# Patient Record
Sex: FEMALE | Race: WHITE | Employment: OTHER | URBAN - METROPOLITAN AREA
[De-identification: names, ages, dates, MRNs, and addresses within clinical notes are randomized per-mention and may not be internally consistent; named-entity substitution may affect disease eponyms.]

---

## 2017-01-10 ENCOUNTER — APPOINTMENT (OUTPATIENT)
Dept: PHYSICAL THERAPY | Age: 72
End: 2017-01-10
Payer: MEDICARE

## 2017-01-11 ENCOUNTER — HOSPITAL ENCOUNTER (OUTPATIENT)
Dept: PHYSICAL THERAPY | Age: 72
Discharge: HOME | End: 2017-01-11
Payer: MEDICARE

## 2017-01-11 PROCEDURE — G8978 MOBILITY CURRENT STATUS: HCPCS

## 2017-01-11 PROCEDURE — 97162 PT EVAL MOD COMPLEX 30 MIN: CPT

## 2017-01-11 PROCEDURE — G8979 MOBILITY GOAL STATUS: HCPCS

## 2017-01-11 PROCEDURE — 97110 THERAPEUTIC EXERCISES: CPT

## 2017-01-11 NOTE — PROGRESS NOTES
[facility] [place] [employer] [facility]  [address] [place] [address]  [phone] fax    Plan of Care/ Statement of Necessity for Physical Therapy Services    Patient name: Darrel Palm Start of Care: 2017   Referral source: Lenan Bacon MD : 1945    Medical Diagnosis: Radiculopathy, thoracolumbar region [M54.15]   Onset Date:16    Treatment Diagnosis: Lumbar pain   Prior Hospitalization: see medical history Provider#: [id number]   Medications: Verified on Patient summary List    Comorbidities: HTN   Prior Level of Function: Ind with ADLs and household tasks, Lives with  in single family house with 3 steps to enter and 3 steps into den, Retired      [employer] of Care and following information is based on the information from the initial evaluation. Assessment/ key information:   Pt is a 66year old female with chief complaints of R lower lumbar and posterior hip pain starting 16 and noticed increase pain after sitting in metal folding chair for couple of hours. Pt's aggravating factors are lying supine, walking >1-2 minutes, standing >1-2 minutes, lying on stomach. Pt's easing factors are reclined in recliner chair, pain medication, muscle relaxers, and nerve pain medication. Pt reports her pain is along posterior R hip, lateral R hip,and and anterior R lower leg, but today only has pain was present along along anterior R leg. Pt reports previously having pain in R posterior hip but has migrated to anterior R leg. Pt currently presents with decreased R LE strength (grossly 3+ to 4-/5 throughout) and pain in R hip during lumbar extension, R rotation, and sidebending, but no pain during R extension quadrant testing. Pt did not present with decreased dural mobility or dermatomal alterations. Pt has minimal decreased in flexibility throughout B hamstrings and gastroc muscles.  Pt had difficulty with body awareness during TA contraction which required extensive verbal cueing to perform. Pt's functional abilities are limited by her pain in R posterior hip and leg. Pt's signs and symptoms are not consistent with radiculopathy but pt has moderate weakness in R LE compared to L with no change in dermatomal patterns. Pt has a fair prognosis of improving symptoms and will benefit from skilled physical therapy services to address deficits to return pt to more functional ability. Evaluation Complexity History MEDIUM  Complexity : 1-2 comorbidities / personal factors will impact the outcome/ POC ; Examination HIGH Complexity : 4+ Standardized tests and measures addressing body structure, function, activity limitation and / or participation in recreation  ;Presentation MEDIUM Complexity : Evolving with changing characteristics  ; Clinical Decision Making MEDIUM Complexity : FOTO score of 26-74  Overall Complexity Rating: MEDIUM  Problem List: pain affecting function, decrease ROM, decrease strength, impaired gait/ balance, decrease ADL/ functional abilitiies, decrease activity tolerance, decrease flexibility/ joint mobility and decrease transfer abilities   Treatment Plan may include any combination of the following: Therapeutic exercise, Therapeutic activities, Neuromuscular re-education, Physical agent/modality, Gait/balance training, Manual therapy, Patient education, Self Care training, Functional mobility training, Home safety training and Stair training  Patient / Family readiness to learn indicated by: asking questions, trying to perform skills and interest  Persons(s) to be included in education: patient (P)  Barriers to Learning/Limitations: None  Patient Goal (s): Reduce pain  Patient Self Reported Health Status: excellent  Rehabilitation Potential: fair    Short Term Goals: To be accomplished in 1 weeks:  1. Pt will be independent with HEP with appropriate technique and compliant of 1-2x per day to facilitate continued level of care.    Long Term Goals: To be accomplished in 6 weeks:  1. Pt will improve FOTO score by 11 points to demonstrate improved functional abilities. 2. Pt will report <3/10 pain during daily activities to improve QOL. 3. Pt will improve standing/walking tolerance to 15 minutes to increase ease with ADLs and household tasks. 4. Pt will improve R LE strength to at least 4/5 to increase ease with ADLs and household tasks. 5. Pt will improve R hip abduction strength to at least 3/5 to increase ease with ambulation and standing activities. Frequency / Duration: Patient to be seen 2-3 times per week for 5 weeks. Patient/ Caregiver education and instruction: Diagnosis, prognosis, self care, activity modification and exercises   [x]  Plan of care has been reviewed with PTA    G-Codes (GP)  Mobility   Current  CK= 40-59%   Goal  CJ= 20-39%    The severity rating is based on clinical judgment and the FOTO score. Certification Period: 1/11/2017 - 4/11/2017 (90 days)  Heike Berry 1/11/2017 6:34 PM    ________________________________________________________________________    I certify that the above Therapy Services are being furnished while the patient is under my care. I agree with the treatment plan and certify that this therapy is necessary.     Physician's Signature:____________________  Date:____________Time: _________    Please sign and return to [facility]  [address] [place] [address]  [phone] fax

## 2017-01-11 NOTE — PROGRESS NOTES
PT DAILY TREATMENT NOTE/LUMBAR EVAL 3-    Patient Name: Roxana Cavanaugh  Date:2017  : 1945  [x]  Patient  Verified  Payor: Moira Stephens / Plan: [place] MED[place] PART A & B / Product Type: Medicare /    In time: 340  Out time:415  Total Treatment Time (min): 35  Total Timed Codes (min): 10  1:1 Treatment Time ( [address] only): 35   Visit #: 1 of 10-15    Treatment Area: Radiculopathy, thoracolumbar region [M54.15]  SUBJECTIVE  Pain Level (0-10 scale): 2/10  []constant [x]intermittent []improving []worsening []no change since onset    Any medication changes, allergies to medications, adverse drug reactions, diagnosis change, or new procedure performed?: [x] No    [] Yes (see summary sheet for update)  Subjective functional status/changes:    Pt presents to therapy today with chief complaints of R posterior hip pain starting 16 without AGATA. Pt's pain goes posterior R hip, down lateral hip and thigh, and anterior lower leg. Pt denies any foot pain. Pt remembers sitting on metal folding chair back in December which is when the pain started. Pt is taking a nerve pill and pain medication which helps during the day but pain increases at night time. Pt's aggravating factors are lying supine (cats at ankles), walking >1-2 minutes, standing >1-2 minutes, lying on stomach  Pt's easing factors are sitting after walking/standing (sitting in recliner with feet up) and pain medication (not during the day, takes muscle relaxors during day). Pt reports prior to December, pt's cat would lay on anterior thigh which would make it numb. Pt would move her R leg around and numbness would subside. Pt lives with  in single floor house with 3 steps to enter and 3 steps into den. Pt denies having any problems with stairs.       PLOF: No AD for ambulation, Minimal exercising, Walking grocery store with cart (or mobile cart)  Limitations to PLOF: L knee pain (started up a while ago)  Mechanism of Injury: Sitting in metal folding chair  Current symptoms/Complaints: Dull, ache, deep down posterior R hip (lower back), lateral hip, anterior lower leg  Previous Treatment/Compliance: No therapy, just taking muscle relaxors, pain medication, and nerve pain medication   PMHx/Surgical Hx: No injury to back previously   Work Hx: Retired  Living Situation: Lives with  in single family house with 3 steps to enter and 3 steps into den  Pt Goals: Reduce pain  Motivation: Fair    OBJECTIVE/EXAMINATION  Domestic Life: Lives with  who has CVA, Single story house with 3 steps to enter and 3 step into den  Activity/Recreational Limitations: Decrease walking and standing ability  Mobility: Weakness in R LE, pain with R hip and lower leg, pain with EROM lumbar extension, R rotation, R side bend  Self Care: Unable to stand for long  25 min [x]Eval                  []Re-Eval       10 min Therapeutic Exercise:  [] See flow sheet :   Rationale: increase ROM and increase strength to improve the patients ability to perform functional activities in the home and community setting           With   [x] TE   [] TA   [] neuro   [] other: Patient Education: [x] Review HEP    [] Progressed/Changed HEP based on:   [] positioning   [] body mechanics   [] transfers   [] heat/ice application    [] other:      Physical Therapy Evaluation - Lumbar Spine ([facility])  Blood pressure: 132/80 mmHg    Posture:  Lateral Shift: [] R    [] L     [] +  [x] -  Lordosis:  [x] Increased [] Decreased   [] WNL    Gait:  [] Normal     [x] Abnormal: Wide ELIOT, Lateral trunk shift during stance phase    Active Movements: [] N/A   [] Too acute   [] Other:  ROM % AROM % PROM Comments:pain, area   Forward flexion 40-60 75     Extension 20-30 75  Pain in R posterior hip   SB right 20-30 75  Minimal pain in R posterior hip   SB left 20-30 75     Rotation right 5-10 75  Pain in R posterior hip   Rotation left 5-10 75       Neuro Screen [] WNL  Myotome/Dermatome/Reflexes: Abnormal  Comments: Myotomes weak and unable to hold contraction for more than 1 second, Dermatomes WNL and denies any sensory changes    Dural Mobility:  SLR Sitting: [] R    [] L    [] +    [] -  @ (degrees):           Supine: [x] R    [x] L    [] +    [x] -  @ (degrees):   Slump Test: [x] R    [x] L    [] +    [x] -  @ (degrees):     Palpation  No palpable tenderness to any structures in R hip, R SIJ region, R anterior lower leg, R anterior thigh, R ITB    Strength   L(0-5) R (0-5) N/T   Hip Flexion (L1,2) 4+ 3+ []   Knee Extension (L3,4) 4+ 4- []   Ankle Dorsiflexion (L4) 4+ 4- []   Great Toe Extension (L5)   []   Ankle Plantarflexion (S1)   []   Knee Flexion (S1,2) 4+ 4 []   Upper Abdominals   []   Lower Abdominals   []   Paraspinals   []   Back Rotators   []   Gluteus Matt   []   Other: Glute med/min 3+ 3- []   TFL compensations noted during glute med testing on both sides     Special Tests  Lumbar:  Lumb. Compression: [] Pos  [] Neg               Lumbar Distraction:   [] Pos  [] Neg    Quadrant:  [] Pos  [x] Neg   [] Flex  [x] Ext and R    Sacroilliac:  Sacral Thrust:   [] +    [x] -          Hip: Shelby Clayton:  [x] R    [] L    [] +    [x] -          Deficits: Hamstrings 90/90: WNL (pulling along posterior leg but did not replicate her pain)    Gastrocsoleus (to neutral): Right: WNL Left: WNL       Global Muscular Weakness:  Abdominals: Weak    Pain Level (0-10 scale) post treatment: 2/10    ASSESSMENT/Changes in Function:   Pt is a 66year old female with chief complaints of R lower lumbar and posterior hip pain starting 12/12/16 and noticed increase pain after sitting in metal folding chair for couple of hours. Pt's aggravating factors are lying supine, walking >1-2 minutes, standing >1-2 minutes, lying on stomach. Pt's easing factors are reclined in recliner chair, pain medication, muscle relaxers, and nerve pain medication.  Pt reports her pain is along posterior R hip, lateral R hip,and and anterior R lower leg, but today only has pain was present along along anterior R leg. Pt reports previously having pain in R posterior hip but has migrated to anterior R leg. Pt currently presents with decreased R LE strength (grossly 3+ to 4-/5 throughout) and pain in R hip during lumbar extension, R rotation, and sidebending, but no pain during R extension quadrant testing. Pt did not present with decreased dural mobility or dermatomal alterations. Pt has minimal decreased in flexibility throughout B hamstrings and gastroc muscles. Pt had difficulty with body awareness during TA contraction which required extensive verbal cueing to perform. Pt's functional abilities are limited by her pain in R posterior hip and leg. Pt's signs and symptoms are not consistent with radiculopathy but pt has moderate weakness in R LE compared to L with no change in dermatomal patterns. Pt has a fair prognosis of improving symptoms and will benefit from skilled physical therapy services to address deficits to return pt to more functional ability. Patient will continue to benefit from skilled PT services to modify and progress therapeutic interventions, address functional mobility deficits, address ROM deficits, address strength deficits, analyze and cue movement patterns, analyze and modify body mechanics/ergonomics, assess and modify postural abnormalities, address imbalance/dizziness and instruct in home and community integration to attain remaining goals. [x]  See Plan of Care  []  See progress note/recertification  []  See Discharge Summary         Progress towards goals / Updated goals:  Short term goals  1. Pt will be independent with HEP with appropriate technique and compliant of 1-2x per day to facilitate continued level of care. Long term goals  1. Pt will improve FOTO score by 11 points to demonstrate improved functional abilities. 2. Pt will report <3/10 pain during daily activities to improve QOL.   3. Pt will improve standing/walking tolerance to 15 minutes to increase ease with ADLs and household tasks. 4. Pt will improve R LE strength to at least 4/5 to increase ease with ADLs and household tasks. 5. Pt will improve R hip abduction strength to at least 3/5 to increase ease with ambulation and standing activities.      PLAN  [x]  Upgrade activities as tolerated     []  Continue plan of care  []  Update interventions per flow sheet       []  Discharge due to:_  []  Other:_      Inocencia Medley 1/11/2017  3:38 PM

## 2017-01-16 ENCOUNTER — HOSPITAL ENCOUNTER (OUTPATIENT)
Dept: PHYSICAL THERAPY | Age: 72
Discharge: HOME | End: 2017-01-16
Payer: MEDICARE

## 2017-01-16 PROCEDURE — 97110 THERAPEUTIC EXERCISES: CPT

## 2017-01-16 NOTE — PROGRESS NOTES
PT DAILY TREATMENT NOTE - [facility]     Patient Name: Bri Tierney  Date:2017  : 1945  [x]  Patient  Verified  Payor: María Rehman / Plan: [place] MEDICARE PART A & B / Product Type: Medicare /    In time:10:30  Out time:11:07  Total Treatment Time (min): 37  Total Timed Codes (min): 37  1:1 Treatment Time ( [address] only): 40   Visit #: 2 of 10-15    Treatment Area: Radiculopathy, thoracolumbar region [M54.15]    SUBJECTIVE  Pain Level (0-10 scale): 2/10  Any medication changes, allergies to medications, adverse drug reactions, diagnosis change, or new procedure performed?: [x] No    [] Yes (see summary sheet for update)  Subjective functional status/changes:   [] No changes reported  Pt reports she is feeling a lot better already with exercises and hasn't had to take her muscle relaxers. Pt states hasn't been having pain down her leg lately. Pt tried exercises on the floor but had increased pain so switched to on her bed. Pt goes back to the MD in a week for a follow up. OBJECTIVE    37 min Therapeutic Exercise:  [x] See flow sheet :   Rationale: increase ROM, increase strength, improve coordination, improve balance and increase proprioception to improve the patients ability to ambulate and sit with centralized symptoms and decreased pain         With   [] TE   [] TA   [] neuro   [] other: Patient Education: [x] Review HEP    [] Progressed/Changed HEP based on:   [] positioning   [] body mechanics   [] transfers   [] heat/ice application    [] other:      Other Objective/Functional Measures:   No radicular symptoms with exercises  Cues for correct TA activation  Cues to exhale with TA contraction to prevent bracing/charline adria  Cues with clam shells to prevent TFL compensations  PD heat due to feeling hot  Slight tightness in l/s after exercises     Pain Level (0-10 scale) post treatment: 2/10    ASSESSMENT/Changes in Function: Initiated exercise program per POC.  Pt with decreased core awareness and strength causing compensation of l/s paraspinals. Pt with decreasing pain and improvement already with working on HEP. Pt still has decreased sitting/standing tolerance. Will continue working on core/hip strength for improved ease of ambulation and standing with decreased LBP and radicular symptoms. Patient will continue to benefit from skilled PT services to modify and progress therapeutic interventions, address functional mobility deficits, address ROM deficits, address strength deficits, analyze and address soft tissue restrictions, analyze and cue movement patterns, analyze and modify body mechanics/ergonomics, assess and modify postural abnormalities, address imbalance/dizziness and instruct in home and community integration to attain remaining goals. Progress towards goals / Updated goals:  Short Term Goals: To be accomplished in 1 weeks:  1. Pt will be independent with HEP with appropriate technique and compliant of 1-2x per day to facilitate continued level of care. Met  Long Term Goals: To be accomplished in 6 weeks:  1. Pt will improve FOTO score by 11 points to demonstrate improved functional abilities. 2. Pt will report <3/10 pain during daily activities to improve QOL. 3. Pt will improve standing/walking tolerance to 15 minutes to increase ease with ADLs and household tasks. 4. Pt will improve R LE strength to at least 4/5 to increase ease with ADLs and household tasks.   5. Pt will improve R hip abduction strength to at least 3/5 to increase ease with ambulation and standing activities.      PLAN  [x]  Upgrade activities as tolerated     [x]  Continue plan of care  []  Update interventions per flow sheet       []  Discharge due to:_  []  Other:_      Rickie Llanos PTA 1/16/2017  8:57 AM    Future Appointments  Date Time Provider Raya Gandara   1/16/2017 10:30 AM Rickie Llanos PTA [facility]   1/18/2017 11:30 AM Rickie Llanos PTA [facility]   1/20/2017 10:30 AM Jennifer Mclaughlin Michlee Magaña, PTA [facility]   1/23/2017 10:30 AM Екатерина Fuss, PTA [facility]   1/25/2017 10:30 AM Екатерина Fuss, PTA [facility]   1/27/2017 10:30 AM Екатерина Fuss, PTA [facility]   1/30/2017 10:00 AM Екатерина Fuss, PTA [facility]   2/1/2017 10:00 AM Екатерина Fuss, PTA [facility]   2/3/2017 10:00 AM Екатерина Fuss, PTA [facility]   2/6/2017 10:00 AM Toney CUETO Giovanis [facility]   2/8/2017 10:00 AM Camille A Giovanis [facility]   2/10/2017 10:00 AM Екатерина Fuss, PTA [facility]   2/13/2017 10:00 AM Camille A Giovanis [facility]   2/15/2017 10:00 AM Camille A Giovanis [facility]   2/17/2017 10:00 AM Camille A Giovanis [facility]   2/20/2017 10:00 AM Camille A Giovanis [facility]   2/22/2017 10:00 AM Camille A Giovanis [facility]   2/24/2017 11:00 AM Camille A Giovanis [facility]

## 2017-01-18 ENCOUNTER — HOSPITAL ENCOUNTER (OUTPATIENT)
Dept: PHYSICAL THERAPY | Age: 72
Discharge: HOME | End: 2017-01-18
Payer: MEDICARE

## 2017-01-18 PROCEDURE — 97110 THERAPEUTIC EXERCISES: CPT

## 2017-01-18 NOTE — PROGRESS NOTES
PT DAILY TREATMENT NOTE - [facility]     Patient Name: Roxana Filter  Date:2017  : 1945  [x]  Patient  Verified  Payor: Moira Stephens / Plan: [place] MED[place] PART A & B / Product Type: Medicare /    In time:11:29  Out time:12:10  Total Treatment Time (min): 41  Total Timed Codes (min): 41  1:1 Treatment Time ( [address] only): 41   Visit #: 3 of 10-15    Treatment Area: Radiculopathy, thoracolumbar region [M54.15]    SUBJECTIVE  Pain Level (0-10 scale): 0/10  Any medication changes, allergies to medications, adverse drug reactions, diagnosis change, or new procedure performed?: [x] No    [] Yes (see summary sheet for update)  Subjective functional status/changes:   [] No changes reported  Pt reports no current pain in her back or down her R leg. Pt states she is a bit restless before going to bed and she didn't fall asleep until 4 A.M. This morning. Pt is compliant with exercises but still struggling with core holds. OBJECTIVE    41 min Therapeutic Exercise:  [x] See flow sheet :   Rationale: increase ROM, increase strength, improve coordination, improve balance and increase proprioception to improve the patients ability to maintain neutral hips/spine with ambulation and ADLs          With   [] TE   [] TA   [] neuro   [] other: Patient Education: [x] Review HEP    [] Progressed/Changed HEP based on:   [] positioning   [] body mechanics   [] transfers   [] heat/ice application    [] other:      Other Objective/Functional Measures:   Challenged with exhalation with exertion during exercises  Max cues for TA hold; pt tends to brace instead of draw in motion  Slight ache in low back at end of session but no increased pain  No radicular symptoms during exercises     Pain Level (0-10 scale) post treatment: 0/10 ache    ASSESSMENT/Changes in Function: Pt making steady progress towards goals with decreased pain in R LE. Pt still has decreased R LE strength compared to L LE likely due to nerve irritation.  Pt has poor TA awareness for contraction and requires cueing for breathing with exercises. Will continue working on core/hip strength to restore normal strength and improve ease of ambulation and ADLs without increased LBP or radicular symptoms. Patient will continue to benefit from skilled PT services to modify and progress therapeutic interventions, address functional mobility deficits, address ROM deficits, address strength deficits, analyze and address soft tissue restrictions, analyze and cue movement patterns, analyze and modify body mechanics/ergonomics, assess and modify postural abnormalities, address imbalance/dizziness and instruct in home and community integration to attain remaining goals. Progress towards goals / Updated goals:  Short Term Goals: To be accomplished in 1 weeks:  1. Pt will be independent with HEP with appropriate technique and compliant of 1-2x per day to facilitate continued level of care. Met  Long Term Goals: To be accomplished in 6 weeks:  1. Pt will improve FOTO score by 11 points to demonstrate improved functional abilities. 2. Pt will report <3/10 pain during daily activities to improve QOL. 3. Pt will improve standing/walking tolerance to 15 minutes to increase ease with ADLs and household tasks. 4. Pt will improve R LE strength to at least 4/5 to increase ease with ADLs and household tasks. 5. Pt will improve R hip abduction strength to at least 3/5 to increase ease with ambulation and standing activities.      PLAN  [x]  Upgrade activities as tolerated     [x]  Continue plan of care  []  Update interventions per flow sheet       []  Discharge due to:_  []  Other:_      Antwan Vicente PTA 1/18/2017  11:35 AM    Future Appointments  Date Time Provider Raya Gandara   1/20/2017 10:30 AM Antwan Vicente PTA [facility]CP[facility]   1/23/2017 10:30 AM Antwan Vicente PTA [facility]   1/25/2017 10:30 AM Antwan Vicente PTA [facility]   1/27/2017 10:30 AM Jeni Rodriguez Georgina Matthews, PTA [facility]   1/30/2017 10:00 AM Ramon Primmer, PTA [facility]   2/1/2017 10:00 AM Ramon Primmer, PTA [facility]   2/3/2017 10:00 AM Ramon Primmer, PTA [facility]   2/6/2017 10:00 AM Camille A Giovanis [facility]   2/8/2017 10:00 AM Camille A Giovanis [facility]   2/10/2017 10:00 AM Ramon Gates, PTA [id number] [facility]   2/13/2017 10:00 AM Camille A Giovanis [facility]   2/15/2017 10:00 AM Camille A Giovanis [facility]   2/17/2017 10:00 AM Camille A Giovanis [facility]   2/20/2017 10:00 AM Camille A Giovanis [facility]   2/22/2017 10:00 AM Camille A Giovanis [facility]   2/24/2017 11:00 AM Camille A Giovanis [facility]

## 2017-01-20 ENCOUNTER — HOSPITAL ENCOUNTER (OUTPATIENT)
Dept: PHYSICAL THERAPY | Age: 72
Discharge: HOME | End: 2017-01-20
Payer: MEDICARE

## 2017-01-20 PROCEDURE — 97110 THERAPEUTIC EXERCISES: CPT

## 2017-01-20 NOTE — PROGRESS NOTES
PT DAILY TREATMENT NOTE - [facility]     Patient Name: Jennifer Ramirez  Date:2017  : 1945  [x]  Patient  Verified  Payor: [place] MEDICARE / Plan: [place] MEDICARE PART A & B / Product Type: Medicare /    In time: 10:31  Out time:11:09  Total Treatment Time (min): 38  Total Timed Codes (min): 38  1:1 Treatment Time ( only): 45  Visit #: 4 of 10-15    Treatment Area: Radiculopathy, thoracolumbar region [M54.15]    SUBJECTIVE  Pain Level (0-10 scale): 0/10  Any medication changes, allergies to medications, adverse drug reactions, diagnosis change, or new procedure performed?: [x] No    [] Yes (see summary sheet for update)  Subjective functional status/changes:   [] No changes reported  Pt reports therapy is really helping her pain. Pt states her knee is even feeling better with the exercises. Pt reports only a faint pain in the R hip that only lasted 10-15 minutes with 1/10 pain since starting therapy. Pt was able to walk around [employer] and the [employer] without any pain in her back or knee the other day and is very pleased.      OBJECTIVE    38 min Therapeutic Exercise:  [x] See flow sheet :   Rationale: increase ROM and increase strength to improve the patients ability to Improve ease of standing and walking without back pain          With   [] TE   [] TA   [] neuro   [] other: Patient Education: [x] Review HEP    [] Progressed/Changed HEP based on:   [] positioning   [] body mechanics   [] transfers   [] heat/ice application    [] other:      Other Objective/Functional Measures:   Improving TA activation  Still needs cues for breathing with exercises to prevent valsalva  Struggled with SA TB rotation; cues to perform slowly  Progressed reps/resistance per flow sheet without difficulty  No increased pain with exercises  Cues to prevent posterior trunk roll for clamshells  Assistance with R hip abduction in S/L; able to perform L hip abduction independently  Slight ache/stiffness by end of session but no pain    Pain Level (0-10 scale) post treatment: 0/10    ASSESSMENT/Changes in Function: Pt making excellent progress towards goals. Pt has improving TA engagement with exercises but still needs reminders for breathing technique during session to prevent valsalva. Pt has improved standing/walking tolerance as she was able to walk around [employer] and the [employer] store without pain. Pt has had at most 1/10 which was a faint R hip pain that only lasted 10-15 minutes since starting therapy and the exercises. Pt has also noticed decrease in L knee pain since strengthening. Will continue working on core/hip strength for centralized symptoms and improved standing/walking tolerance for ease of community ambulation with  and performing ADLs. Patient will continue to benefit from skilled PT services to modify and progress therapeutic interventions, address functional mobility deficits, address ROM deficits, address strength deficits, assess and modify postural abnormalities and instruct in home and community integration to attain remaining goals. Progress towards goals / Updated goals:  Short Term Goals: To be accomplished in 1 weeks:  1. Pt will be independent with HEP with appropriate technique and compliant of 1-2x per day to facilitate continued level of care. Met  Long Term Goals: To be accomplished in 6 weeks:  1. Pt will improve FOTO score by 11 points to demonstrate improved functional abilities. 2. Pt will report <3/10 pain during daily activities to improve QOL. Met only 1/10 the other day faint pain that only lasted 10-15 minutes (1/20/17)  3. Pt will improve standing/walking tolerance to 15 minutes to increase ease with ADLs and household tasks. Met; able to walk around [employer] and [employer] store without pain (1/20/17)  4. Pt will improve R LE strength to at least 4/5 to increase ease with ADLs and household tasks.   5. Pt will improve R hip abduction strength to at least 3/5 to increase ease with ambulation and standing activities.  Continues 3-/5 (1/20/17)    PLAN  [x]  Upgrade activities as tolerated     [x]  Continue plan of care  []  Update interventions per flow sheet       []  Discharge due to:_  []  Other:_      Wenonah Izabel, PTA 1/20/2017  9:57 AM    Future Appointments  Date Time Provider Raya Gandara   1/20/2017 10:30 AM Wenonah Izabel, PTA [facility]   1/23/2017 10:30 AM Wenonah Izabel, PTA [facility]   1/25/2017 10:30 AM Wenonah Izabel, PTA [id number] [facility]   1/27/2017 10:30 AM Wenonah Izabel, PTA [id number] [facility]   1/30/2017 10:00 AM Wenonah Izabel, PTA VHRMMZO [facility]   2/1/2017 10:00 AM Wenonah Izabel, PTA [id number] [facility]   2/3/2017 10:00 AM Wenonah Izabel, PTA [facility]   2/6/2017 10:00 AM Sonny Hooker A Giovanis [facility]   2/8/2017 10:00 AM Camille A Giovanis [facility]   2/10/2017 10:00 AM Wenonah Izabel, PTA FHEMSQF [facility]   2/13/2017 10:00 AM Camille A Giovanis [facility]   2/15/2017 10:00 AM Camille A Giovanis [facility]   2/17/2017 10:00 AM Camille A Giovanis [facility]   2/20/2017 10:00 AM Camille A Giovanis [facility]   2/22/2017 10:00 AM Camille Humphries [facility]   2/24/2017 11:00 AM Camille Humphries [facility]

## 2017-01-23 ENCOUNTER — HOSPITAL ENCOUNTER (OUTPATIENT)
Dept: PHYSICAL THERAPY | Age: 72
Discharge: HOME | End: 2017-01-23
Payer: MEDICARE

## 2017-01-23 PROCEDURE — 97110 THERAPEUTIC EXERCISES: CPT

## 2017-01-23 NOTE — PROGRESS NOTES
PT DAILY TREATMENT NOTE - [facility]     Patient Name: Laura Dee  Date:2017  : 1945  [x]  Patient  Verified  Payor: Linder Cheadle / Plan: [place] MEDICARE PART A & B / Product Type: Medicare /    In time:1030  Out time:1101  Total Treatment Time (min): 31  Total Timed Codes (min): 31  1:1 Treatment Time ( [address] only): 31   Visit #: 5 of 10-15    Treatment Area: Radiculopathy, thoracolumbar region [M54.15]    SUBJECTIVE  Pain Level (0-10 scale): 1/10  Any medication changes, allergies to medications, adverse drug reactions, diagnosis change, or new procedure performed?: [x] No    [] Yes (see summary sheet for update)  Subjective functional status/changes:   [] No changes reported  Pt reports being stiff and having kink in neck. Pt reports sleeping in bad positions due to cats sleeping with her. Pt reports the numbness in R thigh that came on when standing has gone away. Stiffness still resides. Pt had difficulty swinging R leg into car this weekend.      OBJECTIVE  31 min Therapeutic Exercise:  [x] See flow sheet :   Rationale: increase ROM, increase strength, improve coordination, improve balance and increase proprioception to improve the patients ability to perform functional activities with improved symptoms           With   [x] TE   [] TA   [] neuro   [] other: Patient Education: [x] Review HEP    [] Progressed/Changed HEP based on:   [] positioning   [] body mechanics   [] transfers   [] heat/ice application    [] other:      Other Objective/Functional Measures:   Good form with TA holds - easy and will perform more difficult core strengthening next session   No increase in pain during TA hold with SLR, Minimal notice of arching lumbar spine (psoas over riding)  Added RTB to knees during bridges without increase in pain, Increased use of B hips reported by pt  Progressed to L2 of clams with B feet in air - good form and no fall of feet during set  Added 2 pound weights to ankles during standing exercises  Pain and buckling in L knee during standing bars exercises but was able to push through, Pt reported having previous issues with L knee separate to current condition     Pain Level (0-10 scale) post treatment: 0/10    ASSESSMENT/Changes in Function: Pt tolerated treatment session very well today with no increase in pain and good form with increased difficulty with exercises. Pt has improved functional abilities where able to walk the grocery store without pain, just stiffness in lumbar spine, and no radicular symptoms into R thigh. Pt is progressing very well with therapy and is close to meeting her goals but still struggles with strength in R LE. Continue POC and strengthening R LE. Patient will continue to benefit from skilled PT services to modify and progress therapeutic interventions, address functional mobility deficits, address ROM deficits, address strength deficits, analyze and address soft tissue restrictions, analyze and cue movement patterns, analyze and modify body mechanics/ergonomics, assess and modify postural abnormalities, address imbalance/dizziness and instruct in home and community integration to attain remaining goals. [x]  See Plan of Care  []  See progress note/recertification  []  See Discharge Summary         Progress towards goals / Updated goals:  Short Term Goals: To be accomplished in 1 weeks:  1. Pt will be independent with HEP with appropriate technique and compliant of 1-2x per day to facilitate continued level of care. Met  Long Term Goals: To be accomplished in 6 weeks:  1. Pt will improve FOTO score by 11 points to demonstrate improved functional abilities. 2. Pt will report <3/10 pain during daily activities to improve QOL. Met only 2/10 over weekend due to weather and stiffness (1/23/17)  3. Pt will improve standing/walking tolerance to 15 minutes to increase ease with ADLs and household tasks.  Met; able to walk around [employer] and [employer] without pain (1/20/17)  4. Pt will improve R LE strength to at least 4/5 to increase ease with ADLs and household tasks. 5. Pt will improve R hip abduction strength to at least 3/5 to increase ease with ambulation and standing activities.  Continues 3-/5 (1/20/17)    PLAN  []  Upgrade activities as tolerated     [x]  Continue plan of care  []  Update interventions per flow sheet       []  Discharge due to:_  []  Other:_      Jose Luis Moctezuma 1/23/2017  9:40 AM    Future Appointments  Date Time Provider Raya Gandara   1/23/2017 10:30 AM Camille ROM Nevilleis [facility]   1/25/2017 10:30 AM Velma Villarreal, PTA [facility]   1/27/2017 10:30 AM Velma Villarreal, PTA [facility]   1/30/2017 10:00 AM Velma Villarreal, PTA [facility]   2/1/2017 10:00 AM Velma Villarreal, PTA [facility]   2/3/2017 10:00 AM Velma Villarreal, PTA [facility]   2/6/2017 10:00 AM PEARL CASTILLO [facility] A Giovanis [facility]   2/8/2017 10:00 AM Camille ROM Giovanis [facility]   2/10/2017 10:00 AM Velma Villarreal, PTA [facility]CP[facility]   2/13/2017 10:00 AM Camille A Giovanis [facility]   2/15/2017 10:00 AM Camille A Giovanis [facility]   2/17/2017 10:00 AM Camille A Giovanis [facility]   2/20/2017 10:00 AM Camille Humphries [facility]   2/22/2017 10:00 AM Camille Humphries [facility]   2/24/2017 11:00 AM Camille Humphries [facility]

## 2017-01-25 ENCOUNTER — HOSPITAL ENCOUNTER (OUTPATIENT)
Dept: PHYSICAL THERAPY | Age: 72
Discharge: HOME | End: 2017-01-25
Payer: MEDICARE

## 2017-01-25 PROCEDURE — 97110 THERAPEUTIC EXERCISES: CPT

## 2017-01-25 NOTE — PROGRESS NOTES
PT DAILY TREATMENT NOTE - [facility]     Patient Name: Noel Fagan  Date:2017  : 1945  [x]  Patient  Verified  Payor: Luis Many / Plan: [place] MEDICARE PART A & B / Product Type: Medicare /    In time:10:30  Out time:11:07  Total Treatment Time (min): 37  Total Timed Codes (min): 37  1:1 Treatment Time ( [address] only): 40   Visit #: 6 of 10-15    Treatment Area: Radiculopathy, thoracolumbar region [M54.15]    SUBJECTIVE  Pain Level (0-10 scale): 0/10  Any medication changes, allergies to medications, adverse drug reactions, diagnosis change, or new procedure performed?: [x] No    [] Yes (see summary sheet for update)  Subjective functional status/changes:   [] No changes reported  Pt reports sciatic nerve on R side acted up 1-2/10 pain with sitting 1 hour at the doctor's office with her friend yesterday and then again later at home in her recliner. Pt reports pain was gone by the time she went to bed and today she is good.      OBJECTIVE    37 min Therapeutic Exercise:  [x] See flow sheet :   Rationale: increase ROM and increase strength to improve the patients ability to tolerate prolonged standing/walking for ADLs and household chores          With   [] TE   [] TA   [] neuro   [] other: Patient Education: [x] Review HEP    [] Progressed/Changed HEP based on:   [] positioning   [] body mechanics   [] transfers   [] heat/ice application    [] other:      Other Objective/Functional Measures:   Cues to perform SA TB rotation slowly with control  Progressed weight for LAQ  Cues for TA engagement with all exercises  Assistance required for R S/L abduction to prevent TFL compensation  Cues with donkey kicks to keep knee bent for glute max activation  Good stretch felt in R piriformis  Slight limp during ambulation; no pain causing but may need to check for LLD or upslip from gait alterations with previous pain     Pain Level (0-10 scale) post treatment: 0/10    ASSESSMENT/Changes in Function: Pt making steady progress towards goals in therapy. Pt has fewer episodes of sciatic nerve irritation in R LE but did have it recently with prolonged sitting >1 hour at a doctor's office and later in her lazyboy with 1-2/10 pain. Overall pt has improving hip/core strength and tolerating higher level strengthening exercises. Will continue working on core strength for back stability for decreased pain or sciatic symptoms with prolonged sitting and standing. Patient will continue to benefit from skilled PT services to modify and progress therapeutic interventions, address functional mobility deficits, address ROM deficits and address strength deficits to attain remaining goals. Progress towards goals / Updated goals:  Short Term Goals: To be accomplished in 1 weeks:  1. Pt will be independent with HEP with appropriate technique and compliant of 1-2x per day to facilitate continued level of care. Met  Long Term Goals: To be accomplished in 6 weeks:  1. Pt will improve FOTO score by 11 points to demonstrate improved functional abilities. Met 13 point improvement per FOTO on 1/23/17  2. Pt will report <3/10 pain during daily activities to improve QOL. Met only 2/10 over weekend due to weather and stiffness (1/23/17)  3. Pt will improve standing/walking tolerance to 15 minutes to increase ease with ADLs and household tasks. Met; able to walk around [employer] without pain (1/20/17)  4. Pt will improve R LE strength to at least 4/5 to increase ease with ADLs and household tasks. 5. Pt will improve R hip abduction strength to at least 3/5 to increase ease with ambulation and standing activities. Continues 3-/5 (1/20/17)    PLAN  [x]  Upgrade activities as tolerated     [x]  Continue plan of care  []  Update interventions per flow sheet       []  Discharge due to:_  [x]  Other: Check for LLD to assist with gait mechanics.       Екатерина Shepherd, PTA 1/25/2017  9:52 AM    Future Appointments  Date Time Provider Raya Gandara   1/25/2017 10:30 AM Tabatha Robertson, PTA [facility]   1/27/2017 10:30 AM Tabatha Robertson, PTA [facility]   1/30/2017 10:00 AM Tabatha Robertson, PTA [facility]   2/1/2017 10:00 AM Tabatha Robertson, PTA [facility]   2/3/2017 10:00 AM Tabatha Robertson, PTA [facility]   2/6/2017 10:00 AM Kierra Aleman A Giovanis [facility]   2/8/2017 10:00 AM Camille A Giovanis [facility]   2/10/2017 10:00 AM Tabatha Robertson, PTA IVDVCRF [facility]   2/13/2017 10:00 AM Camille A Giovanis [facility]   2/15/2017 10:00 AM Camille A Giovanis [facility]   2/17/2017 10:00 AM Camille A Giovanis [facility]   2/20/2017 10:00 AM Camille A Giovanis [facility]   2/22/2017 10:00 AM Camille A Giovanis [facility]CP[facility]   2/24/2017 11:00 AM Camille A Giovanis [facility]

## 2017-01-27 ENCOUNTER — HOSPITAL ENCOUNTER (OUTPATIENT)
Dept: PHYSICAL THERAPY | Age: 72
Discharge: HOME | End: 2017-01-27
Payer: MEDICARE

## 2017-01-27 PROCEDURE — 97110 THERAPEUTIC EXERCISES: CPT

## 2017-01-27 NOTE — PROGRESS NOTES
PT DAILY TREATMENT NOTE - [facility]     Patient Name: Laura Dee  Date:2017  : 1945  [x]  Patient  Verified  Payor: Linder Cheadle / Plan: [place] MED[place] & B / Product Type: Medicare /    In time:10:30  Out time:11:10  Total Treatment Time (min): 40  Total Timed Codes (min): 40  1:1 Treatment Time ( [address] only): 40  Visit #: 7 of 10-15    Treatment Area: Radiculopathy, thoracolumbar region [M54.15]    SUBJECTIVE  Pain Level (0-10 scale): 0/10  Any medication changes, allergies to medications, adverse drug reactions, diagnosis change, or new procedure performed?: [x] No    [] Yes (see summary sheet for update)  Subjective functional status/changes:   [] No changes reported  Pt reports no pain or stiffness or pain currently in her low back. Pt had one episode of scaitic nerve pain but can't remember cause of pain; only maybe 1-2/10 pain.  Pt states difficulty getting out of her car; reports her R leg gets stuck under the steering wheel and trying to clear the door jam.     OBJECTIVE    40 min Therapeutic Exercise:  [x] See flow sheet :   Rationale: increase ROM, increase strength, improve coordination, improve balance and increase proprioception to improve the patients ability to maintain centralized symptoms for ease of prolonged sitting and ambulation          With   [] TE   [] TA   [] neuro   [] other: Patient Education: [x] Review HEP    [] Progressed/Changed HEP based on:   [] positioning   [] body mechanics   [] transfers   [] heat/ice application    [] other:      Other Objective/Functional Measures:   Checked car transfer for pt; educated to turn to side in seat and back up a little and pt had improved clearance of R LE to get out of car; tried with door all the way open and slightly open to accommodate when pt mena next to car and pt could perform  Worked on unilateral stretching and strengthening to improve pelvic alignment; pt has R AI; uses R for stepping up stairs step to step pattern and has developed asymmetries  No increased pain during session  Challenged with LAQ machine on L and with SLR on L  Cues for core engagement during session     Pain Level (0-10 scale) post treatment: 0/10    ASSESSMENT/Changes in Function: Pt making excellent progress towards initial goals in therapy. Pt only getting slight sciatic symptoms occasionally but does have pelvic obliquity which could be contributing. Pt has a tight R piriformis to compensate for decreased glute strength from overcompensation of R hip flexor/quad with ADLs and stair negotiation. Will continue working to restore muscle balance and improve core/hip strength to maintain alignment and decrease sciatic symptoms for ease of prolonged sitting and walking. Patient will continue to benefit from skilled PT services to modify and progress therapeutic interventions, address functional mobility deficits, address ROM deficits, address strength deficits, analyze and address soft tissue restrictions, analyze and cue movement patterns, analyze and modify body mechanics/ergonomics, assess and modify postural abnormalities, address imbalance/dizziness and instruct in home and community integration to attain remaining goals. Progress towards goals / Updated goals:  Short Term Goals: To be accomplished in 1 weeks:  1. Pt will be independent with HEP with appropriate technique and compliant of 1-2x per day to facilitate continued level of care. Met  Long Term Goals: To be accomplished in 6 weeks:  1. Pt will improve FOTO score by 11 points to demonstrate improved functional abilities. Met 13 point improvement per FOTO on 1/23/17  2. Pt will report <3/10 pain during daily activities to improve QOL. Met only 2/10 over weekend due to weather and stiffness (1/23/17)  3. Pt will improve standing/walking tolerance to 15 minutes to increase ease with ADLs and household tasks.  Met; able to walk around [employer] without pain (1/20/17)  4. Pt will improve R LE strength to at least 4/5 to increase ease with ADLs and household tasks. 5. Pt will improve R hip abduction strength to at least 3/5 to increase ease with ambulation and standing activities.  Continues 3-/5 (1/20/17)    PLAN  [x]  Upgrade activities as tolerated     [x]  Continue plan of care  []  Update interventions per flow sheet       []  Discharge due to:_  []  Other:_      Damon Phan PTA 1/27/2017  10:24 AM    Future Appointments  Date Time Provider Raya Gandara   1/27/2017 10:30 AM Damon Phan PTA [facility]   1/30/2017 10:00 AM Damon Phan, PTA [facility]   2/1/2017 10:00 AM Damon Phan, PTA [facility]   2/3/2017 10:00 AM Damon Phan, PTA [facility]   2/6/2017 10:00 AM Valmarianna Ruggieroos A Giovanis [facility]   2/8/2017 10:00 AM Camille A Giovanis [facility]   2/10/2017 10:00 AM Damon Phan, PTA [facility]ON[facility]   2/13/2017 10:00 AM Camille A Giovanis [facility]   2/15/2017 10:00 AM Camille A Giovanis [facility]   2/17/2017 10:00 AM Camille A Giovanis [facility]   2/20/2017 10:00 AM Camille A Giovanis [facility]   2/22/2017 10:00 AM Camille A Giovanis [facility]   2/24/2017 11:00 AM Camille Humphries [facility]

## 2017-01-30 ENCOUNTER — HOSPITAL ENCOUNTER (OUTPATIENT)
Dept: PHYSICAL THERAPY | Age: 72
Discharge: HOME | End: 2017-01-30
Payer: MEDICARE

## 2017-01-30 PROCEDURE — 97110 THERAPEUTIC EXERCISES: CPT

## 2017-01-30 NOTE — PROGRESS NOTES
PT DAILY TREATMENT NOTE - [facility]     Patient Name: Tiana Fink  Date:2017  : 1945  [x]  Patient  Verified  Payor: [place] MEDICARE / Plan: [place] MEDICARE PART A & B / Product Type: Medicare /    In time:10:00  Out time:10:42  Total Treatment Time (min): 42  Total Timed Codes (min): 42  1:1 Treatment Time ( only): 42   Visit #: 8 of 10-15    Treatment Area: Radiculopathy, thoracolumbar region [M54.15]    SUBJECTIVE  Pain Level (0-10 scale): 0/10  Any medication changes, allergies to medications, adverse drug reactions, diagnosis change, or new procedure performed?: [x] No    [] Yes (see summary sheet for update)  Subjective functional status/changes:   [] No changes reported  Pt reports only a little twinge in her R hip mainly when she is laying in bed. Pt notes pain has only been a 1/10. Pt reports getting in and out of the car is easier. Pt has a hard time getting on her sock/shoe on the R foot but has been like that for a long time. OBJECTIVE    42 min Therapeutic Exercise:  [x] See flow sheet :   Rationale: increase ROM and increase strength to improve the patients ability to improve ease of prolonged sitting and ambulation         With   [] TE   [] TA   [] neuro   [] other: Patient Education: [x] Review HEP    [] Progressed/Changed HEP based on:   [] positioning   [] body mechanics   [] transfers   [] heat/ice application    [] other:      Other Objective/Functional Measures:   R hip flexion: 4-/5   R knee extension: 4+/5  R knee flexion: 4+/5  R ankle dorsiflexion: 4/5  Progressed reps per flow sheet  Added TR/HR to address ankle weakness  Updated HEP  Slight L knee pain with SLS during hipx3 on the R     Pain Level (0-10 scale) post treatment:0/10      ASSESSMENT/Changes in Function: Pt making good progress with decreased pain in back and less frequent radicular symptoms. Pt still getting small twinge of pain in R buttock with sleeping that at most is a 1/10 pain.  Pt still demonstrating decreased strength of R LE compared to L LE and will benefit from further strengthening. Will also continue working on core strength for decreased pain with prolonged standing, walking, and sitting. Patient will continue to benefit from skilled PT services to modify and progress therapeutic interventions, address functional mobility deficits, address ROM deficits, address strength deficits, analyze and address soft tissue restrictions, analyze and cue movement patterns, analyze and modify body mechanics/ergonomics, assess and modify postural abnormalities, address imbalance/dizziness and instruct in home and community integration to attain remaining goals. Progress towards goals / Updated goals:  Short Term Goals: To be accomplished in 1 weeks:  1. Pt will be independent with HEP with appropriate technique and compliant of 1-2x per day to facilitate continued level of care. Met  Long Term Goals: To be accomplished in 6 weeks:  1. Pt will improve FOTO score by 11 points to demonstrate improved functional abilities. Met 13 point improvement per FOTO on 1/23/17  2. Pt will report <3/10 pain during daily activities to improve QOL. Met only 2/10 over weekend due to weather and stiffness (1/23/17)  3. Pt will improve standing/walking tolerance to 15 minutes to increase ease with ADLs and household tasks. Met; able to walk around [employer] without pain (1/20/17)  4. Pt will improve R LE strength to at least 4/5 to increase ease with ADLs and household tasks. Progressing R hip flexion: 4-/5   R knee extension: 4+/5  R knee flexion: 4+/5  R ankle dorsiflexion: 4/5 (1/30/17)  5. Pt will improve R hip abduction strength to at least 3/5 to increase ease with ambulation and standing activities.  Continues 3-/5 (1/20/17)    PLAN  [x]  Upgrade activities as tolerated     [x]  Continue plan of care  []  Update interventions per flow sheet       []  Discharge due to:_  []  Other:_      Mecca Trivedi Jaci Powell, PTA 1/30/2017  9:44 AM    Future Appointments  Date Time Provider Raya Gandara   1/30/2017 10:00 AM Sisto Sham, PTA [facility]   2/1/2017 10:00 AM Sisto Sham, PTA [facility]   2/3/2017 10:00 AM Sisto Sham, PTA [facility]   2/6/2017 10:00 AM Sisto Sham, PTA [facility]   2/8/2017 10:00 AM Alcus Willy A Giovanis [facility]   2/10/2017 10:00 AM Sisto Sham, PTA [facility]   2/13/2017 10:00 AM Alcus Willy A Giovanis [facility]   2/15/2017 10:00 AM Camille A Giovanis [facility]   2/17/2017 10:00 AM Camille A Giovanis [facility]   2/20/2017 10:00 AM Camille A Giovanis [facility]   2/22/2017 10:00 AM Camille A Giovanis [facility]   2/24/2017 11:00 AM Camille A Giovanis [facility]

## 2017-02-01 ENCOUNTER — HOSPITAL ENCOUNTER (OUTPATIENT)
Dept: PHYSICAL THERAPY | Age: 72
Discharge: HOME | End: 2017-02-01
Payer: MEDICARE

## 2017-02-01 PROCEDURE — 97110 THERAPEUTIC EXERCISES: CPT

## 2017-02-01 NOTE — PROGRESS NOTES
PT DAILY TREATMENT NOTE - [facility]     Patient Name: Lisa Kaplan  Date:2017  : 1945  [x]  Patient  Verified  Payor: VA MEDICARE / Plan: [place] MEDICARE PART A & B / Product Type: Medicare /    In time:10:02  Out time:10:34  Total Treatment Time (min): 32  Total Timed Codes (min): 32  1:1 Treatment Time ([address] only): 32   Visit #: 9 of 10-15    Treatment Area: Radiculopathy, thoracolumbar region [M54.15]    SUBJECTIVE  Pain Level (0-10 scale): 0/10  Any medication changes, allergies to medications, adverse drug reactions, diagnosis change, or new procedure performed?: [x] No    [] Yes (see summary sheet for update)  Subjective functional status/changes:   [] No changes reported  Pt reports about 2 hours after therapy increased nag down the side of her R leg to her knee that took the remainder of the day to go away. Pt states she had to take an aleve and then woke up the next morning and it was gone. Pt has had no other episodes of pain in R leg since.      OBJECTIVE    32 min Therapeutic Exercise:  [x] See flow sheet :   Rationale: increase ROM and increase strength to improve the patients ability to tolerate sitting without increased LBP or R radicular symptoms         With   [] TE   [] TA   [] neuro   [] other: Patient Education: [x] Review HEP    [] Progressed/Changed HEP based on:   [] positioning   [] body mechanics   [] transfers   [] heat/ice application    [] other:      Other Objective/Functional Measures:   Cues for TA engagement during session  Progressed reps per flow sheet  Focused on TA exercises today  Added PPT for core strength  Progressed to GTB for bridges; cues for PPT first  Added dead bugs; difficulty with coordination of movement tactile cues for assist  No radicular symptoms with exercises    Pain Level (0-10 scale) post treatment: 0/10    ASSESSMENT/Changes in Function: Pt with flare up of radicular symptoms after previous session for about 10-12 hours requiring aleve for relief but only to R knee. Pt likely fatigued with TA engagement during standing exercises causing increased l/s compression. Will continue with focus on core/hip strength for decreased radicular symptoms with standing, walking, and sitting. Patient will continue to benefit from skilled PT services to modify and progress therapeutic interventions, address functional mobility deficits, address ROM deficits, address strength deficits, analyze and address soft tissue restrictions, analyze and cue movement patterns, analyze and modify body mechanics/ergonomics, assess and modify postural abnormalities, address imbalance/dizziness and instruct in home and community integration to attain remaining goals. Progress towards goals / Updated goals:  Short Term Goals: To be accomplished in 1 weeks:  1. Pt will be independent with HEP with appropriate technique and compliant of 1-2x per day to facilitate continued level of care. Met  Long Term Goals: To be accomplished in 6 weeks:  1. Pt will improve FOTO score by 11 points to demonstrate improved functional abilities. Met 13 point improvement per FOTO on 1/23/17  2. Pt will report <3/10 pain during daily activities to improve QOL. Met only 2/10 over weekend due to weather and stiffness (1/23/17)  3. Pt will improve standing/walking tolerance to 15 minutes to increase ease with ADLs and household tasks. Met; able to walk around [employer] and [employer] without pain (1/20/17)  4. Pt will improve R LE strength to at least 4/5 to increase ease with ADLs and household tasks. Progressing R hip flexion: 4-/5   R knee extension: 4+/5  R knee flexion: 4+/5  R ankle dorsiflexion: 4/5 (1/30/17)  5. Pt will improve R hip abduction strength to at least 3/5 to increase ease with ambulation and standing activities.  Continues 3-/5 (1/20/17)    PLAN  [x]  Upgrade activities as tolerated     [x]  Continue plan of care  []  Update interventions per flow sheet       []  Discharge due to:_  []  Other:_      Arnol Walls, PTA 2/1/2017  9:44 AM    Future Appointments  Date Time Provider Raya Gandara   2/1/2017 10:00 AM Arnol Walls, PTA [facility]   2/3/2017 10:00 AM Arnol Walls, PTA [facility]   2/6/2017 10:00 AM Arnol Walls, PTA [facility]   2/8/2017 10:00 AM Daniel CUETO Giovanis [facility]   2/10/2017 10:00 AM Arnol Walls PTA [facility]   2/13/2017 10:00 AM Daniel CUETO Giovanis [facility]   2/15/2017 10:00 AM Camille A Giovanis [facility]   2/17/2017 10:00 AM Camille A Giovanis [facility]   2/20/2017 10:00 AM Camille A Giovanis [facility]   2/22/2017 10:00 AM Camille A Giovanis [facility]   2/24/2017 11:00 AM Camille A Giovanis [facility]

## 2017-02-03 ENCOUNTER — HOSPITAL ENCOUNTER (OUTPATIENT)
Dept: PHYSICAL THERAPY | Age: 72
Discharge: HOME | End: 2017-02-03
Payer: MEDICARE

## 2017-02-03 PROCEDURE — G8978 MOBILITY CURRENT STATUS: HCPCS

## 2017-02-03 PROCEDURE — G8979 MOBILITY GOAL STATUS: HCPCS

## 2017-02-03 NOTE — PROGRESS NOTES
[facility] [place] [employer] [facility]  [address]  [phone] fax    Medicare Progress Report    Patient name: Cristofer Mclaughlin Start of Care: 17   Referral source: Martha Klein MD : 1945   Medical/Treatment Diagnosis: Radiculopathy, thoracolumbar region [M54.15] Onset Date:16     Prior Hospitalization: see medical history Provider#: [id number]   Medications: Verified on Patient Summary List    Comorbidities: HTN  Prior Level of Function: Ind with ADLs and household tasks, Lives with  in single family house with 3 steps to enter and 3 steps into den, Retired  Visits from [facility]: 10    Missed Visits: 0    Reporting Period: 17 to 2/3/17    Subjective Reports: Pt reports 70% improvement. Pt with occasional numbness now in R groin to knee but not toothache pain she used to get with sitting and walking. Radicular symptoms used to be every day but are now only 1-2 times a week. Pt will be okay to D/C in 5 visits with updated HEP for core strengthening (3 times week for 1 week; 2 times week for 1 week). Pt notes legs are getting stronger and she is feeling better all the way around. Key functional changes:   Short Term Goals: To be accomplished in 1 weeks:  1. Pt will be independent with HEP with appropriate technique and compliant of 1-2x per day to facilitate continued level of care. Met  Long Term Goals: To be accomplished in 6 weeks:  1. Pt will improve FOTO score by 11 points to demonstrate improved functional abilities. Met 13 point improvement per FOTO on 17  2. Pt will report <3/10 pain during daily activities to improve QOL. Met only 2/10 over weekend due to weather and stiffness (17)  3. Pt will improve standing/walking tolerance to 15 minutes to increase ease with ADLs and household tasks. Met; able to walk around [employer] without pain (17)  4.  Pt will improve R LE strength to at least 4/5 to increase ease with ADLs and household tasks. Progressing R hip flexion: 4-/5; R knee extension: 4+/5; R knee flexion: 4+/5  R ankle dorsiflexion: 4/5 (1/30/17)  5. Pt will improve R hip abduction strength to at least 3/5 to increase ease with ambulation and standing activities. MET Hip abduction MMT:L 4/5 R 4-/5 (2/3/17)    Problems/ barriers to goal attainment: N/A     Assessment / Recommendations:  Pt is progressing very well with therapy at this time. Pt's pain levels have reduced and at most will elevate to 2/10 pain. Pt's walking and standing tolerance has improved to being able to do functional activities in the community setting. Pt's strength continues to improve in R LE each visit and pt has become more aware of core musculature activation during exercises. Pt will continued to benefit from skilled physical therapy services to address deficits and improve consistent core awareness for community activities. Problem List: pain affecting function, decrease ROM, decrease strength, decrease activity tolerance and decrease flexibility/ joint mobility   Treatment Plan: Therapeutic exercise, Therapeutic activities, Neuromuscular re-education, Physical agent/modality, Gait/balance training, Manual therapy, Patient education, Self Care training, Functional mobility training and Home safety training    Patient Goal (s) has been updated and includes: Reduce pain    Updated Goals to be accomplished in 2 weeks:  1. Pt will improve R LE strength to at least 4/5 to increase ease with ADLs and household tasks. 2. Pt will be independent with updated HEP with appropriate technique and compliance 1-2x per day to facilitate continued level of care following discharge. 3. Pt will be able to perform correct core activation during standing activities by subjective reports and observation to increase stability of spine throughout the day.      Frequency / Duration: Patient to be seen 2-3 times per week for 2 weeks:    G-Codes (GP)  Mobility  [id number] Current  CJ= 20-39%  [id number] Goal  CJ= 20-39%    The severity rating is based on clinical judgment and the FOTO score.       Radha Humphries 2/3/2017 2:58 PM

## 2017-02-03 NOTE — PROGRESS NOTES
PT DAILY TREATMENT NOTE - [facility]     Patient Name: Hi Pat  Date:2/3/2017  : 1945  [x]  Patient  Verified  Payor: Elisha Evangelista / Plan: [place] MEDICARE PART A & B / Product Type: Medicare /    In time:9:51  Out time:10:28  Total Treatment Time (min): 37  Total Timed Codes (min): 37  1:1 Treatment Time ( [address] only): 30  Visit #: 10 of 10-15    Treatment Area: Radiculopathy, thoracolumbar region [M54.15]    SUBJECTIVE  Pain Level (0-10 scale): 0/10  Any medication changes, allergies to medications, adverse drug reactions, diagnosis change, or new procedure performed?: [x] No    [] Yes (see summary sheet for update)  Subjective functional status/changes:   [] No changes reported  Pt reports 70% improvement. Pt with occasional numbness now in R groin to knee but not toothache pain she used to get with sitting and walking. Radicular symptoms used to be every day but are now only 1-2 times a week. Pt will be okay to D/C in 5 visits with updated HEP for core strengthening (3 times week for 1 week; 2 times week for 1 week). Pt notes legs are getting stronger and she is feeling better all the way around. OBJECTIVE    37 min Therapeutic Exercise:  [x] See flow sheet :   Rationale: increase ROM, increase strength, improve coordination, improve balance and increase proprioception to improve the patients ability to maintain centralized symptoms with prolonged sitting and standing for ease of community ambulation and ADLs         With   [] TE   [] TA   [] neuro   [] other: Patient Education: [x] Review HEP    [] Progressed/Changed HEP based on:   [] positioning   [] body mechanics   [] transfers   [] heat/ice application    [] other:      Other Objective/Functional Measures:    FOTO: 63  Hip abduction MMT:  L 4/5 R 4-/5  Progressed to evie disc for core exercises    Pain Level (0-10 scale) post treatment: 0/10    ASSESSMENT/Changes in Function: Pt making excellent progress towards goals with 70% improvement since starting therapy. Pt only getting radicular symptoms 1-2 times a week versus every day in R LE groin to knee and is no longer toothache pain just light numbness. Pt has improvement in R LE strength and good compliance with HEP. Will continue for 5 more visits 3 times one week and 2 times one week to develop final core strengthening program to ensure independent management of symptoms prior to D/C. Patient will continue to benefit from skilled PT services to modify and progress therapeutic interventions, address functional mobility deficits, address ROM deficits, address strength deficits, analyze and address soft tissue restrictions, analyze and cue movement patterns, analyze and modify body mechanics/ergonomics, assess and modify postural abnormalities, address imbalance/dizziness and instruct in home and community integration to attain remaining goals. [x]  See progress note/recertification         Progress towards goals / Updated goals:  Short Term Goals: To be accomplished in 1 weeks:  1. Pt will be independent with HEP with appropriate technique and compliant of 1-2x per day to facilitate continued level of care. Met  Long Term Goals: To be accomplished in 6 weeks:  1. Pt will improve FOTO score by 11 points to demonstrate improved functional abilities. Met 13 point improvement per FOTO on 1/23/17  2. Pt will report <3/10 pain during daily activities to improve QOL. Met only 2/10 over weekend due to weather and stiffness (1/23/17)  3. Pt will improve standing/walking tolerance to 15 minutes to increase ease with ADLs and household tasks. Met; able to walk around [employer] and [employer] without pain (1/20/17)  4. Pt will improve R LE strength to at least 4/5 to increase ease with ADLs and household tasks. Progressing R hip flexion: 4-/5; R knee extension: 4+/5; R knee flexion: 4+/5  R ankle dorsiflexion: 4/5 (1/30/17)  5.  Pt will improve R hip abduction strength to at least 3/5 to increase ease with ambulation and standing activities.  MET Hip abduction MMT:L 4/5 R 4-/5 (2/3/17)    PLAN  [x]  Upgrade activities as tolerated     [x]  Continue plan of care  []  Update interventions per flow sheet       []  Discharge due to:_  []  Other:_      Arnol Walls PTA 2/3/2017  9:01 AM    Future Appointments  Date Time Provider Raya Gandara   2/3/2017 10:00 AM Arnol Walls PTA [facility]   2/6/2017 10:00 AM Arnol Walls PTA [facility]   2/8/2017 10:00 AM Daniel CUETO Giovanis [facility]   2/10/2017 10:00 AM Arnlo Walls PTA [facility]   2/13/2017 10:00 AM Daniel CUETO Giovanis [facility]   2/15/2017 10:00 AM Camille A Giovanis [facility]   2/17/2017 10:00 AM Camille A Giovanis [facility]   2/20/2017 10:00 AM Camille A Giovanis [facility]   2/22/2017 10:00 AM Camille A Giovanis [facility]   2/24/2017 11:00 AM Camille A Giovanis [facility]

## 2017-02-06 ENCOUNTER — HOSPITAL ENCOUNTER (OUTPATIENT)
Dept: PHYSICAL THERAPY | Age: 72
Discharge: HOME | End: 2017-02-06
Payer: MEDICARE

## 2017-02-06 PROCEDURE — 97110 THERAPEUTIC EXERCISES: CPT

## 2017-02-06 NOTE — PROGRESS NOTES
PT DAILY TREATMENT NOTE - [facility]     Patient Name: Isaac Cortes  Date:2017  : 1945  [x]  Patient  Verified  Payor: [place] MEDICARE / Plan: [place] MEDICARE PART A & B / Product Type: Medicare /    In time:1000  Out time:1039  Total Treatment Time (min): 39  Total Timed Codes (min): 39  1:1 Treatment Time ([facility] only): 44   Visit #: 11 of 10-15    Treatment Area: Radiculopathy, thoracolumbar region [M54.15]    SUBJECTIVE  Pain Level (0-10 scale): 0/10  Any medication changes, allergies to medications, adverse drug reactions, diagnosis change, or new procedure performed?: [x] No    [] Yes (see summary sheet for update)  Subjective functional status/changes:   [] No changes reported  Pt reports     OBJECTIVE  39 min Therapeutic Exercise:  [x] See flow sheet :   Rationale: increase ROM, increase strength and improve coordination to improve the patients ability to perform functional activities with improved core awareness          With   [x] TE   [] TA   [] neuro   [] other: Patient Education: [x] Review HEP    [] Progressed/Changed HEP based on:   [] positioning   [] body mechanics   [] transfers   [] heat/ice application    [] other:      Other Objective/Functional Measures:   TA hold while sitting on evie disc - feet touching (narrow ELIOT), frequent use of UE on table to keep from LOB  TA hold on 1/2 foam roll - wide ELIOT with feet, no use of UE on table  Marches on 1/2 foam roll - had to use elbows on table to prevent LOB, too difficult for pt at this time  Able to progress to SL hip abduction with assistance initially but able to continue  Pt required extensive verbal cueing throughout exercises to reduce speed and pace and control core activation   Able to perform sit to stand with fair core awareness and better form for squat than in parallel bars (ankle and knee flexion before hip movement)  Required chair cueing to get squat for TB row with squat  Had to go onto toe during TB ext with march and required extensive cueing    Pain Level (0-10 scale) post treatment: 0/10    ASSESSMENT/Changes in Function: Pt tolerated treatment session fairly well today without increase in pain levels in lumbar spine. Pt does continue to require cueing to reduce speed and pace during all exercises. Pt has improved her core awareness and was able to progress core strengthening while standing this session. Pt will continue to benefit from advanced core strengthening in seated and standing positions. Patient will continue to benefit from skilled PT services to modify and progress therapeutic interventions, address functional mobility deficits, address strength deficits, analyze and cue movement patterns, analyze and modify body mechanics/ergonomics, assess and modify postural abnormalities and instruct in home and community integration to attain remaining goals. []  See Plan of Care  [x]  See progress note/recertification  []  See Discharge Summary         Progress towards goals / Updated goals:  1. Pt will improve R LE strength to at least 4/5 to increase ease with ADLs and household tasks. 2. Pt will be independent with updated HEP with appropriate technique and compliance 1-2x per day to facilitate continued level of care following discharge. 3. Pt will be able to perform correct core activation during standing activities by subjective reports and observation to increase stability of spine throughout the day.      PLAN  []  Upgrade activities as tolerated     [x]  Continue plan of care  []  Update interventions per flow sheet       []  Discharge due to:_  []  Other:_      Inocencia Medley 2/6/2017  9:31 AM    Future Appointments  Date Time Provider Raya Gandara   2/6/2017 10:00 AM Inocencia Medley [facility]   2/8/2017 10:00 AM Camille Humphries [facility]   2/10/2017 10:00 AM Micheal Najera PTA [facility]   2/13/2017 10:00 AM Camille Humphries [facility]   2/15/2017 10:00 AM Camille Humphries [facility]

## 2017-02-08 ENCOUNTER — HOSPITAL ENCOUNTER (OUTPATIENT)
Dept: PHYSICAL THERAPY | Age: 72
Discharge: HOME | End: 2017-02-08
Payer: MEDICARE

## 2017-02-08 PROCEDURE — 97110 THERAPEUTIC EXERCISES: CPT

## 2017-02-08 PROCEDURE — 97112 NEUROMUSCULAR REEDUCATION: CPT

## 2017-02-08 NOTE — PROGRESS NOTES
PT DAILY TREATMENT NOTE - [facility]    Patient Name: Amy Swartz  Date:2017  : 1945  [x]  Patient  Verified  Payor: Alejandra Damico / Plan: [place] MEDICARE PART A & B / Product Type: Medicare /    In time:10:00  Out time:10:42  Total Treatment Time (min): 42  Total Timed Codes (min): 42  1:1 Treatment Time ( [address] only): 42   Visit #: 12 of 10-15    Treatment Area: Radiculopathy, thoracolumbar region [M54.15]    SUBJECTIVE  Pain Level (0-10 scale): 2  Any medication changes, allergies to medications, adverse drug reactions, diagnosis change, or new procedure performed?: [x] No    [] Yes (see summary sheet for update)  Subjective functional status/changes:   [] No changes reported  \"I'm having some pain down the side of my R thigh. I didn't get much sleep last night\"  OBJECTIVE  27 min Therapeutic Exercise:  [x] See flow sheet :   Rationale: increase ROM, increase strength and improve coordination to improve the patients ability to perform functional tasks    15 min Neuromuscular Re-education:  [x]  See flow sheet :   Rationale: improve coordination, improve balance and increase proprioception  to improve the patients ability to perform functional activities with improved core awareness               With   [] TE   [] TA   [] neuro   [] other: Patient Education: [x] Review HEP    [] Progressed/Changed HEP based on:   [] positioning   [] body mechanics   [] transfers   [] heat/ice application    [] other:      Other Objective/Functional Measures:     Increased TA glute x 3 to 15 reps bilaterally  Added marches standing on 1/2 foam x15 with cues for upright posture   Increased sit<->stand reps x15  Increased TB TA rot with GTB x 15    Pain Level (0-10 scale) post treatment: 0    ASSESSMENT/Changes in Function: Patient is making progress towards goals as demonstrated by meeting goal to be independent with HEP with appropriate technique and reports \"I do the exercises once a day\".  Will continue to utilize tactile cues to maintain TA activation. Patient will continue to benefit from skilled PT services to modify and progress therapeutic interventions, address functional mobility deficits, address ROM deficits, address strength deficits, analyze and address soft tissue restrictions, analyze and cue movement patterns, analyze and modify body mechanics/ergonomics and assess and modify postural abnormalities to attain remaining goals. []  See Plan of Care  []  See progress note/recertification  []  See Discharge Summary         Progress towards goals / Updated goals:  1. Pt will improve R LE strength to at least 4/5 to increase ease with ADLs and household tasks. 2. Pt will be independent with updated HEP with appropriate technique and compliance 1-2x per day to facilitate continued level of care following discharge. -MET 2/7/2017   3.  Pt will be able to perform correct core activation during standing activities by subjective reports and observation to increase stability of spine throughout the day.        PLAN  []  Upgrade activities as tolerated     [x]  Continue plan of care  []  Update interventions per flow sheet       []  Discharge due to:_  []  Other:_      Reynold Valente 2/8/2017  10:01 AM    Future Appointments  Date Time Provider Raya Gandara   2/10/2017 10:00 AM Brandon Rangel PTA [facility]   2/13/2017 10:00 AM Camille Hupmhries [facility]   2/15/2017 10:00 AM Camille Humphries [facility]

## 2017-02-10 ENCOUNTER — HOSPITAL ENCOUNTER (OUTPATIENT)
Dept: PHYSICAL THERAPY | Age: 72
Discharge: HOME | End: 2017-02-10
Payer: MEDICARE

## 2017-02-10 PROCEDURE — 97110 THERAPEUTIC EXERCISES: CPT

## 2017-02-10 NOTE — PROGRESS NOTES
PT DAILY TREATMENT NOTE - [facility]     Patient Name: Laura Dee  Date:2/10/2017  : 1945  [x]  Patient  Verified  Payor: Linder Cheadle / Plan: [place] MEDICARE PART A & B / Product Type: Medicare /    In time:10:00  Out time:10:33  Total Treatment Time (min): 33  Total Timed Codes (min): 33  1:1 Treatment Time ( [address] only): 33  Visit #: 12 of 10-15    Treatment Area: Radiculopathy, thoracolumbar region [M54.15]    SUBJECTIVE  Pain Level (0-10 scale): 0/10  Any medication changes, allergies to medications, adverse drug reactions, diagnosis change, or new procedure performed?: [x] No    [] Yes (see summary sheet for update)  Subjective functional status/changes:   [] No changes reported  Pt reports an episode of twinging in her R hip down the leg the other day and then a toothache pain with sitting in her recliner. Pt notes it only lasted an hour and then went away. Pt okay with doing other exercises on her own and will be ready for D/C in 2 visits.      OBJECTIVE    33 min Therapeutic Exercise:  [x] See flow sheet :   Rationale: increase ROM and increase strength to improve the patients ability to maintain core activation with ADLs and centralized symptoms          With   [] TE   [] TA   [] neuro   [] other: Patient Education: [x] Review HEP    [] Progressed/Changed HEP based on:   [] positioning   [] body mechanics   [] transfers   [] heat/ice application    [] other:      Other Objective/Functional Measures:   Progressed to BTB for TA perpendicular press on TB  Cues for soft knees during standing TB exercises; one episode of knees catching and almost tripping  Cues for upright on evie disc for posture and feet close together for Narrow ELIOT  Cues for hips on side slightly anterior for S/L abduction  Educated about not locking knees out and detrimental effects on arthritic knees  Cues with wall PPT to not crunch just roll the hips     Pain Level (0-10 scale) post treatment: 0/10    ASSESSMENT/Changes in Function: Pt improving with core/hip strength evidenced by progression of reps/resistance with exercises. Pt getting occasional radicular symptoms in R LE but lasting only short periods and less often. Pt will benefit from 2 final sessions to develop a final HEP with core focus for continued improvement independently at home upon D/C. Patient will continue to benefit from skilled PT services to modify and progress therapeutic interventions, address functional mobility deficits, address ROM deficits, address strength deficits, analyze and address soft tissue restrictions, analyze and cue movement patterns, analyze and modify body mechanics/ergonomics, assess and modify postural abnormalities and instruct in home and community integration to attain remaining goals. Progress towards goals / Updated goals:  1. Pt will improve R LE strength to at least 4/5 to increase ease with ADLs and household tasks. 2. Pt will be independent with updated HEP with appropriate technique and compliance 1-2x per day to facilitate continued level of care following discharge. Not yet initiated (2/10/17)  3.  Pt will be able to perform correct core activation during standing activities by subjective reports and observation to increase stability of spine throughout the day.      PLAN  [x]  Upgrade activities as tolerated     [x]  Continue plan of care  []  Update interventions per flow sheet       []  Discharge due to:_  []  Other:_      Rickie Llanos PTA 2/10/2017  9:03 AM    Future Appointments  Date Time Provider Raya Gandara   2/10/2017 10:00 AM Rickie Llanos PTA [facility]   2/13/2017 10:00 AM Camille Humphries [facility]   2/15/2017 10:00 AM Camille Humphries [facility]

## 2017-02-13 ENCOUNTER — HOSPITAL ENCOUNTER (OUTPATIENT)
Dept: PHYSICAL THERAPY | Age: 72
Discharge: HOME | End: 2017-02-13
Payer: MEDICARE

## 2017-02-13 PROCEDURE — 97110 THERAPEUTIC EXERCISES: CPT

## 2017-02-13 NOTE — PROGRESS NOTES
PT DAILY TREATMENT NOTE - [facility]     Patient Name: Jayda Harrison  Date:2017  : 1945  [x]  Patient  Verified  Payor: Farheen Mayers / Plan: [place] MEDICARE PART A & B / Product Type: Medicare /    In time:1003  Out time:1034  Total Treatment Time (min): 31  Total Timed Codes (min): 31  1:1 Treatment Time ([address] only): 31   Visit #: 14 of 10-15    Treatment Area: Radiculopathy, thoracolumbar region [M54.15]    SUBJECTIVE  Pain Level (0-10 scale): 0/10  Any medication changes, allergies to medications, adverse drug reactions, diagnosis change, or new procedure performed?: [x] No    [] Yes (see summary sheet for update)  Subjective functional status/changes:   [] No changes reported  Pt reports yesterday in [employer] not being able to find comfortable position to sit due to R leg pain. Once standing, pt did not have as much pain. Pt had to take muscle relaxor medication to help her pain. Pt's most pain was 2/10 over weekend. OBJECTIVE  31 min Therapeutic Exercise:  [x] See flow sheet :   Rationale: increase strength, improve coordination and increase proprioception to improve the patients ability to perform functional activities in the home and community setting           With   [x] TE   [] TA   [] neuro   [] other: Patient Education: [x] Review HEP    [] Progressed/Changed HEP based on:   [] positioning   [] body mechanics   [] transfers   [] heat/ice application    [] other:      Other Objective/Functional Measures:   Received updated HEP and bands to perform at home  Good core activation during all exercises  Able to progress to SB core strengthening     Pain Level (0-10 scale) post treatment: 0/10    ASSESSMENT/Changes in Function: Pt tolerated treatment session very well today with meeting of 2/3 goals. Pt's strenght and core awareness has greatly improved. Pt did have slight bout of radicular symptoms into R LE over weekend which was able to be resolved with HEP and muscle relaxors.  Pt will be ready for discharge from physical therapy next visit with updated HEP. Patient will continue to benefit from skilled PT services to modify and progress therapeutic interventions, address functional mobility deficits, address strength deficits, analyze and cue movement patterns, analyze and modify body mechanics/ergonomics, assess and modify postural abnormalities and instruct in home and community integration to attain remaining goals. []  See Plan of Care  [x]  See progress note/recertification  []  See Discharge Summary         Progress towards goals / Updated goals:  1. Pt will improve R LE strength to at least 4/5 to increase ease with ADLs and household tasks. Met 2/13/17 4/5 hip flexion, 5/5 knee flexion/extension   2. Pt will be independent with updated HEP with appropriate technique and compliance 1-2x per day to facilitate continued level of care following discharge. Progressing 2/13/17 Initiated today  3.  Pt will be able to perform correct core activation during standing activities by subjective reports and observation to increase stability of spine throughout the day Met 2/13/17 Able to perform all standing exercises with good core activation and no verbal cueing necessary     PLAN  []  Upgrade activities as tolerated     [x]  Continue plan of care  []  Update interventions per flow sheet       []  Discharge due to:_  []  Other:_      May Pals 2/13/2017  9:30 AM    Future Appointments  Date Time Provider Raya Gandara   2/13/2017 10:00 AM Kranthi Humphries [facility]   2/15/2017 10:00 AM John Denson PTA [facility]

## 2017-02-15 ENCOUNTER — HOSPITAL ENCOUNTER (OUTPATIENT)
Dept: PHYSICAL THERAPY | Age: 72
Discharge: HOME | End: 2017-02-15
Payer: MEDICARE

## 2017-02-15 PROCEDURE — 97110 THERAPEUTIC EXERCISES: CPT

## 2017-02-15 PROCEDURE — G8980 MOBILITY D/C STATUS: HCPCS

## 2017-02-15 PROCEDURE — G8979 MOBILITY GOAL STATUS: HCPCS

## 2017-02-15 NOTE — PROGRESS NOTES
PT DISCHARGE DAILY NOTE AND [id number]    Date:2/15/2017  Patient name: Roxana Cavanaugh Start of Care: 17   Referral source: Monet Landeros MD : [id number]   Medical/Treatment Diagnosis: Radiculopathy, thoracolumbar region [M54.15] Onset Date:16     Prior Hospitalization: see medical history Provider#: [id number]   Medications: Verified on Patient Summary List    Comorbidities: HTN  Prior Level of Function: Ind with ADLs and household tasks, Lives with  in single family house with 3 steps to enter and 3 steps into den, Retired    Visits from [facility]: 15    Missed Visits: 0    Reporting Period : 17 to 2/15/17    [x]  Patient  Verified  [place]or: [place] MED[place]ARE / Plan: [place] MEDICARE PART A & B / Product Type: Medicare /    In time:954  Out time:1025  Total Treatment Time (min): 31  Total Timed Codes (min): 31  1:1 Treatment Time ([facility] only): 31   Visit #: 15     SUBJECTIVE  Pain Level (0-10 scale): 0/10  Any medication changes, allergies to medications, adverse drug reactions, diagnosis change, or new procedure performed?: [x] No    [] Yes (see summary sheet for update)  Subjective functional status/changes:   [] No changes reported  Pt reports feeling very well. Pt has been performing her exercises at home and used them last weekend when her pain elevated, which subsided her pain.      OBJECTIVE  31 min Therapeutic Exercise:  [x] See flow sheet :   Rationale: increase ROM, increase strength and improve coordination to improve the patients ability to perform functional activities in the home and community setting           With   [x] TE   [] TA   [] neuro   [] other: Patient Education: [x] Review HEP    [] Progressed/Changed HEP based on:   [] positioning   [] body mechanics   [] transfers   [] heat/ice application    [] other:      Other Objective/Functional Measures:   SB TA holds - narrow ELIOT was very difficult and LOB to side directions, Widened stance and able to hold position  SB marches with wide ELIOT and support of calves along anterior aspect of SB  SB perturbations - LOB always to lateral sides   Sit to stand with foam under feet and evie disc - no difficulty under table was lowered and required more momentum to stand     Pain Level (0-10 scale) post treatment: 0/10    Summary of Care:  Goal: Pt will improve R LE strength to at least 4/5 to increase ease with ADLs and household tasks. Status at last note/certification: 2-3/ hip flexion, 4+/5 knee flexion/extension  Status at discharge: met (4/5 hip flexion, 5/5 knee flexion/extension)    Goal:  Pt will be independent with updated HEP with appropriate technique and compliance 1-2x per day to facilitate continued level of care following discharge. Status at last note/certification: N/A  Status at discharge: met (Reports compliance)    Goal: Pt will be able to perform correct core activation during standing activities by subjective reports and observation to increase stability of spine throughout the day  Status at last note/certification: Intermittent core activation which can increase her pain during transitional activities   Status at discharge: met (Able to perform all standing exercises with good core activation and no verbal cueing necessary)    ASSESSMENT/Changes in Function:   Pt is ready for discharged from physical therapy at this time and has met 3 of 3 goals. Pt has progressed very well with therapy and has increased core activation and awareness during all functional activities. Pt has reported 0/10 pain for several sessions without increase during all exercises performed, including advanced core strengthening. Pt received updated HEP and is compliant with program. Therefore, pt is ready for discharge with updated HEP to continued to improve core strength and stability for all activities in the home and community setting.      G-Codes (GP)  Mobility   [id number] Goal  CJ= 20-39%  D/C  CJ= 20-39%    The severity rating is based on clinical judgment and the FOTO score.       Thank you for this referral!     PLAN  [x]Discontinue therapy: [x]Patient has reached or is progressing toward set goals      []Patient is non-compliant or has abdicated      []Due to lack of appreciable progress towards set goals    Lakhwinder Humphries 2/15/2017  8:36 AM

## 2017-02-17 ENCOUNTER — APPOINTMENT (OUTPATIENT)
Dept: PHYSICAL THERAPY | Age: 72
End: 2017-02-17
Payer: MEDICARE

## 2017-02-20 ENCOUNTER — APPOINTMENT (OUTPATIENT)
Dept: PHYSICAL THERAPY | Age: 72
End: 2017-02-20
Payer: MEDICARE

## 2017-02-22 ENCOUNTER — APPOINTMENT (OUTPATIENT)
Dept: PHYSICAL THERAPY | Age: 72
End: 2017-02-22
Payer: MEDICARE

## 2017-02-24 ENCOUNTER — APPOINTMENT (OUTPATIENT)
Dept: PHYSICAL THERAPY | Age: 72
End: 2017-02-24
Payer: MEDICARE

## 2017-08-06 RX ORDER — HYDROCHLOROTHIAZIDE 25 MG/1
TABLET ORAL
Qty: 90 TAB | Refills: 2 | Status: SHIPPED | OUTPATIENT
Start: 2017-08-06 | End: 2018-04-06 | Stop reason: SDUPTHER

## 2017-08-06 RX ORDER — PROPRANOLOL HYDROCHLORIDE 20 MG/1
TABLET ORAL
Qty: 180 TAB | Refills: 1 | Status: SHIPPED | OUTPATIENT
Start: 2017-08-06 | End: 2018-02-23 | Stop reason: SDUPTHER

## 2017-09-01 DIAGNOSIS — I10 BENIGN ESSENTIAL HYPERTENSION: Primary | ICD-10-CM

## 2017-09-01 DIAGNOSIS — E78.00 PURE HYPERCHOLESTEROLEMIA: ICD-10-CM

## 2017-09-01 DIAGNOSIS — I10 BENIGN ESSENTIAL HYPERTENSION: ICD-10-CM

## 2017-09-05 ENCOUNTER — HOSPITAL ENCOUNTER (OUTPATIENT)
Dept: LAB | Age: 72
Discharge: HOME | End: 2017-09-05
Payer: MEDICARE

## 2017-09-05 LAB
ALBUMIN SERPL-MCNC: 3.8 G/DL (ref 3.4–5)
ALBUMIN/GLOB SERPL: 1.1 {RATIO} (ref 0.8–1.7)
ALP SERPL-CCNC: 75 U/L (ref 45–117)
ALT SERPL-CCNC: 25 U/L (ref 13–56)
ANION GAP SERPL CALC-SCNC: 10 MMOL/L (ref 3–18)
AST SERPL-CCNC: 15 U/L (ref 15–37)
BASOPHILS # BLD: 0 K/UL (ref 0–0.1)
BASOPHILS NFR BLD: 0 % (ref 0–2)
BILIRUB SERPL-MCNC: 0.4 MG/DL (ref 0.2–1)
BUN SERPL-MCNC: 29 MG/DL (ref 7–18)
BUN/CREAT SERPL: 35 (ref 12–20)
CALCIUM SERPL-MCNC: 9 MG/DL (ref 8.5–10.1)
CHLORIDE SERPL-SCNC: 104 MMOL/L (ref 100–108)
CHOLEST SERPL-MCNC: 203 MG/DL
CO2 SERPL-SCNC: 25 MMOL/L (ref 21–32)
CREAT SERPL-MCNC: 0.83 MG/DL (ref 0.6–1.3)
DIFFERENTIAL METHOD BLD: ABNORMAL
EOSINOPHIL # BLD: 0.4 K/UL (ref 0–0.4)
EOSINOPHIL NFR BLD: 5 % (ref 0–5)
ERYTHROCYTE [DISTWIDTH] IN BLOOD BY AUTOMATED COUNT: 14.2 % (ref 11.6–14.5)
GLOBULIN SER CALC-MCNC: 3.5 G/DL (ref 2–4)
GLUCOSE SERPL-MCNC: 108 MG/DL (ref 74–99)
HCT VFR BLD AUTO: 46.8 % (ref 35–45)
HDLC SERPL-MCNC: 51 MG/DL (ref 40–60)
HDLC SERPL: 4 {RATIO} (ref 0–5)
HGB BLD-MCNC: 15.6 G/DL (ref 12–16)
LDLC SERPL CALC-MCNC: 132.2 MG/DL (ref 0–100)
LIPID PROFILE,FLP: ABNORMAL
LYMPHOCYTES # BLD: 2.2 K/UL (ref 0.9–3.6)
LYMPHOCYTES NFR BLD: 26 % (ref 21–52)
MCH RBC QN AUTO: 30.2 PG (ref 24–34)
MCHC RBC AUTO-ENTMCNC: 33.3 G/DL (ref 31–37)
MCV RBC AUTO: 90.5 FL (ref 74–97)
MONOCYTES # BLD: 0.6 K/UL (ref 0.05–1.2)
MONOCYTES NFR BLD: 7 % (ref 3–10)
NEUTS SEG # BLD: 5.2 K/UL (ref 1.8–8)
NEUTS SEG NFR BLD: 62 % (ref 40–73)
PLATELET # BLD AUTO: 286 K/UL (ref 135–420)
PMV BLD AUTO: 11.2 FL (ref 9.2–11.8)
POTASSIUM SERPL-SCNC: 4.1 MMOL/L (ref 3.5–5.5)
PROT SERPL-MCNC: 7.3 G/DL (ref 6.4–8.2)
RBC # BLD AUTO: 5.17 M/UL (ref 4.2–5.3)
SODIUM SERPL-SCNC: 139 MMOL/L (ref 136–145)
TRIGL SERPL-MCNC: 99 MG/DL (ref ?–150)
VLDLC SERPL CALC-MCNC: 19.8 MG/DL
WBC # BLD AUTO: 8.5 K/UL (ref 4.6–13.2)

## 2017-09-05 PROCEDURE — 80061 LIPID PANEL: CPT | Performed by: INTERNAL MEDICINE

## 2017-09-05 PROCEDURE — 80053 COMPREHEN METABOLIC PANEL: CPT | Performed by: INTERNAL MEDICINE

## 2017-09-05 PROCEDURE — 36415 COLL VENOUS BLD VENIPUNCTURE: CPT | Performed by: INTERNAL MEDICINE

## 2017-09-05 PROCEDURE — 85025 COMPLETE CBC W/AUTO DIFF WBC: CPT | Performed by: INTERNAL MEDICINE

## 2017-09-14 ENCOUNTER — OFFICE VISIT (OUTPATIENT)
Dept: INTERNAL MEDICINE CLINIC | Age: 72
End: 2017-09-14

## 2017-09-14 VITALS
HEART RATE: 56 BPM | WEIGHT: 240 LBS | RESPIRATION RATE: 16 BRPM | TEMPERATURE: 97.8 F | HEIGHT: 67 IN | SYSTOLIC BLOOD PRESSURE: 124 MMHG | BODY MASS INDEX: 37.67 KG/M2 | OXYGEN SATURATION: 98 % | DIASTOLIC BLOOD PRESSURE: 70 MMHG

## 2017-09-14 DIAGNOSIS — Z23 ENCOUNTER FOR IMMUNIZATION: ICD-10-CM

## 2017-09-14 DIAGNOSIS — Z00.00 INITIAL MEDICARE ANNUAL WELLNESS VISIT: Primary | ICD-10-CM

## 2017-09-14 DIAGNOSIS — I10 BENIGN ESSENTIAL HYPERTENSION: ICD-10-CM

## 2017-09-14 DIAGNOSIS — Z71.89 ADVANCE DIRECTIVE DISCUSSED WITH PATIENT: ICD-10-CM

## 2017-09-14 RX ORDER — NAPROXEN SODIUM 220 MG
440 TABLET ORAL
COMMUNITY
End: 2019-12-04 | Stop reason: ALTCHOICE

## 2017-09-14 RX ORDER — GLUCOSAM/CHONDRO/HERB 149/HYAL 750-100 MG
2 TABLET ORAL DAILY
COMMUNITY
End: 2019-03-07

## 2017-09-14 RX ORDER — CETIRIZINE HCL 10 MG
10 TABLET ORAL DAILY
COMMUNITY
End: 2021-11-18 | Stop reason: ALTCHOICE

## 2017-09-14 NOTE — PATIENT INSTRUCTIONS
Medicare Wellness Visit, Female    The best way to improve and maintain good health is to have a healthy lifestyle by eating a well-balanced diet, exercising regularly, limiting alcohol and stopping smoking. Regular physical exams and screening tests are another way to keep healthy. Preventive exams provided by your health care provider can find health problems before they become diseases or illnesses. Preventive services including immunizations, screening tests, monitoring and exams can help you take care of your own health. Preventive services such as immunizations prevent serious infections. All people over age 72 should have a Pneumovax and a Prevnar-13 shot to prevent potentially life threatening infections with the pneumococcus bacteria, a common cause of pneumonia. These are once in a lifetime unless you and your provider decide differently. Next due: Completed    All people over 65 should have a yearly influenza vaccine or \"flu\" shot. This does not prevent infection with cold viruses but has been proven to prevent hospitalization and death from influenza. Next due: this season - can be given today    Although Medicare part B \"regular Medicare\" currently only covers tetanus vaccination in the context of an injury, a tetanus vaccine (Tdap or Td) is recommended every 10 years. Tdap is generally given once in a lifetime for older adults. Next due: Td in 2023    A shingles vaccine is recommended once in a lifetime after age 61. The Shingles vaccine is also not covered by Medicare part B. Next due: Zostavax    Note, however, that both the Shingles vaccine and Tdap/Td are generally covered by secondary carriers. Please check your coverage and out of pocket expenses. Your pharmacy benefits may cover these vaccines so please check with your pharmacist.  Also consider contacting your local health department because it may stock these vaccines for a reasonable charge.     We currently have documentation of the following immunization history for you:  Immunization History   Administered Date(s) Administered    Influenza High Dose Vaccine PF 10/03/2016    Influenza Vaccine (Quad) PF 09/25/2015    Pneumococcal Conjugate (PCV-13) 09/25/2015    Pneumococcal Polysaccharide (PPSV-23) 10/03/2016    Pneumococcal Vaccine (Unspecified Type) 11/02/2004    Td 09/30/2013    Tdap 09/30/2013       A bone mass density test (DEXA) to screen for osteoporosis or thinning of the bones should be done at least once after age 72 and may be done up to every 2 years as determined by you and your health care provider. The most recent DEXA we have on file for you is:  DEXA Results (most recent): long time ago (~20 years ago)   Next due: can discuss any follow-up screening with Dr. Jeyson Herrera if needed  No results found for this or any previous visit. Screening for diabetes mellitus with a blood sugar test (glucose) should be done every year. If you have diabetes, this monitoring will be done more frequently (usually every 3-6 months) and will include A1C testing. The most recent blood glucose we have on file for you is: Lab Results   Component Value Date/Time    Glucose 108 09/05/2017 10:40 AM       Glaucoma is a disease of the eye due to increased ocular pressure that can lead to blindness. People with risk factors for glaucoma ( race,  American race, diabetes, family history) should be screened every year by an eye professional.   Last done: August 2017 per patient  Next due: follow-up in 6 months (Feb/March 2018) per Dr. Anatoliy Colon    Cardiovascular screening tests that check for elevated lipids or cholesterol (fatty part of blood) which can lead to heart disease and strokes should be done every 5 years.   The most recent lipid panel we have on file for you is:   Lab Results   Component Value Date/Time    Cholesterol, total 203 09/05/2017 10:40 AM    HDL Cholesterol 51 09/05/2017 10:40 AM    LDL, calculated 132.2 09/05/2017 10:40 AM    VLDL, calculated 19.8 09/05/2017 10:40 AM    Triglyceride 99 09/05/2017 10:40 AM    CHOL/HDL Ratio 4.0 09/05/2017 10:40 AM     Next due: per Dr. Dany Cardenas    Colorectal cancer screening that evaluates for blood or polyps in your colon for people with average risk should be done yearly as a stool test, every five years as a flexible sigmoidoscope or every 10 years as a colonoscopy up to age 76. You and your health care provider may decide whether to continue screening after age 76 or if you need to be screened more frequently. Last done: at least 5-10 years ago per patient   Next due: please discuss possible follow-up screening with Dr. Dany Cardenas    Breast cancer screening with a mammogram is recommended at least once every 2 years  for women age 54-69. You and your health care provider may decide whether to continue screening after age 76. The most recent mammogram we have on file for you is:   SUMMER Results (most recent): 1/25/2012  Next due: please discuss possible follow-up screening with Dr. Dany Cardenas    Screening for cervical cancer with a pap smear and pelvic exam is recommended for all women with a cervix until age 72. The frequency of this test is based on the details of your prior pap smear testing (usually every 1 or 2 years - more often with increased risk of cervical cancer or positive family history). You and your health care provider may decide whether to continue screening after age 72. Next due: no longer indicated (hysterectomy with removal of ovaries)    Screening for infection with Hepatitis C is recommended for anyone born between [birth year] through [address]. People ages 54 to [de-identified] years who have smoked the equivalent of 1 pack per day for 30 years or more may benefit from screening for lung cancer with a yearly low dose CT scan until they have been non smokers for 15 years. Please ask your health care provider if you have any questions.     Your Medicare Wellness Exam is recommended annually. If you do not have Advanced Directives on file with our office and you either have the completed document at home or you fill out the forms provided, please bring a copy to the office to be scanned into your record.     Here is a list of your current Health Maintenance items with a due date:  Health Maintenance Due   Topic Date Due    Stool testing for trace blood  10/01/1993    Shingles Vaccine  08/01/2003    Glaucoma Screening   10/01/2008    Bone Density Screening  10/01/2008    Annual Well Visit  10/01/2008    DTaP/Tdap/Td  (1 - Tdap) 10/01/2013    Breast Cancer Screening  01/25/2014    Flu Vaccine  08/01/2017

## 2017-09-14 NOTE — PROGRESS NOTES
Dr. Wilfrido Paz  referred Jenn Cooley (1945) a 66 y.o. female for a [facility] Visit (Marcel Sibley). Patient is accompanied by her  who also has an appointment with Dr. Manuel Peterson today. This is an Initial Medicare Annual Wellness Exam (AWV) (Performed 12 months after IPPE or effective date of Medicare Part B enrollment, Once in a lifetime)    I have reviewed the patient's medical history in detail and updated the computerized patient record. History     Past Medical History:   Diagnosis Date    Benign essential hypertension     Ill-defined condition     sciatica    Macular degeneration     followed by Dr. Dozier involving multiple sites     Restless legs syndrome       Past Surgical History:   Procedure Laterality Date    HX BREAST BIOPSY      benign    HX  SECTION      HX HYSTERECTOMY      HX OOPHORECTOMY Right      Current Outpatient Prescriptions   Medication Sig Dispense Refill    VIT A/VIT C/VIT E/ZINC/COPPER (OCUVITE PRESERVISION PO) Take 1 Tab by mouth daily.  cetirizine (ZYRTEC) 10 mg tablet Take 10 mg by mouth daily.  GLUCOSAMINE/CHONDR CROWE A SOD (OSTEO BI-FLEX PO) Take 2 Tabs by mouth daily.  Omega-3-DHA-EPA-Fish Oil (FISH OIL) 1,000 mg (120 mg-180 mg) cap Take 2 Caps by mouth daily.  naproxen sodium (ALEVE) 220 mg tablet Take 440 mg by mouth daily as needed for Pain.       hydroCHLOROthiazide (HYDRODIURIL) 25 mg tablet TAKE ONE TABLET BY MOUTH EVERY MORNING 90 Tab 2    propranolol (INDERAL) 20 mg tablet Take 1 tablet by mouth 2 times a day (Generic for inderal) 180 Tab 1     No Known Allergies  Family History   Problem Relation Age of Onset    Heart Disease Mother     Diabetes Mother      in later life   Joe Olivera Heart Disease Father     Hypertension Brother     Diabetes Maternal Aunt     Cancer Paternal Aunt      lung    Cancer Paternal Grandmother      breast and colon    Stroke Maternal Grandmother  Stroke Maternal Grandfather      Social History   Substance Use Topics    Smoking status: Never Smoker    Smokeless tobacco: Never Used    Alcohol use No     Patient Active Problem List   Diagnosis Code    Benign essential hypertension I10    Osteoarthrosis involving multiple sites M15.9    Restless legs syndrome G25.81         Depression Risk Factor Screening:     PHQ over the last two weeks 9/14/2017   Little interest or pleasure in doing things Not at all   Feeling down, depressed or hopeless Not at all   Total Score PHQ 2 0       Alcohol Risk Factor Screening: You do not drink alcohol or very rarely. Functional Ability and Level of Safety:     Hearing Loss   The patient wears hearing aids. Activities of Daily Living   The home contains: no safety equipment  Patient does total self care  ADL Assessment 9/14/2017   Feeding yourself No Help Needed   Getting from bed to chair No Help Needed   Getting dressed No Help Needed   Bathing or showering No Help Needed   Walk across the room (includes cane/walker) No Help Needed   Using the telphone No Help Needed   Taking your medications No Help Needed   Preparing meals No Help Needed   Managing money (expenses/bills) No Help Needed   Moderately strenuous housework (laundry) No Help Needed   Shopping for personal items (toiletries/medicines) No Help Needed   Shopping for groceries No Help Needed   Driving No Help Needed   Climbing a flight of stairs Help Needed   Getting to places beyond walking distances No Help Needed       Fall Risk     Fall Risk Assessment, last 12 mths 9/14/2017   Able to walk? Yes   Fall in past 12 months? No       Abuse Screen   Patient is not abused    Abuse Screening Questionnaire 9/14/2017   Do you ever feel afraid of your partner? N   Are you in a relationship with someone who physically or mentally threatens you? N   Is it safe for you to go home?  Y         Cognitive Screening   Evaluation of Cognitive Function:  Has your family/caregiver stated any concerns about your memory: no  Normal  Mood/affect:  neutral  Appearance: age appropriate, casually dressed and overweight  Family member/caregiver input:  present during visit but he has significant hearing difficulties and did not contribute to the visit    Physical Examination     No exam data present  Visit Vitals    /70 (BP 1 Location: Right arm, BP Patient Position: Sitting)    Pulse (!) 56    Temp 97.8 °F (36.6 °C) (Tympanic)    Resp 16    Ht 5' 7\" (1.702 m)    Wt 240 lb (108.9 kg)    SpO2 98%    BMI 37.59 kg/m2     No exam performed by pharmacist today, not required for Medicare Annual Wellness Visit. Patient to be seen by Dr. Hubert Kingsley separately. Patient Care Team     Patient Care Team:  Hubert Kingsley MD as PCP - General (Internal Medicine)  Long Landrum MD as Physician (Ophthalmology)  Georgie Jain MD as Physician (Neurosurgery)    Assessment/Plan     Education and counseling provided:  Are appropriate based on today's review and evaluation  End-of-Life planning (with patient's consent)  Influenza Vaccine  Screening Mammography  Colorectal cancer screening tests  Cardiovascular screening blood test  Bone mass measurement (DEXA)  Screening for glaucoma  Diabetes screening test  Zostavax vaccination recommendations    Diagnoses and all orders for this visit:    1. Initial Medicare annual wellness visit       2. Medication Reconciliation: Performed today.  Patient did not bring her medications to the visit but she had a detailed list.  During the patient interview, the following changes were made:    Discontinued: cyclobenzaprine, Norco, ropinirole   Additions: Ocuvite Preservision, fish oil, naproxen and Osteo Bi-flex   Changes / other discrepancies: none    Medications Discontinued During This Encounter   Medication Reason    cyclobenzaprine (FLEXERIL) 5 mg tablet Not A Current Medication    HYDROcodone-acetaminophen (NORCO) 5-325 mg per tablet Not A Current Medication    rOPINIRole (REQUIP) 0.5 mg tablet Not A Current Medication       3. Screenings and Immunizations (see patient instructions for chart/information):  Mammogram: last done 2012, due for repeat - advised patient to discuss with Dr. All Lima   Pap: no longer indicated (s/p hysterectomy and oophorectomy)   DEXA scan: done 10-20 years ago per patient, advised patient to discuss possible f/u screening with Dr. All Lima   Colonoscopy: at least 5-10 years ago per patient (family history of colon cancer), due for repeat - patient to discuss with Dr. All Lima  Glaucoma screening/Eye exam: Up to date per patient, due for repeat in 6 months per Dr. Madelin Martell panel: Up to date 9/5/2017, due for repeat per Dr. All Lima   Diabetes: Up to date 9/5/2017, due for repeat per Dr. Alivia White annual labs     Immunizations: Patient confirmed the following records of vaccinations are correct and current. Immunization History   Administered Date(s) Administered    Influenza High Dose Vaccine PF 10/03/2016    Influenza Vaccine (Quad) PF 09/25/2015    Pneumococcal Conjugate (PCV-13) 09/25/2015    Pneumococcal Polysaccharide (PPSV-23) 10/03/2016    Pneumococcal Vaccine (Unspecified Type) 11/02/2004    Td 09/30/2013    Tdap 09/30/2013       Pneumococcal:  Completed  Influenza:  Recommended that patient receive here or at her pharmacy - given today. Zoster:  Recommended that patient receive at her pharmacy and have pharmacy records faxed to the office. Tdap:  Per Deny Gtz last vaccine was Tdap 9/30/2013 (not Td as originally documented, immunization history updated). Td due in 2023. 4. Advanced Care Planning: Patient educated on the importance of an advanced care plan and paperwork was given to the patient today. Asked patient to read over the information and bring it back to her next appointment so it can be scanned into the chart. Patient verbalized understanding of information presented. Answered all of the patient's questions. AVS information was reviewed with patient and will be printed on checkout.     Mariana Hernandez, PharmD, BCACP    Health Maintenance Due   Topic Date Due    FOBT Q 1 YEAR AGE 50-75  10/01/1993    ZOSTER VACCINE AGE 60>  08/01/2003    GLAUCOMA SCREENING Q2Y  10/01/2008    OSTEOPOROSIS SCREENING (DEXA)  10/01/2008    MEDICARE YEARLY EXAM  10/01/2008    DTaP/Tdap/Td series (1 - Tdap) 10/01/2013    BREAST CANCER SCRN MAMMOGRAM  01/25/2014    INFLUENZA AGE 9 TO ADULT  08/01/2017

## 2017-09-14 NOTE — MR AVS SNAPSHOT
Visit Information Date & Time Provider Department Dept. Phone Encounter #  
 9/14/2017  1:00 PM Elmira uDenas MD [facility] [place] [phone] [id number] Your Appointments 2/8/2018 12:45 PM  
Follow Up with Elmira Duenas MD  
[facility] [place] Jules Sandhoff) Appt Note: 5mo  
 [address] [facility], [address]  
  
   
 [address] [facility], [address] [place] [address] Upcoming Health Maintenance Date Due FOBT Q 1 YEAR AGE 50-75 10/1/1993 ZOSTER VACCINE AGE 60> 8/1/2003 GLAUCOMA SCREENING Q2Y 10/1/2008 OSTEOPOROSIS SCREENING (DEXA) 10/1/2008 MEDICARE YEARLY EXAM 10/1/2008 DTaP/Tdap/Td series (1 - Tdap) 10/1/2013 BREAST CANCER SCRN MAMMOGRAM 1/25/2014 INFLUENZA AGE 9 TO ADULT 8/1/2017 Allergies as of 9/14/2017  Review Complete On: 9/14/2017 By: Janece Goltz, PHARMD  
 No Known Allergies Current Immunizations  Reviewed on 9/12/2017 Name Date Influenza High Dose Vaccine PF 10/3/2016 Influenza Vaccine (Quad) PF 9/25/2015 Pneumococcal Conjugate (PCV-13) 9/25/2015 Pneumococcal Polysaccharide (PPSV-23) 10/3/2016 Pneumococcal Vaccine (Unspecified Type) 11/2/2004 Td 9/30/2013 Tdap 9/30/2013 Not reviewed this visit You Were Diagnosed With   
  
 Codes Comments Initial Medicare annual wellness visit    -  Primary ICD-10-CM: Z00.00 ICD-9-CM: V70.0 Vitals BP Pulse Temp Resp Height(growth percentile) 124/70 (BP 1 Location: Right arm, BP Patient Position: Sitting) (!) 56 97.8 °F (36.6 °C) (Tympanic) 16 5' 7\" (1.702 m) Weight(growth percentile) SpO2 BMI OB Status Smoking Status 240 lb (108.9 kg) 98% 37.59 kg/m2 Postmenopausal Never Smoker Vitals History BMI and BSA Data Body Mass Index Body Surface Area  
 37.59 kg/m 2 2.27 m 2 Preferred Pharmacy Pharmacy Name Phone [id number], [address] [phone] Your Updated Medication List  
  
   
This list is accurate as of: 9/14/17  2:51 PM.  Always use your most recent med list.  
  
  
  
  
 ALEVE 220 mg tablet Generic drug:  naproxen sodium Take 440 mg by mouth daily as needed for Pain. FISH OIL 1,000 mg (120 mg-180 mg) Cap Generic drug:  Omega-3-DHA-EPA-Fish Oil Take 2 Caps by mouth daily. hydroCHLOROthiazide 25 mg tablet Commonly known as:  HYDRODIURIL  
TAKE ONE TABLET BY MOUTH EVERY MORNING  
  
 OCUVITE PRESERVISION PO Take 1 Tab by mouth daily. OSTEO BI-FLEX PO Take 2 Tabs by mouth daily. propranolol 20 mg tablet Commonly known as:  INDERAL Take 1 tablet by mouth 2 times a day (Generic for inderal) ZyrTEC 10 mg tablet Generic drug:  cetirizine Take 10 mg by mouth daily. Patient Instructions Medicare Wellness Visit, Female The best way to improve and maintain good health is to have a healthy lifestyle by eating a well-balanced diet, exercising regularly, limiting alcohol and stopping smoking. Regular physical exams and screening tests are another way to keep healthy. Preventive exams provided by your health care provider can find health problems before they become diseases or illnesses. Preventive services including immunizations, screening tests, monitoring and exams can help you take care of your own health. Preventive services such as immunizations prevent serious infections. All people over age 72 should have a Pneumovax and a Prevnar-13 shot to prevent potentially life threatening infections with the pneumococcus bacteria, a common cause of pneumonia. These are once in a lifetime unless you and your provider decide differently. Next due: Completed All people over 65 should have a yearly influenza vaccine or \"flu\" shot. This does not prevent infection with cold viruses but has been proven to prevent hospitalization and death from influenza. Next due: this season - can be given today Although Medicare part B \"regular Medicare\" currently only covers tetanus vaccination in the context of an injury, a tetanus vaccine (Tdap or Td) is recommended every 10 years. Tdap is generally given once in a lifetime for older adults. Next due: Td in 2023 A shingles vaccine is recommended once in a lifetime after age 61. The Shingles vaccine is also not covered by Medicare part B. Next due: Zostavax Note, however, that both the Shingles vaccine and Tdap/Td are generally covered by secondary carriers. Please check your coverage and out of pocket expenses. Your pharmacy benefits may cover these vaccines so please check with your pharmacist.  Also consider contacting your local health department because it may stock these vaccines for a reasonable charge. We currently have documentation of the following immunization history for you: 
Immunization History Administered Date(s) Administered  Influenza High Dose Vaccine PF 10/03/2016  Influenza Vaccine (Quad) PF 09/25/2015  Pneumococcal Conjugate (PCV-13) 09/25/2015  Pneumococcal Polysaccharide (PPSV-23) 10/03/2016  Pneumococcal Vaccine (Unspecified Type) 11/02/2004  Td 09/30/2013  Tdap 09/30/2013 A bone mass density test (DEXA) to screen for osteoporosis or thinning of the bones should be done at least once after age 72 and may be done up to every 2 years as determined by you and your health care provider. The most recent DEXA we have on file for you is: DEXA Results (most recent): long time ago (~20 years ago)   Next due: can discuss any follow-up screening with Dr. Vijaya Ellison if needed No results found for this or any previous visit.  
 
Screening for diabetes mellitus with a blood sugar test (glucose) should be done every year. If you have diabetes, this monitoring will be done more frequently (usually every 3-6 months) and will include A1C testing. The most recent blood glucose we have on file for you is: Lab Results Component Value Date/Time Glucose 108 09/05/2017 10:40 AM  
 
 
Glaucoma is a disease of the eye due to increased ocular pressure that can lead to blindness. People with risk factors for glaucoma ( race,  American race, diabetes, family history) should be screened every year by an eye professional.  
Last done: August 2017 per patient  Next due: follow-up in 6 months (Feb/March 2018) per Dr. Van Potter Cardiovascular screening tests that check for elevated lipids or cholesterol (fatty part of blood) which can lead to heart disease and strokes should be done every 5 years. The most recent lipid panel we have on file for you is:  
Lab Results Component Value Date/Time Cholesterol, total 203 09/05/2017 10:40 AM  
 HDL Cholesterol 51 09/05/2017 10:40 AM  
 LDL, calculated 132.2 09/05/2017 10:40 AM  
 VLDL, calculated 19.8 09/05/2017 10:40 AM  
 Triglyceride 99 09/05/2017 10:40 AM  
 CHOL/HDL Ratio 4.0 09/05/2017 10:40 AM  
 
Next due: per Dr. Alfreda Ambrosio Colorectal cancer screening that evaluates for blood or polyps in your colon for people with average risk should be done yearly as a stool test, every five years as a flexible sigmoidoscope or every 10 years as a colonoscopy up to age 76. You and your health care provider may decide whether to continue screening after age 76 or if you need to be screened more frequently. Last done: at least 5-10 years ago per patient Next due: please discuss possible follow-up screening with Dr. Alfreda Ambrosio Breast cancer screening with a mammogram is recommended at least once every 2 years  for women age 54-69. You and your health care provider may decide whether to continue screening after age 76.  The most recent mammogram we have on file for you is: [facility] Results (most recent): 1/25/2012  Next due: please discuss possible follow-up screening with Dr. Manuel Peterson Screening for cervical cancer with a pap smear and pelvic exam is recommended for all women with a cervix until age 72. The frequency of this test is based on the details of your prior pap smear testing (usually every 1 or 2 years - more often with increased risk of cervical cancer or positive family history). You and your health care provider may decide whether to continue screening after age 72. Next due: no longer indicated (hysterectomy with removal of ovaries) Screening for infection with Hepatitis C is recommended for anyone born between [birth year] through [address]. People ages 54 to [de-identified] years who have smoked the equivalent of 1 pack per day for 30 years or more may benefit from screening for lung cancer with a yearly low dose CT scan until they have been non smokers for 15 years. Please ask your health care provider if you have any questions. Your Medicare Wellness Exam is recommended annually. If you do not have Advanced Directives on file with our office and you either have the completed document at home or you fill out the forms provided, please bring a copy to the office to be scanned into your record. Here is a list of your current Health Maintenance items with a due date: 
Health Maintenance Due Topic Date Due  Stool testing for trace blood  10/01/1993  Shingles Vaccine  08/01/2003  Glaucoma Screening   10/01/2008  Bone Density Screening  10/01/2008 Joe Olivera Annual Well Visit  10/01/2008  DTaP/Tdap/Td  (1 - Tdap) 10/01/2013  Breast Cancer Screening  01/25/2014  Flu Vaccine  08/01/2017 Introducing [facility]! Bernadette Stanley introduces [employer] patient portal. Now you can access parts of your medical record, email your doctor's office, and request medication refills online.    
 
1. In your internet browser, go to [email or web address] 2. Click on the First Time User? Click Here link in the Sign In box. You will see the New Member Sign Up page. 3. Enter your [employer] Access Code exactly as it appears below. You will not need to use this code after youve completed the sign-up process. If you do not sign up before the expiration date, you must request a new code. · [employer] Access Code: [email or web address] Expires: 12/13/2017  2:51 PM 
 
4. Enter the last four digits of your Social Security Number (xxxx) and Date of Birth (mm/dd/yyyy) as indicated and click Submit. You will be taken to the next sign-up page. 5. Create a [employer] ID. This will be your [employer] login ID and cannot be changed, so think of one that is secure and easy to remember. 6. Create a [employer] password. You can change your password at any time. 7. Enter your Password Reset Question and Answer. This can be used at a later time if you forget your password. 8. Enter your e-mail address. You will receive e-mail notification when new information is available in [address] 9. Click Sign Up. You can now view and download portions of your medical record. 10. Click the Download Summary menu link to download a portable copy of your medical information. If you have questions, please visit the Frequently Asked Questions section of the [employer] website. Remember, [employer] is NOT to be used for urgent needs. For medical emergencies, dial 911. Now available from your iPhone and Android! Please provide this summary of care documentation to your next provider. Your primary care clinician is listed as Truman Gonzalez. If you have any questions after today's visit, please call [phone].

## 2017-09-15 ENCOUNTER — TELEPHONE (OUTPATIENT)
Dept: INTERNAL MEDICINE CLINIC | Age: 72
End: 2017-09-15

## 2017-09-15 DIAGNOSIS — Z12.31 ENCOUNTER FOR SCREENING MAMMOGRAM FOR MALIGNANT NEOPLASM OF BREAST: Primary | ICD-10-CM

## 2017-09-15 DIAGNOSIS — Z78.0 POST-MENOPAUSAL: ICD-10-CM

## 2017-09-15 PROBLEM — Z71.89 ADVANCE DIRECTIVE DISCUSSED WITH PATIENT: Status: ACTIVE | Noted: 2017-09-14

## 2017-09-17 NOTE — PROGRESS NOTES
The patient presents to the office today with the chief complaint of hypetension    HPI    The patient remains on medications for hypertension. she is tolerating the medications well. The patient has continued complaints of occasional lower back pain. A problem last week. The patient's back is now doing better. .        Review of Systems   Respiratory: Negative for shortness of breath. Cardiovascular: Negative for chest pain and leg swelling. Musculoskeletal: Positive for back pain. No Known Allergies    Current Outpatient Prescriptions   Medication Sig Dispense Refill    VIT A/VIT C/VIT E/ZINC/COPPER (OCUVITE PRESERVISION PO) Take 1 Tab by mouth daily.  cetirizine (ZYRTEC) 10 mg tablet Take 10 mg by mouth daily.  GLUCOSAMINE/CHONDR CROWE A SOD (OSTEO BI-FLEX PO) Take 2 Tabs by mouth daily.  Omega-3-DHA-EPA-Fish Oil (FISH OIL) 1,000 mg (120 mg-180 mg) cap Take 2 Caps by mouth daily.  naproxen sodium (ALEVE) 220 mg tablet Take 440 mg by mouth daily as needed for Pain.  hydroCHLOROthiazide (HYDRODIURIL) 25 mg tablet TAKE ONE TABLET BY MOUTH EVERY MORNING 90 Tab 2    propranolol (INDERAL) 20 mg tablet Take 1 tablet by mouth 2 times a day (Generic for inderal) 180 Tab 1       Past Medical History:   Diagnosis Date    Benign essential hypertension     Ill-defined condition     sciatica    Macular degeneration     followed by Dr. Potter Hem Osteoarthrosis involving multiple sites     Restless legs syndrome        Past Surgical History:   Procedure Laterality Date    HX BREAST BIOPSY      benign    HX  SECTION      HX HYSTERECTOMY      HX OOPHORECTOMY Right        Social History     Social History    Marital status:      Spouse name: N/A    Number of children: N/A    Years of education: N/A     Occupational History    Not on file.      Social History Main Topics    Smoking status: Never Smoker    Smokeless tobacco: Never Used    Alcohol use No    Drug use: No    Sexual activity: Yes     Partners: Male     Other Topics Concern    Not on file     Social History Narrative       Patient does not have an advanced directive on file    Visit Vitals    /70 (BP 1 Location: Right arm, BP Patient Position: Sitting)    Pulse (!) 56    Temp 97.8 °F (36.6 °C) (Tympanic)    Resp 16    Ht 5' 7\" (1.702 m)    Wt 240 lb (108.9 kg)    SpO2 98%    BMI 37.59 kg/m2       Physical Exam   No Cervical Lymphadenopathy  No Supraclavicular Lymphadenopathy  Thyroid is Normal  Lungs are clear to ausculation and percussion  Heart:  S1 S2 are normal, No gallops, No mummers  No Carotid Bruits  Abdomen:  Normal Bowel Sounds. No tenderness. No masses. No Hepatomegaly or Splenomegly  LE:  Strong Pedal Pulses. No Edema      BMI:  I have reviewed/discussed the above normal BMI with the patient. I have recommended the following interventions: dietary management education, guidance, and counseling . [facility] Outpatient Visit on 09/05/2017   Component Date Value Ref Range Status    WBC 09/05/2017 8.5  4.6 - 13.2 K/uL Final    RBC 09/05/2017 5.17  4.20 - 5.30 M/uL Final    HGB 09/05/2017 15.6  12.0 - 16.0 g/dL Final    HCT 09/05/2017 46.8* 35.0 - 45.0 % Final    MCV 09/05/2017 90.5  74.0 - 97.0 FL Final    MCH 09/05/2017 30.2  24.0 - 34.0 PG Final    MCHC 09/05/2017 33.3  31.0 - 37.0 g/dL Final    RDW 09/05/2017 14.2  11.6 - 14.5 % Final    PLATELET 35/66/5009 625  135 - 420 K/uL Final    MPV 09/05/2017 11.2  9.2 - 11.8 FL Final    NEUTROPHILS 09/05/2017 62  40 - 73 % Final    LYMPHOCYTES 09/05/2017 26  21 - 52 % Final    MONOCYTES 09/05/2017 7  3 - 10 % Final    EOSINOPHILS 09/05/2017 5  0 - 5 % Final    BASOPHILS 09/05/2017 0  0 - 2 % Final    ABS. NEUTROPHILS 09/05/2017 5.2  1.8 - 8.0 K/UL Final    ABS. LYMPHOCYTES 09/05/2017 2.2  0.9 - 3.6 K/UL Final    ABS. MONOCYTES 09/05/2017 0.6  0.05 - 1.2 K/UL Final    ABS.  EOSINOPHILS 09/05/2017 0.4  0.0 - 0.4 K/UL Final    ABS. BASOPHILS 09/05/2017 0.0  0.0 - 0.1 K/UL Final    DF 09/05/2017 AUTOMATED    Final    LIPID PROFILE 09/05/2017        Final    Cholesterol, total 09/05/2017 203* <200 MG/DL Final    Triglyceride 09/05/2017 99  <150 MG/DL Final    Comment: The drugs N-acetylcysteine (NAC) and  Metamiszole have been found to cause falsely  low results in this chemical assay. Please  be sure to submit blood samples obtained  BEFORE administration of either of these  drugs to assure correct results.  HDL Cholesterol 09/05/2017 51  40 - 60 MG/DL Final    LDL, calculated 09/05/2017 132.2* 0 - 100 MG/DL Final    VLDL, calculated 09/05/2017 19.8  MG/DL Final    CHOL/HDL Ratio 09/05/2017 4.0  0 - 5.0   Final    Sodium 09/05/2017 139  136 - 145 mmol/L Final    Potassium 09/05/2017 4.1  3.5 - 5.5 mmol/L Final    Chloride 09/05/2017 104  100 - 108 mmol/L Final    CO2 09/05/2017 25  21 - 32 mmol/L Final    Anion gap 09/05/2017 10  3.0 - 18 mmol/L Final    Glucose 09/05/2017 108* 74 - 99 mg/dL Final    BUN 09/05/2017 29* 7.0 - 18 MG/DL Final    Creatinine 09/05/2017 0.83  0.6 - 1.3 MG/DL Final    BUN/Creatinine ratio 09/05/2017 35* 12 - 20   Final    GFR est AA 09/05/2017 >60  >60 ml/min/1.73m2 Final    GFR est non-AA 09/05/2017 >60  >60 ml/min/1.73m2 Final    Comment: (NOTE)  Estimated GFR is calculated using the Modification of Diet in Renal   Disease (MDRD) Study equation, reported for both  Americans   (GFRAA) and non- Americans (GFRNA), and normalized to 1.73m2   body surface area. The physician must decide which value applies to   the patient. The MDRD study equation should only be used in   individuals age 25 or older. It has not been validated for the   following: pregnant women, patients with serious comorbid conditions,   or on certain medications, or persons with extremes of body size,   muscle mass, or nutritional status.       Calcium 09/05/2017 9.0  8.5 - 10.1 MG/DL Final  Bilirubin, total 09/05/2017 0.4  0.2 - 1.0 MG/DL Final    ALT (SGPT) 09/05/2017 25  13 - 56 U/L Final    AST (SGOT) 09/05/2017 15  15 - 37 U/L Final    Alk. phosphatase 09/05/2017 75  45 - 117 U/L Final    Protein, total 09/05/2017 7.3  6.4 - 8.2 g/dL Final    Albumin 09/05/2017 3.8  3.4 - 5.0 g/dL Final    Globulin 09/05/2017 3.5  2.0 - 4.0 g/dL Final    A-G Ratio 09/05/2017 1.1  0.8 - 1.7   Final       .No results found for any visits on 09/14/17. Assessment / Plan      ICD-10-CM ICD-9-CM    1. Initial Medicare annual wellness visit Z00.00 V70.0    2. Encounter for immunization Z23 V03.89 INFLUENZA VIRUS VACCINE, HIGH DOSE SEASONAL, PRESERVATIVE FREE      ADMIN INFLUENZA VIRUS VAC   3. Advance directive discussed with patient Z71.89 V65.49    4. Benign essential hypertension I10 401.1      Flu shot given  she was advised to continue her maintenance medications    Follow-up Disposition:  Return in about 6 months (around 3/14/2018). I asked Jessica Benito if she has any questions and I answered the questions. Dottie Benito states that she understands the treatment plan and agrees with the treatment plan

## 2017-09-27 ENCOUNTER — HOSPITAL ENCOUNTER (OUTPATIENT)
Dept: MAMMOGRAPHY | Age: 72
Discharge: HOME | End: 2017-09-27
Attending: INTERNAL MEDICINE
Payer: MEDICARE

## 2017-09-27 ENCOUNTER — HOSPITAL ENCOUNTER (OUTPATIENT)
Dept: BONE DENSITY | Age: 72
Discharge: HOME | End: 2017-09-27
Attending: INTERNAL MEDICINE
Payer: MEDICARE

## 2017-09-27 DIAGNOSIS — Z12.31 ENCOUNTER FOR SCREENING MAMMOGRAM FOR MALIGNANT NEOPLASM OF BREAST: ICD-10-CM

## 2017-09-27 DIAGNOSIS — Z78.0 POST-MENOPAUSAL: ICD-10-CM

## 2017-09-27 PROCEDURE — 77080 DXA BONE DENSITY AXIAL: CPT

## 2017-09-27 PROCEDURE — 77063 BREAST TOMOSYNTHESIS BI: CPT

## 2017-12-12 ENCOUNTER — OFFICE VISIT (OUTPATIENT)
Dept: INTERNAL MEDICINE CLINIC | Age: 72
End: 2017-12-12

## 2017-12-12 VITALS
TEMPERATURE: 98.7 F | SYSTOLIC BLOOD PRESSURE: 148 MMHG | WEIGHT: 246 LBS | RESPIRATION RATE: 16 BRPM | DIASTOLIC BLOOD PRESSURE: 86 MMHG | OXYGEN SATURATION: 97 % | HEART RATE: 61 BPM | HEIGHT: 67 IN | BODY MASS INDEX: 38.61 KG/M2

## 2017-12-12 DIAGNOSIS — J06.9 ACUTE URI: Primary | ICD-10-CM

## 2017-12-12 RX ORDER — FLUTICASONE PROPIONATE 50 MCG
SPRAY, SUSPENSION (ML) NASAL
Qty: 1 BOTTLE | Refills: 1 | Status: SHIPPED | OUTPATIENT
Start: 2017-12-12 | End: 2019-03-07

## 2017-12-12 RX ORDER — CODEINE PHOSPHATE AND GUAIFENESIN 10; 100 MG/5ML; MG/5ML
5 SOLUTION ORAL
Qty: 118 ML | Refills: 0 | Status: SHIPPED | OUTPATIENT
Start: 2017-12-12 | End: 2019-03-07

## 2017-12-12 RX ORDER — AZITHROMYCIN 250 MG/1
TABLET, FILM COATED ORAL
Qty: 6 TAB | Refills: 0 | Status: SHIPPED | OUTPATIENT
Start: 2017-12-12 | End: 2018-02-08 | Stop reason: ALTCHOICE

## 2017-12-12 NOTE — PROGRESS NOTES
Liz Oakes is a 74 y.o. female presenting today for Nasal Congestion (x4 days)  . HPI:  Liz Oakes presents to the office today for nasal congestion, non-productive cough, sore throat and headache x 4 days. Patient denies any fever. Review of Systems   Constitutional: Negative for chills and fever. HENT: Positive for congestion and sore throat. Respiratory: Positive for cough and shortness of breath (with exertion). Negative for sputum production. Cardiovascular: Negative for chest pain and palpitations. Neurological: Positive for headaches. No Known Allergies    Current Outpatient Prescriptions   Medication Sig Dispense Refill    fluticasone (FLONASE) 50 mcg/actuation nasal spray 2 spray each nostril daily 1 Bottle 1    guaiFENesin-codeine (ROBITUSSIN AC) 100-10 mg/5 mL solution Take 5 mL by mouth three (3) times daily as needed for Cough. Max Daily Amount: 15 mL. Do not drive if taking this medication 118 mL 0    azithromycin (ZITHROMAX) 250 mg tablet Take 2 tablets today, then take 1 tablet daily 6 Tab 0    VIT A/VIT C/VIT E/ZINC/COPPER (OCUVITE PRESERVISION PO) Take 1 Tab by mouth daily.  cetirizine (ZYRTEC) 10 mg tablet Take 10 mg by mouth daily.  GLUCOSAMINE/CHONDR CROWE A SOD (OSTEO BI-FLEX PO) Take 2 Tabs by mouth daily.  Omega-3-DHA-EPA-Fish Oil (FISH OIL) 1,000 mg (120 mg-180 mg) cap Take 2 Caps by mouth daily.  naproxen sodium (ALEVE) 220 mg tablet Take 440 mg by mouth daily as needed for Pain.       hydroCHLOROthiazide (HYDRODIURIL) 25 mg tablet TAKE ONE TABLET BY MOUTH EVERY MORNING 90 Tab 2    propranolol (INDERAL) 20 mg tablet Take 1 tablet by mouth 2 times a day (Generic for inderal) 180 Tab 1       Past Medical History:   Diagnosis Date    Benign essential hypertension     Ill-defined condition     sciatica    Macular degeneration 2015    followed by Dr. Dyan Diaz Osteoarthrosis involving multiple sites     Restless legs syndrome        Past Surgical History:   Procedure Laterality Date    HX BREAST BIOPSY      benign    HX  SECTION      HX HYSTERECTOMY      HX OOPHORECTOMY Right        Social History     Social History    Marital status:      Spouse name: N/A    Number of children: N/A    Years of education: N/A     Occupational History    Not on file. Social History Main Topics    Smoking status: Never Smoker    Smokeless tobacco: Never Used    Alcohol use No    Drug use: No    Sexual activity: Yes     Partners: Male     Other Topics Concern    Not on file     Social History Narrative       Patient does not have an advanced directive on file    Vitals:    17 1236   BP: 148/86   Pulse: 61   Resp: 16   Temp: 98.7 °F (37.1 °C)   TempSrc: Tympanic   SpO2: 97%   Weight: 246 lb (111.6 kg)   Height: 5' 7\" (1.702 m)   PainSc:   0 - No pain       Physical Exam   Constitutional: No distress. HENT:   Right Ear: External ear normal.   Left Ear: External ear normal.   Mouth/Throat: Posterior oropharyngeal erythema (mild with post nasal drip) present. Cardiovascular: Normal rate, regular rhythm and normal heart sounds. Pulmonary/Chest: She has rhonchi in the right lower field. Lymphadenopathy:     She has no cervical adenopathy. No visits with results within 3 Month(s) from this visit.   Latest known visit with results is:    Hospital Outpatient Visit on 2017   Component Date Value Ref Range Status    WBC 2017 8.5  4.6 - 13.2 K/uL Final    RBC 2017 5.17  4.20 - 5.30 M/uL Final    HGB 2017 15.6  12.0 - 16.0 g/dL Final    HCT 2017 46.8* 35.0 - 45.0 % Final    MCV 2017 90.5  74.0 - 97.0 FL Final    MCH 2017 30.2  24.0 - 34.0 PG Final    MCHC 2017 33.3  31.0 - 37.0 g/dL Final    RDW 2017 14.2  11.6 - 14.5 % Final    PLATELET  533  135 - 420 K/uL Final    MPV 2017 11.2  9.2 - 11.8 FL Final    NEUTROPHILS 09/05/2017 62  40 - 73 % Final    LYMPHOCYTES 09/05/2017 26  21 - 52 % Final    MONOCYTES 09/05/2017 7  3 - 10 % Final    EOSINOPHILS 09/05/2017 5  0 - 5 % Final    BASOPHILS 09/05/2017 0  0 - 2 % Final    ABS. NEUTROPHILS 09/05/2017 5.2  1.8 - 8.0 K/UL Final    ABS. LYMPHOCYTES 09/05/2017 2.2  0.9 - 3.6 K/UL Final    ABS. MONOCYTES 09/05/2017 0.6  0.05 - 1.2 K/UL Final    ABS. EOSINOPHILS 09/05/2017 0.4  0.0 - 0.4 K/UL Final    ABS. BASOPHILS 09/05/2017 0.0  0.0 - 0.1 K/UL Final    DF 09/05/2017 AUTOMATED    Final    LIPID PROFILE 09/05/2017        Final    Cholesterol, total 09/05/2017 203* <200 MG/DL Final    Triglyceride 09/05/2017 99  <150 MG/DL Final    Comment: The drugs N-acetylcysteine (NAC) and  Metamiszole have been found to cause falsely  low results in this chemical assay. Please  be sure to submit blood samples obtained  BEFORE administration of either of these  drugs to assure correct results.       HDL Cholesterol 09/05/2017 51  40 - 60 MG/DL Final    LDL, calculated 09/05/2017 132.2* 0 - 100 MG/DL Final    VLDL, calculated 09/05/2017 19.8  MG/DL Final    CHOL/HDL Ratio 09/05/2017 4.0  0 - 5.0   Final    Sodium 09/05/2017 139  136 - 145 mmol/L Final    Potassium 09/05/2017 4.1  3.5 - 5.5 mmol/L Final    Chloride 09/05/2017 104  100 - 108 mmol/L Final    CO2 09/05/2017 25  21 - 32 mmol/L Final    Anion gap 09/05/2017 10  3.0 - 18 mmol/L Final    Glucose 09/05/2017 108* 74 - 99 mg/dL Final    BUN 09/05/2017 29* 7.0 - 18 MG/DL Final    Creatinine 09/05/2017 0.83  0.6 - 1.3 MG/DL Final    BUN/Creatinine ratio 09/05/2017 35* 12 - 20   Final    GFR est AA 09/05/2017 >60  >60 ml/min/1.73m2 Final    GFR est non-AA 09/05/2017 >60  >60 ml/min/1.73m2 Final    Comment: (NOTE)  Estimated GFR is calculated using the Modification of Diet in Renal   Disease (MDRD) Study equation, reported for both  Americans   (GFRAA) and non- Americans (GFRNA), and normalized to 1.73m2 body surface area. The physician must decide which value applies to   the patient. The MDRD study equation should only be used in   individuals age 25 or older. It has not been validated for the   following: pregnant women, patients with serious comorbid conditions,   or on certain medications, or persons with extremes of body size,   muscle mass, or nutritional status.  Calcium 09/05/2017 9.0  8.5 - 10.1 MG/DL Final    Bilirubin, total 09/05/2017 0.4  0.2 - 1.0 MG/DL Final    ALT (SGPT) 09/05/2017 25  13 - 56 U/L Final    AST (SGOT) 09/05/2017 15  15 - 37 U/L Final    Alk. phosphatase 09/05/2017 75  45 - 117 U/L Final    Protein, total 09/05/2017 7.3  6.4 - 8.2 g/dL Final    Albumin 09/05/2017 3.8  3.4 - 5.0 g/dL Final    Globulin 09/05/2017 3.5  2.0 - 4.0 g/dL Final    A-G Ratio 09/05/2017 1.1  0.8 - 1.7   Final       .No results found for any visits on 12/12/17. Assessment / Plan:      ICD-10-CM ICD-9-CM    1. Acute URI J06.9 465.9 fluticasone (FLONASE) 50 mcg/actuation nasal spray      guaiFENesin-codeine (ROBITUSSIN AC) 100-10 mg/5 mL solution      azithromycin (ZITHROMAX) 250 mg tablet     Flonase rx  Cheratussin rx  Zpak rx  HTN- elevated. Patient coughing throughout visit. Will continue to monitor    Follow-up Disposition:  Return if symptoms worsen or fail to improve. I asked the patient if she  had any questions and answered her  questions.   The patient stated that she understands the treatment plan and agrees with the treatment plan

## 2018-01-01 DIAGNOSIS — E78.00 PURE HYPERCHOLESTEROLEMIA: ICD-10-CM

## 2018-01-01 DIAGNOSIS — I10 BENIGN ESSENTIAL HYPERTENSION: ICD-10-CM

## 2018-01-23 DIAGNOSIS — I10 BENIGN ESSENTIAL HYPERTENSION: Primary | ICD-10-CM

## 2018-01-23 DIAGNOSIS — E78.00 PURE HYPERCHOLESTEROLEMIA: ICD-10-CM

## 2018-01-26 ENCOUNTER — LAB ONLY (OUTPATIENT)
Dept: INTERNAL MEDICINE CLINIC | Age: 73
End: 2018-01-26

## 2018-01-26 ENCOUNTER — HOSPITAL ENCOUNTER (OUTPATIENT)
Dept: LAB | Age: 73
Discharge: HOME | End: 2018-01-26
Payer: MEDICARE

## 2018-01-26 DIAGNOSIS — Z78.0 POST-MENOPAUSAL: ICD-10-CM

## 2018-01-26 DIAGNOSIS — E78.00 PURE HYPERCHOLESTEROLEMIA: ICD-10-CM

## 2018-01-26 DIAGNOSIS — Z12.31 ENCOUNTER FOR SCREENING MAMMOGRAM FOR MALIGNANT NEOPLASM OF BREAST: ICD-10-CM

## 2018-01-26 DIAGNOSIS — J06.9 ACUTE URI: Primary | ICD-10-CM

## 2018-01-26 DIAGNOSIS — I10 BENIGN ESSENTIAL HYPERTENSION: ICD-10-CM

## 2018-01-26 DIAGNOSIS — G25.81 RESTLESS LEGS SYNDROME: ICD-10-CM

## 2018-01-26 LAB
ALBUMIN SERPL-MCNC: 3.8 G/DL (ref 3.4–5)
ALBUMIN/GLOB SERPL: 1.2 {RATIO} (ref 0.8–1.7)
ALP SERPL-CCNC: 78 U/L (ref 45–117)
ALT SERPL-CCNC: 20 U/L (ref 13–56)
ANION GAP SERPL CALC-SCNC: 9 MMOL/L (ref 3–18)
AST SERPL-CCNC: 11 U/L (ref 15–37)
BASOPHILS # BLD: 0 K/UL (ref 0–0.06)
BASOPHILS NFR BLD: 1 % (ref 0–2)
BILIRUB SERPL-MCNC: 0.7 MG/DL (ref 0.2–1)
BUN SERPL-MCNC: 17 MG/DL (ref 7–18)
BUN/CREAT SERPL: 23 (ref 12–20)
CALCIUM SERPL-MCNC: 9.1 MG/DL (ref 8.5–10.1)
CHLORIDE SERPL-SCNC: 106 MMOL/L (ref 100–108)
CHOLEST SERPL-MCNC: 180 MG/DL
CO2 SERPL-SCNC: 31 MMOL/L (ref 21–32)
CREAT SERPL-MCNC: 0.75 MG/DL (ref 0.6–1.3)
DIFFERENTIAL METHOD BLD: ABNORMAL
EOSINOPHIL # BLD: 0.3 K/UL (ref 0–0.4)
EOSINOPHIL NFR BLD: 4 % (ref 0–5)
ERYTHROCYTE [DISTWIDTH] IN BLOOD BY AUTOMATED COUNT: 13.8 % (ref 11.6–14.5)
GLOBULIN SER CALC-MCNC: 3.2 G/DL (ref 2–4)
GLUCOSE SERPL-MCNC: 101 MG/DL (ref 74–99)
HCT VFR BLD AUTO: 46.2 % (ref 35–45)
HDLC SERPL-MCNC: 48 MG/DL (ref 40–60)
HDLC SERPL: 3.8 {RATIO} (ref 0–5)
HGB BLD-MCNC: 14.8 G/DL (ref 12–16)
LDLC SERPL CALC-MCNC: 109.4 MG/DL (ref 0–100)
LIPID PROFILE,FLP: ABNORMAL
LYMPHOCYTES # BLD: 1.9 K/UL (ref 0.9–3.6)
LYMPHOCYTES NFR BLD: 26 % (ref 21–52)
MCH RBC QN AUTO: 30.3 PG (ref 24–34)
MCHC RBC AUTO-ENTMCNC: 32 G/DL (ref 31–37)
MCV RBC AUTO: 94.5 FL (ref 74–97)
MONOCYTES # BLD: 0.5 K/UL (ref 0.05–1.2)
MONOCYTES NFR BLD: 7 % (ref 3–10)
NEUTS SEG # BLD: 4.8 K/UL (ref 1.8–8)
NEUTS SEG NFR BLD: 62 % (ref 40–73)
PLATELET # BLD AUTO: 273 K/UL (ref 135–420)
PMV BLD AUTO: 11.7 FL (ref 9.2–11.8)
POTASSIUM SERPL-SCNC: 4.6 MMOL/L (ref 3.5–5.5)
PROT SERPL-MCNC: 7 G/DL (ref 6.4–8.2)
RBC # BLD AUTO: 4.89 M/UL (ref 4.2–5.3)
SODIUM SERPL-SCNC: 146 MMOL/L (ref 136–145)
TRIGL SERPL-MCNC: 113 MG/DL (ref ?–150)
VLDLC SERPL CALC-MCNC: 22.6 MG/DL
WBC # BLD AUTO: 7.5 K/UL (ref 4.6–13.2)

## 2018-01-26 PROCEDURE — 80061 LIPID PANEL: CPT | Performed by: INTERNAL MEDICINE

## 2018-01-26 PROCEDURE — 80053 COMPREHEN METABOLIC PANEL: CPT | Performed by: INTERNAL MEDICINE

## 2018-01-26 PROCEDURE — 85025 COMPLETE CBC W/AUTO DIFF WBC: CPT | Performed by: INTERNAL MEDICINE

## 2018-02-08 ENCOUNTER — OFFICE VISIT (OUTPATIENT)
Dept: INTERNAL MEDICINE CLINIC | Age: 73
End: 2018-02-08

## 2018-02-08 VITALS
DIASTOLIC BLOOD PRESSURE: 80 MMHG | OXYGEN SATURATION: 96 % | HEART RATE: 69 BPM | RESPIRATION RATE: 17 BRPM | SYSTOLIC BLOOD PRESSURE: 142 MMHG | BODY MASS INDEX: 37.2 KG/M2 | HEIGHT: 67 IN | TEMPERATURE: 100.8 F | WEIGHT: 237 LBS

## 2018-02-08 DIAGNOSIS — J11.1 INFLUENZA: Primary | ICD-10-CM

## 2018-02-08 DIAGNOSIS — I10 BENIGN ESSENTIAL HYPERTENSION: ICD-10-CM

## 2018-02-08 RX ORDER — CEFUROXIME AXETIL 500 MG/1
TABLET ORAL
Qty: 14 TAB | Refills: 0 | Status: SHIPPED | OUTPATIENT
Start: 2018-02-08 | End: 2019-03-07 | Stop reason: ALTCHOICE

## 2018-02-08 RX ORDER — OSELTAMIVIR PHOSPHATE 75 MG/1
CAPSULE ORAL
Qty: 10 CAP | Refills: 0 | Status: SHIPPED | OUTPATIENT
Start: 2018-02-08 | End: 2019-03-07 | Stop reason: ALTCHOICE

## 2018-02-08 NOTE — PROGRESS NOTES
Identified pt with two pt identifiers(name and ). Reviewed record in preparation for visit and have obtained necessary documentation. Chief Complaint   Patient presents with    Hypertension     (Rm#1) 6mo f/u    Cold Symptoms     fever, nasal congestion, headache, eye burning, unproductive cough since Tuesday; sore throat only on Tuesday; pt denies body aches, nausea, vomiting        Coordination of Care Questionnaire:  :   1) Have you been to an emergency room, urgent care clinic since your last visit? no   Hospitalized since your last visit? no             2. Have seen or consulted any other health care provider since your last visit? NO  If yes, where when, and reason for visit? Patient is accompanied by self I have received verbal consent from Katelin Cai to discuss any/all medical information while they are present in the room.

## 2018-02-10 NOTE — PROGRESS NOTES
The patient presents to the office today with the chief complaint of influenza    HPI    The patient has been exposed to influenza. She now has chills, fever and cough. Review of Systems   Respiratory: Positive for cough. Negative for shortness of breath. Cardiovascular: Negative for chest pain and leg swelling. No Known Allergies    Current Outpatient Prescriptions   Medication Sig Dispense Refill    oseltamivir (TAMIFLU) 75 mg capsule 1 capsule twice per day 10 Cap 0    cefUROXime (CEFTIN) 500 mg tablet 1 tablet twice per day 14 Tab 0    fluticasone (FLONASE) 50 mcg/actuation nasal spray 2 spray each nostril daily 1 Bottle 1    guaiFENesin-codeine (ROBITUSSIN AC) 100-10 mg/5 mL solution Take 5 mL by mouth three (3) times daily as needed for Cough. Max Daily Amount: 15 mL. Do not drive if taking this medication 118 mL 0    VIT A/VIT C/VIT E/ZINC/COPPER (OCUVITE PRESERVISION PO) Take 1 Tab by mouth daily.  cetirizine (ZYRTEC) 10 mg tablet Take 10 mg by mouth daily.  GLUCOSAMINE/CHONDR CROWE A SOD (OSTEO BI-FLEX PO) Take 2 Tabs by mouth daily.  Omega-3-DHA-EPA-Fish Oil (FISH OIL) 1,000 mg (120 mg-180 mg) cap Take 2 Caps by mouth daily.  hydroCHLOROthiazide (HYDRODIURIL) 25 mg tablet TAKE ONE TABLET BY MOUTH EVERY MORNING 90 Tab 2    propranolol (INDERAL) 20 mg tablet Take 1 tablet by mouth 2 times a day (Generic for inderal) 180 Tab 1    naproxen sodium (ALEVE) 220 mg tablet Take 440 mg by mouth daily as needed for Pain.          Past Medical History:   Diagnosis Date    Benign essential hypertension     Ill-defined condition     sciatica    Macular degeneration     followed by Dr. Justine Stratton involving multiple sites     Restless legs syndrome        Past Surgical History:   Procedure Laterality Date    HX BREAST BIOPSY      benign    HX  SECTION      HX HYSTERECTOMY      HX OOPHORECTOMY Right        Social History     Social History    Marital status:      Spouse name: N/A    Number of children: N/A    Years of education: N/A     Occupational History    Not on file. Social History Main Topics    Smoking status: Never Smoker    Smokeless tobacco: Never Used    Alcohol use No    Drug use: No    Sexual activity: Yes     Partners: Male     Other Topics Concern    Not on file     Social History Narrative       Patient does not have an advanced directive on file    Visit Vitals    /80 (BP 1 Location: Left arm, BP Patient Position: Sitting)    Pulse 69    Temp (!) 100.8 °F (38.2 °C) (Tympanic)    Resp 17    Ht 5' 7\" (1.702 m)    Wt 237 lb (107.5 kg)    SpO2 96%    BMI 37.12 kg/m2       Physical Exam   Cardiovascular: Normal rate and regular rhythm. Exam reveals no gallop. No murmur heard. Pulmonary/Chest: She has no wheezes. She has no rales. Abdominal: Soft. She exhibits no distension. There is no tenderness. Musculoskeletal: She exhibits no edema. BMI:  BMI is high but it was not addressed during this visit due to an acute illness      Hospital Outpatient Visit on 01/26/2018   Component Date Value Ref Range Status    WBC 01/26/2018 7.5  4.6 - 13.2 K/uL Final    RBC 01/26/2018 4.89  4.20 - 5.30 M/uL Final    HGB 01/26/2018 14.8  12.0 - 16.0 g/dL Final    HCT 01/26/2018 46.2* 35.0 - 45.0 % Final    MCV 01/26/2018 94.5  74.0 - 97.0 FL Final    MCH 01/26/2018 30.3  24.0 - 34.0 PG Final    MCHC 01/26/2018 32.0  31.0 - 37.0 g/dL Final    RDW 01/26/2018 13.8  11.6 - 14.5 % Final    PLATELET 91/24/4930 969  135 - 420 K/uL Final    MPV 01/26/2018 11.7  9.2 - 11.8 FL Final    NEUTROPHILS 01/26/2018 62  40 - 73 % Final    LYMPHOCYTES 01/26/2018 26  21 - 52 % Final    MONOCYTES 01/26/2018 7  3 - 10 % Final    EOSINOPHILS 01/26/2018 4  0 - 5 % Final    BASOPHILS 01/26/2018 1  0 - 2 % Final    ABS. NEUTROPHILS 01/26/2018 4.8  1.8 - 8.0 K/UL Final    ABS.  LYMPHOCYTES 01/26/2018 1.9  0.9 - 3.6 K/UL Final    ABS. MONOCYTES 01/26/2018 0.5  0.05 - 1.2 K/UL Final    ABS. EOSINOPHILS 01/26/2018 0.3  0.0 - 0.4 K/UL Final    ABS. BASOPHILS 01/26/2018 0.0  0.0 - 0.06 K/UL Final    DF 01/26/2018 AUTOMATED    Final    Sodium 01/26/2018 146* 136 - 145 mmol/L Final    Potassium 01/26/2018 4.6  3.5 - 5.5 mmol/L Final    Chloride 01/26/2018 106  100 - 108 mmol/L Final    CO2 01/26/2018 31  21 - 32 mmol/L Final    Anion gap 01/26/2018 9  3.0 - 18 mmol/L Final    Glucose 01/26/2018 101* 74 - 99 mg/dL Final    BUN 01/26/2018 17  7.0 - 18 MG/DL Final    Creatinine 01/26/2018 0.75  0.6 - 1.3 MG/DL Final    BUN/Creatinine ratio 01/26/2018 23* 12 - 20   Final    GFR est AA 01/26/2018 >60  >60 ml/min/1.73m2 Final    GFR est non-AA 01/26/2018 >60  >60 ml/min/1.73m2 Final    Comment: (NOTE)  Estimated GFR is calculated using the Modification of Diet in Renal   Disease (MDRD) Study equation, reported for both  Americans   (GFRAA) and non- Americans (GFRNA), and normalized to 1.73m2   body surface area. The physician must decide which value applies to   the patient. The MDRD study equation should only be used in   individuals age 25 or older. It has not been validated for the   following: pregnant women, patients with serious comorbid conditions,   or on certain medications, or persons with extremes of body size,   muscle mass, or nutritional status.  Calcium 01/26/2018 9.1  8.5 - 10.1 MG/DL Final    Bilirubin, total 01/26/2018 0.7  0.2 - 1.0 MG/DL Final    ALT (SGPT) 01/26/2018 20  13 - 56 U/L Final    AST (SGOT) 01/26/2018 11* 15 - 37 U/L Final    Alk.  phosphatase 01/26/2018 78  45 - 117 U/L Final    Protein, total 01/26/2018 7.0  6.4 - 8.2 g/dL Final    Albumin 01/26/2018 3.8  3.4 - 5.0 g/dL Final    Globulin 01/26/2018 3.2  2.0 - 4.0 g/dL Final    A-G Ratio 01/26/2018 1.2  0.8 - 1.7   Final    LIPID PROFILE 01/26/2018        Final    Cholesterol, total 01/26/2018 180  <200 MG/DL Final    Triglyceride 01/26/2018 113  <150 MG/DL Final    Comment: The drugs N-acetylcysteine (NAC) and  Metamiszole have been found to cause falsely  low results in this chemical assay. Please  be sure to submit blood samples obtained  BEFORE administration of either of these  drugs to assure correct results.  HDL Cholesterol 01/26/2018 48  40 - 60 MG/DL Final    LDL, calculated 01/26/2018 109.4* 0 - 100 MG/DL Final    VLDL, calculated 01/26/2018 22.6  MG/DL Final    CHOL/HDL Ratio 01/26/2018 3.8  0 - 5.0   Final       .No results found for any visits on 02/08/18. Assessment / Plan      ICD-10-CM ICD-9-CM    1. Influenza J11.1 487.1    2. Benign essential hypertension I10 401.1        Tamiflu  Ceftin  she was advised to continue her maintenance medications  she is to call if symptoms persist over five days      Follow-up Disposition:  Return in about 4 months (around 6/8/2018). I asked Rhonda Hercules if she has any questions and I answered the questions. Dottie Hercules states that she understands the treatment plan and agrees with the treatment plan

## 2018-02-23 RX ORDER — PROPRANOLOL HYDROCHLORIDE 20 MG/1
TABLET ORAL
Qty: 180 TAB | Refills: 0 | Status: SHIPPED | OUTPATIENT
Start: 2018-02-23 | End: 2018-04-06 | Stop reason: SDUPTHER

## 2018-04-06 RX ORDER — PROPRANOLOL HYDROCHLORIDE 20 MG/1
TABLET ORAL
Qty: 180 TAB | Refills: 0 | Status: SHIPPED | OUTPATIENT
Start: 2018-04-06 | End: 2018-09-01 | Stop reason: SDUPTHER

## 2018-04-06 RX ORDER — HYDROCHLOROTHIAZIDE 25 MG/1
TABLET ORAL
Qty: 90 TAB | Refills: 0 | Status: SHIPPED | OUTPATIENT
Start: 2018-04-06 | End: 2018-08-09 | Stop reason: SDUPTHER

## 2018-08-10 RX ORDER — HYDROCHLOROTHIAZIDE 25 MG/1
TABLET ORAL
Qty: 90 TAB | Refills: 0 | Status: SHIPPED | OUTPATIENT
Start: 2018-08-10 | End: 2018-11-08 | Stop reason: SDUPTHER

## 2018-09-04 RX ORDER — PROPRANOLOL HYDROCHLORIDE 20 MG/1
TABLET ORAL
Qty: 180 TAB | Refills: 0 | Status: SHIPPED | OUTPATIENT
Start: 2018-09-04 | End: 2018-12-11 | Stop reason: SDUPTHER

## 2018-09-11 ENCOUNTER — OFFICE VISIT (OUTPATIENT)
Dept: INTERNAL MEDICINE CLINIC | Age: 73
End: 2018-09-11

## 2018-09-11 VITALS
OXYGEN SATURATION: 97 % | TEMPERATURE: 97.8 F | HEIGHT: 67 IN | HEART RATE: 51 BPM | DIASTOLIC BLOOD PRESSURE: 80 MMHG | SYSTOLIC BLOOD PRESSURE: 150 MMHG | WEIGHT: 238 LBS | BODY MASS INDEX: 37.35 KG/M2

## 2018-09-11 DIAGNOSIS — I10 BENIGN ESSENTIAL HYPERTENSION: Primary | ICD-10-CM

## 2018-09-11 DIAGNOSIS — Z23 ENCOUNTER FOR IMMUNIZATION: ICD-10-CM

## 2018-09-11 NOTE — PROGRESS NOTES
Rashaad Storey presents today for Chief Complaint Patient presents with  Mikhail Woman Rashaad Storey preferred language for health care discussion is english. Is someone accompanying this pt? Yes  Is the patient using any DME equipment during OV? no 
 
Depression Screening: PHQ over the last two weeks 9/11/2018 Little interest or pleasure in doing things Not at all Feeling down, depressed, irritable, or hopeless Not at all Total Score PHQ 2 0 Learning Assessment: 
Learning Assessment 2/8/2018 PRIMARY LEARNER Patient HIGHEST LEVEL OF EDUCATION - PRIMARY LEARNER  GRADUATED HIGH SCHOOL OR GED  
BARRIERS PRIMARY LEARNER NONE  
CO-LEARNER CAREGIVER No  
PRIMARY LANGUAGE ENGLISH  NEED No  
LEARNER PREFERENCE PRIMARY DEMONSTRATION  
LEARNING SPECIAL TOPICS no  
ANSWERED BY self RELATIONSHIP SELF  
ASSESSMENT COMMENT no  
 
 
Abuse Screening: 
Abuse Screening Questionnaire 9/14/2017 Do you ever feel afraid of your partner? Laura Guzman Are you in a relationship with someone who physically or mentally threatens you? Laura Guzman Is it safe for you to go home? Purnima Roque Fall Risk Fall Risk Assessment, last 12 mths 9/11/2018 Able to walk? Yes Fall in past 12 months? No  
 
 
Health Maintenance reviewed and discussed and ordered per Provider. Health Maintenance Due Topic Date Due  
 FOBT Q 1 YEAR AGE 50-75  10/01/1993  ZOSTER VACCINE AGE 60>  08/01/2003  GLAUCOMA SCREENING Q2Y  10/01/2008  DTaP/Tdap/Td series (1 - Tdap) 10/01/2013  Influenza Age 5 to Adult  08/01/2018 Irasema Storey is updated on all  Pt currently taking Antiplatelet therapy? no 
 
Coordination of Care: 1. Have you been to the ER, urgent care clinic since your last visit? no Hospitalized since your last visit? no 
 
2.  Have you seen or consulted any other health care providers outside of the [address] [place] [address] since your last visit? no Include any pap smears or colon screening.  no

## 2018-09-13 NOTE — PROGRESS NOTES
The patient presents to the office today with the chief complaint of hypertension HPI The patient remains on medications for hypertension. she is tolerating the medications well. The patient has no complaints. Review of Systems Respiratory: Negative for shortness of breath. Cardiovascular: Negative for chest pain and leg swelling. No Known Allergies Current Outpatient Prescriptions Medication Sig Dispense Refill  propranolol (INDERAL) 20 mg tablet TAKE 1 TABLET BY MOUTH TWICE DAILY 180 Tab 0  
 hydroCHLOROthiazide (HYDRODIURIL) 25 mg tablet TAKE 1 TABLET BY MOUTH ONCE DAILY IN THE MORNING 90 Tab 0  
 fluticasone (FLONASE) 50 mcg/actuation nasal spray 2 spray each nostril daily 1 Bottle 1  VIT A/VIT C/VIT E/ZINC/COPPER (OCUVITE PRESERVISION PO) Take 1 Tab by mouth daily.  cetirizine (ZYRTEC) 10 mg tablet Take 10 mg by mouth daily.  GLUCOSAMINE/CHONDR CROWE A SOD (OSTEO BI-FLEX PO) Take 2 Tabs by mouth daily.  Omega-3-DHA-EPA-Fish Oil (FISH OIL) 1,000 mg (120 mg-180 mg) cap Take 2 Caps by mouth daily.  naproxen sodium (ALEVE) 220 mg tablet Take 440 mg by mouth daily as needed for Pain.  oseltamivir (TAMIFLU) 75 mg capsule 1 capsule twice per day 10 Cap 0  
 cefUROXime (CEFTIN) 500 mg tablet 1 tablet twice per day 14 Tab 0  
 guaiFENesin-codeine (ROBITUSSIN AC) 100-10 mg/5 mL solution Take 5 mL by mouth three (3) times daily as needed for Cough. Max Daily Amount: 15 mL. Do not drive if taking this medication 118 mL 0 Past Medical History:  
Diagnosis Date  Benign essential hypertension  Ill-defined condition   
 sciatica  Macular degeneration   
 followed by Dr. David Gonzalez  Osteoarthrosis involving multiple sites  Restless legs syndrome Past Surgical History:  
Procedure Laterality Date  HX BREAST BIOPSY    
 benign  HX  SECTION    
 HX HYSTERECTOMY  HX OOPHORECTOMY Right Social History Social History  Marital status:  Spouse name: N/A  
 Number of children: N/A  
 Years of education: N/A Occupational History  Not on file. Social History Main Topics  Smoking status: Never Smoker  Smokeless tobacco: Never Used  Alcohol use No  
 Drug use: No  
 Sexual activity: Yes  
  Partners: Male Other Topics Concern  Not on file Social History Narrative Patient does not have an advanced directive on file Visit Vitals  /80 (BP 1 Location: Left arm, BP Patient Position: Sitting)  Pulse (!) 51  Temp 97.8 °F (36.6 °C) (Tympanic)  Ht 5' 7\" (1.702 m)  Wt 238 lb (108 kg)  SpO2 97%  BMI 37.28 kg/m2 Physical Exam  
Neck: Carotid bruit is not present. Cardiovascular: Normal rate and regular rhythm. Exam reveals no gallop. No murmur heard. Pulmonary/Chest: She has no wheezes. Abdominal: Soft. She exhibits no distension. There is no tenderness. Musculoskeletal: She exhibits no edema. BMI:  The patient was advised to limit calories to 2000 mady and carbohydrates to 100 grams daily No visits with results within 3 Month(s) from this visit. Latest known visit with results is: 
 
Hospital Outpatient Visit on 01/26/2018 Component Date Value Ref Range Status  WBC 01/26/2018 7.5  4.6 - 13.2 K/uL Final  
 RBC 01/26/2018 4.89  4.20 - 5.30 M/uL Final  
 HGB 01/26/2018 14.8  12.0 - 16.0 g/dL Final  
 HCT 01/26/2018 46.2* 35.0 - 45.0 % Final  
 MCV 01/26/2018 94.5  74.0 - 97.0 FL Final  
 MCH 01/26/2018 30.3  24.0 - 34.0 PG Final  
 MCHC 01/26/2018 32.0  31.0 - 37.0 g/dL Final  
 RDW 01/26/2018 13.8  11.6 - 14.5 % Final  
 PLATELET 93/46/7821 591  135 - 420 K/uL Final  
 MPV 01/26/2018 11.7  9.2 - 11.8 FL Final  
 NEUTROPHILS 01/26/2018 62  40 - 73 % Final  
 LYMPHOCYTES 01/26/2018 26  21 - 52 % Final  
 MONOCYTES 01/26/2018 7  3 - 10 % Final  
 EOSINOPHILS 01/26/2018 4  0 - 5 % Final  
  BASOPHILS 01/26/2018 1  0 - 2 % Final  
 ABS. NEUTROPHILS 01/26/2018 4.8  1.8 - 8.0 K/UL Final  
 ABS. LYMPHOCYTES 01/26/2018 1.9  0.9 - 3.6 K/UL Final  
 ABS. MONOCYTES 01/26/2018 0.5  0.05 - 1.2 K/UL Final  
 ABS. EOSINOPHILS 01/26/2018 0.3  0.0 - 0.4 K/UL Final  
 ABS. BASOPHILS 01/26/2018 0.0  0.0 - 0.06 K/UL Final  
 DF 01/26/2018 AUTOMATED    Final  
 Sodium 01/26/2018 146* 136 - 145 mmol/L Final  
 Potassium 01/26/2018 4.6  3.5 - 5.5 mmol/L Final  
 Chloride 01/26/2018 106  100 - 108 mmol/L Final  
 CO2 01/26/2018 31  21 - 32 mmol/L Final  
 Anion gap 01/26/2018 9  3.0 - 18 mmol/L Final  
 Glucose 01/26/2018 101* 74 - 99 mg/dL Final  
 BUN 01/26/2018 17  7.0 - 18 MG/DL Final  
 Creatinine 01/26/2018 0.75  0.6 - 1.3 MG/DL Final  
 BUN/Creatinine ratio 01/26/2018 23* 12 - 20   Final  
 GFR est AA 01/26/2018 >60  >60 ml/min/1.73m2 Final  
 GFR est non-AA 01/26/2018 >60  >60 ml/min/1.73m2 Final  
 Comment: (NOTE) Estimated GFR is calculated using the Modification of Diet in Renal  
Disease (MDRD) Study equation, reported for both  Americans [facility]) and non- Americans (GFRNA), and normalized to 1.73m2  
body surface area. The physician must decide which value applies to  
the patient. The MDRD study equation should only be used in  
individuals age 25 or older. It has not been validated for the  
following: pregnant women, patients with serious comorbid conditions,  
or on certain medications, or persons with extremes of body size,  
muscle mass, or nutritional status.  Calcium 01/26/2018 9.1  8.5 - 10.1 MG/DL Final  
 Bilirubin, total 01/26/2018 0.7  0.2 - 1.0 MG/DL Final  
 ALT (SGPT) 01/26/2018 20  13 - 56 U/L Final  
 AST (SGOT) 01/26/2018 11* 15 - 37 U/L Final  
 Alk.  phosphatase 01/26/2018 78  45 - 117 U/L Final  
 Protein, total 01/26/2018 7.0  6.4 - 8.2 g/dL Final  
 Albumin 01/26/2018 3.8  3.4 - 5.0 g/dL Final  
  Globulin 01/26/2018 3.2  2.0 - 4.0 g/dL Final  
 A-G Ratio 01/26/2018 1.2  0.8 - 1.7   Final  
 LIPID PROFILE 01/26/2018        Final  
 Cholesterol, total 01/26/2018 180  <200 MG/DL Final  
 Triglyceride 01/26/2018 113  <150 MG/DL Final  
 Comment: The drugs N-acetylcysteine (NAC) and 
Metamiszole have been found to cause falsely 
low results in this chemical assay. Please 
be sure to submit blood samples obtained BEFORE administration of either of these 
drugs to assure correct results.  HDL Cholesterol 01/26/2018 48  40 - 60 MG/DL Final  
 LDL, calculated 01/26/2018 109.4* 0 - 100 MG/DL Final  
 VLDL, calculated 01/26/2018 22.6  MG/DL Final  
 CHOL/HDL Ratio 01/26/2018 3.8  0 - 5.0   Final  
 
 
.No results found for any visits on 09/11/18. Assessment / Plan ICD-10-CM ICD-9-CM 1. Benign essential hypertension Y25 199.5 METABOLIC PANEL, BASIC 2. Encounter for immunization Z23 V03.89 INFLUENZA VACCINE INACTIVATED (IIV), SUBUNIT, ADJUVANTED, IM  
   ADMIN PNEUMOCOCCAL VACCINE Labs ordered Flu shot given 
she was advised to continue her maintenance medications Follow-up Disposition: 
Return in about 6 months (around 3/11/2019). I asked Pérez Caceres if she has any questions and I answered the questions. Dottie Caceres states that she understands the treatment plan and agrees with the treatment plan

## 2018-11-08 RX ORDER — HYDROCHLOROTHIAZIDE 25 MG/1
TABLET ORAL
Qty: 90 TAB | Refills: 0 | Status: SHIPPED | OUTPATIENT
Start: 2018-11-08 | End: 2019-02-18 | Stop reason: SDUPTHER

## 2018-12-11 ENCOUNTER — TELEPHONE (OUTPATIENT)
Dept: INTERNAL MEDICINE CLINIC | Age: 73
End: 2018-12-11

## 2018-12-11 RX ORDER — PROPRANOLOL HYDROCHLORIDE 20 MG/1
TABLET ORAL
Qty: 180 TAB | Refills: 3 | Status: SHIPPED | OUTPATIENT
Start: 2018-12-11 | End: 2020-02-06

## 2018-12-11 RX ORDER — CEPHALEXIN 500 MG/1
CAPSULE ORAL
Qty: 25 CAP | Refills: 0 | Status: SHIPPED | OUTPATIENT
Start: 2018-12-11 | End: 2019-03-07 | Stop reason: ALTCHOICE

## 2018-12-12 NOTE — TELEPHONE ENCOUNTER
Patient's  recovering form a respiratory infection. Patient now with a sore throat.   Will begin Keflex

## 2019-02-18 RX ORDER — HYDROCHLOROTHIAZIDE 25 MG/1
TABLET ORAL
Qty: 90 TAB | Refills: 0 | Status: SHIPPED | OUTPATIENT
Start: 2019-02-18 | End: 2019-06-07 | Stop reason: SDUPTHER

## 2019-03-06 NOTE — PROGRESS NOTES
Dr. Francesca Barreto  referred Maribell Fregoso (1945) a 74 y.o. female for a Lakesha Visit (Codie Altman). This is a Subsequent Medicare Annual Wellness Visit providing Personalized Prevention Plan Services (PPPS) (Performed 12 months after initial AWV and PPPS). I have reviewed the patient's medical history in detail and updated the computerized patient record. History     Past Medical History:   Diagnosis Date    Benign essential hypertension     Ill-defined condition     sciatica    Macular degeneration     followed by Dr. Gianni Self involving multiple sites     Restless legs syndrome       Past Surgical History:   Procedure Laterality Date    HX BREAST BIOPSY      benign    HX  SECTION      HX HYSTERECTOMY      HX OOPHORECTOMY Right      Current Outpatient Medications   Medication Sig Dispense Refill    docusate sodium (COLACE) 100 mg capsule Take 200 mg by mouth daily.  hydroCHLOROthiazide (HYDRODIURIL) 25 mg tablet TAKE 1 TABLET BY MOUTH ONCE DAILY IN THE MORNING 90 Tab 0    propranolol (INDERAL) 20 mg tablet TAKE 1 TABLET BY MOUTH TWICE DAILY 180 Tab 3    VIT A/VIT C/VIT E/ZINC/COPPER (OCUVITE PRESERVISION PO) Take 1 Tab by mouth daily.  cetirizine (ZYRTEC) 10 mg tablet Take 10 mg by mouth daily.  GLUCOSAMINE/CHONDR CROWE A SOD (OSTEO BI-FLEX PO) Take 2 Tabs by mouth daily.  naproxen sodium (ALEVE) 220 mg tablet Take 440 mg by mouth daily as needed for Pain.        No Known Allergies  Family History   Problem Relation Age of Onset    Heart Disease Mother     Diabetes Mother         in later life   [facility] Heart Disease Father     Hypertension Brother     Diabetes Maternal Aunt     Cancer Paternal Aunt         lung    Cancer Paternal Grandmother         breast and colon    Stroke Maternal Grandmother     Stroke Maternal Grandfather      Social History     Tobacco Use    Smoking status: Never Smoker    Smokeless tobacco: Never Used   Substance Use Topics    Alcohol use: No     Patient Active Problem List   Diagnosis Code    Benign essential hypertension I10    Osteoarthrosis involving multiple sites M15.9    Restless legs syndrome G25.81    Advance directive discussed with patient Z71.89       Depression Risk Factor Screening:     3 most recent PHQ Screens 3/7/2019   Little interest or pleasure in doing things Not at all   Feeling down, depressed, irritable, or hopeless Not at all   Total Score PHQ 2 0       Alcohol Risk Factor Screening: You do not drink alcohol or very rarely. Functional Ability and Level of Safety:     Hearing Loss   The patient wears hearing aids. Activities of Daily Living   The home contains: no safety equipment  Patient does total self care    ADL Assessment 3/7/2019   Feeding yourself No Help Needed   Getting from bed to chair No Help Needed   Getting dressed No Help Needed   Bathing or showering No Help Needed   Walk across the room (includes cane/walker) No Help Needed   Using the telphone No Help Needed   Taking your medications No Help Needed   Preparing meals No Help Needed   Managing money (expenses/bills) No Help Needed   Moderately strenuous housework (laundry) No Help Needed   Shopping for personal items (toiletries/medicines) No Help Needed   Shopping for groceries No Help Needed   Driving No Help Needed   Climbing a flight of stairs Help Needed   Getting to places beyond walking distances No Help Needed       Fall Risk     Fall Risk Assessment, last 12 mths 3/7/2019   Able to walk? Yes   Fall in past 12 months? No       Abuse Screen   Patient is not abused    Abuse Screening Questionnaire 3/7/2019   Do you ever feel afraid of your partner? N   Are you in a relationship with someone who physically or mentally threatens you? N   Is it safe for you to go home?  Y         Cognitive Screening   Evaluation of Cognitive Function:  Has your family/caregiver stated any concerns about your memory: no  Normal, Mini Cog test  Family member/caregiver input:  present but did not provide any medical information or participate in the visit    Physical Examination     No exam data present  Visit Vitals  /78 (BP 1 Location: Right arm, BP Patient Position: Sitting)   Pulse 65   Temp 98.2 °F (36.8 °C) (Tympanic)   Ht 5' 7\" (1.702 m)   Wt 242 lb (109.8 kg)   SpO2 96%   BMI 37.90 kg/m²       No exam performed by pharmacist today, not required for Medicare Annual Wellness Visit. Patient to be seen by Dr. Neema Knight separately. Patient Care Team     Patient Care Team:  Neema Knight MD as PCP - General (Internal Medicine)  Mike Urbina MD as Physician (Ophthalmology)  Kelly Willingham DPM as Physician (Podiatry)  Ramses Medeiros MD as Physician (Neurosurgery)    Assessment/Plan     Education and counseling provided:  Are appropriate based on today's review and evaluation  End-of-Life planning (with patient's consent)  Screening Mammography  Colorectal cancer screening tests  Cardiovascular screening blood test  Screening for glaucoma  Diabetes screening test  Tdap / Zoster vaccination recommendations    Diagnoses and all orders for this visit:    1. Medicare [facility] wellness [facility]    2. Screening for depression  -     DEPRESSION SCREEN ANNUAL         3. Medication Reconciliation: Performed today. Patient did not bring her medications to the visit.   During the patient interview, the following changes were made:    Discontinued: Ceftin, Keflex, Flonase, Robitussin AC, Tamiflu (all therapy completed or not current) and fish oil   Additions: Colace   Changes / other discrepancies: none    Medications Discontinued During This Encounter   Medication Reason    cefUROXime (CEFTIN) 500 mg tablet Therapy Completed    cephALEXin (KEFLEX) 500 mg capsule Therapy Completed    fluticasone (FLONASE) 50 mcg/actuation nasal spray Not A Current Medication    guaiFENesin-codeine (ROBITUSSIN AC) 100-10 mg/5 mL solution Not A Current Medication    oseltamivir (TAMIFLU) 75 mg capsule Therapy Completed    Omega-3-DHA-EPA-Fish Oil (FISH OIL) 1,000 mg (120 mg-180 mg) cap Not A Current Medication       4. Screenings and Immunizations (see patient instructions for chart/information):  Mammogram: Up to date 9/27/17, due for repeat per PCP  Pap: not indicated  DEXA scan: Up to date 9/27/2017 BMD WNL, baseline screening complete   Colonoscopy/Colon Cancer Screening: ? 5-10 years ago per patient (family history of colon cancer), advised to discuss with PCP as screening likely indicated one last time  Glaucoma screening/Eye exam: Up to date late 2018 per patient, due for repeat every 6 months per Dr. Demetria Powell (per patient, following macular degeneration)  Lipid panel: Up to date 1/26/18, due for repeat per PCP (not on therapy)   Diabetes: Up to date 1/26/2018, due for repeat with routine fasting labs     Immunizations: Patient confirmed the following records of vaccinations are correct and current. Immunization History   Administered Date(s) Administered    Influenza High Dose Vaccine PF 10/03/2016, 09/14/2017    Influenza Vaccine (Quad) PF 09/25/2015    Influenza Vaccine (Tri) Adjuvanted 09/11/2018    Pneumococcal Conjugate (PCV-13) 09/25/2015    Pneumococcal Polysaccharide (PPSV-23) 10/03/2016    Pneumococcal Vaccine (Unspecified Type) 11/02/2004    Tdap 09/30/2013       Pneumococcal:  Series completed. Influenza:  Already given this flu season, due again next Fall. Zoster:  Discussed new recommendations for Shingrix vaccination/revaccination. Recommended that patient receive at her pharmacy and have pharmacy records faxed to the office. Tdap:  Tdap given in 2013, Td due in 2023.    5. Advanced Care Planning: Patient educated on the importance of an advanced care plan (she has paperwork at home from last AWV and is considering completing).  Asked patient to read over the information and bring back the completed form to her next appointment so it can be scanned into the chart. Patient verbalized understanding of information presented. Answered all of the patient's questions. AVS information was reviewed with patient and will be printed on checkout.     Giovana Edmonds, PharmD, BCACP    Health Maintenance Due   Topic Date Due    Shingrix Vaccine Age 50> (1 of 2) 10/01/1993    GLAUCOMA SCREENING Q2Y  10/01/2008

## 2019-03-07 ENCOUNTER — OFFICE VISIT (OUTPATIENT)
Dept: INTERNAL MEDICINE CLINIC | Age: 74
End: 2019-03-07

## 2019-03-07 VITALS
SYSTOLIC BLOOD PRESSURE: 160 MMHG | BODY MASS INDEX: 37.98 KG/M2 | WEIGHT: 242 LBS | OXYGEN SATURATION: 96 % | TEMPERATURE: 98.2 F | DIASTOLIC BLOOD PRESSURE: 78 MMHG | HEART RATE: 65 BPM | HEIGHT: 67 IN

## 2019-03-07 DIAGNOSIS — I10 BENIGN ESSENTIAL HYPERTENSION: ICD-10-CM

## 2019-03-07 DIAGNOSIS — Z00.00 MEDICARE ANNUAL WELLNESS VISIT, SUBSEQUENT: Primary | ICD-10-CM

## 2019-03-07 DIAGNOSIS — J06.9 ACUTE URI: ICD-10-CM

## 2019-03-07 DIAGNOSIS — Z13.31 SCREENING FOR DEPRESSION: ICD-10-CM

## 2019-03-07 DIAGNOSIS — Z11.59 ENCOUNTER FOR HEPATITIS C SCREENING TEST FOR LOW RISK PATIENT: ICD-10-CM

## 2019-03-07 RX ORDER — LABETALOL 100 MG/1
TABLET, FILM COATED ORAL
Qty: 60 TAB | Refills: 3 | Status: SHIPPED | OUTPATIENT
Start: 2019-03-07 | End: 2019-07-12 | Stop reason: SDUPTHER

## 2019-03-07 RX ORDER — DOCUSATE SODIUM 100 MG/1
100 CAPSULE, LIQUID FILLED ORAL DAILY
COMMUNITY

## 2019-03-07 RX ORDER — AZITHROMYCIN 250 MG/1
TABLET, FILM COATED ORAL
Qty: 6 TAB | Refills: 0 | Status: SHIPPED | OUTPATIENT
Start: 2019-03-07 | End: 2019-03-12

## 2019-03-07 NOTE — ACP (ADVANCE CARE PLANNING)
Non-Provider Advance Care Planning (ACP) Note    Date of ACP Conversation: 3/7/2019  Persons included in Conversation: patient and family ( present but had difficulty participating)  Length of ACP Conversation in minutes: <16 minutes (Non-Billable)    Conversation requested by: Other: included in Medicare [address] (if patient is incapable of making informed decisions): This person is:   Other Legally Authorized Decision Maker (e.g. Next of Kin)        General ACP for ALL Patients with Decision Making Capacity:    Advance Directive Conversation with Patients who have not yet planned:  Importance of advance care planning, including choosing a healthcare agent to communicate patient's healthcare decisions if patient lost the ability to make decisions, such as after a sudden illness or accident  Reviewed healthcare agent role and authority/lezama  Recommended additional conversation with prospective agent    Interventions Provided:  Recommended communicating the plan and providing copies for the healthcare agent, personal physician, and others as appropriate   Recommended review of completed ACP document annually or upon change in health status  Patient has ACP documents at home

## 2019-03-07 NOTE — PROGRESS NOTES
Melanie Cardona presents today for   Chief Complaint   Patient presents with    Well Woman    Cold Symptoms       Melanie Cardona preferred language for health care discussion is english. Is someone accompanying this pt? Yes     Is the patient using any DME equipment during [address]? no    Depression Screening:  3 most recent PHQ Screens 3/7/2019   Little interest or pleasure in doing things Not at all   Feeling down, depressed, irritable, or hopeless -   Total Score PHQ 2 -       Learning Assessment:  Learning Assessment 2/8/2018   PRIMARY LEARNER Patient   HIGHEST LEVEL OF EDUCATION - PRIMARY LEARNER  GRADUATED HIGH SCHOOL OR GED   BARRIERS PRIMARY LEARNER NONE   CO-LEARNER CAREGIVER No   PRIMARY LANGUAGE ENGLISH    NEED No   LEARNER PREFERENCE PRIMARY DEMONSTRATION   LEARNING SPECIAL TOPICS no   ANSWERED BY self   RELATIONSHIP SELF   ASSESSMENT COMMENT no       Abuse Screening:  Abuse Screening Questionnaire 9/14/2017   Do you ever feel afraid of your partner? N   Are you in a relationship with someone who physically or mentally threatens you? N   Is it safe for you to go home? Y       Fall Risk  Fall Risk Assessment, last 12 mths 3/7/2019   Able to walk? Yes   Fall in past 12 months? No       Health Maintenance reviewed and discussed and ordered per Provider. Health Maintenance Due   Topic Date Due    Shingrix Vaccine Age 49> (1 of 2) 10/01/1993    GLAUCOMA SCREENING Q2Y  10/01/2008    MEDICARE YEARLY EXAM  09/15/2018   . Melanie Cardona is updated on all     Pt currently taking Antiplatelet therapy? no    Coordination of Care:  1. Have you been to the ER, urgent care clinic since your last visit? no Hospitalized since your last visit? no    2. Have you seen or consulted any other health care providers outside of the [address] [place] [address] since your last visit? no Include any pap smears or colon screening.  no

## 2019-03-07 NOTE — PATIENT INSTRUCTIONS
Medicare Wellness Visit, Female    The best way to improve and maintain good health is to have a healthy lifestyle by eating a well-balanced diet, exercising regularly, limiting alcohol and stopping smoking. Regular physical exams and screening tests are another way to keep healthy. Preventive exams provided by your health care provider can find health problems before they become diseases or illnesses. Preventive services including immunizations, screening tests, monitoring and exams can help you take care of your own health. Preventive services such as immunizations prevent serious infections. All people over age 72 should have a Pneumovax and a Prevnar-13 shot to prevent potentially life threatening infections with the pneumococcus bacteria, a common cause of pneumonia. These are once in a lifetime unless you and your provider decide differently. Next due: series completed    All people over 65 should have a yearly influenza vaccine or \"flu\" shot. This does not prevent infection with cold viruses but has been proven to prevent hospitalization and death from influenza. Next due: next fall    Although Medicare part B \"regular Medicare\" currently only covers tetanus vaccination in the context of an injury, a tetanus vaccine (Tdap or Td) is recommended every 10 years. Tdap is generally given once in a lifetime for older adults. Next due: Td in 2023    A shingles vaccine is recommended as you get older. Zostavax is a once in a lifetime vaccine given over age 61years of age. There is also a new shingles vaccine, Shingrix, that is now preferred over Zostavax. Shingrix (a 2-dose series) is recommended if you are over age 48years of age and you've never received a shingles vaccine. It is also recommended for revaccination if you've previously received Zostavax. The Shingles vaccines are not covered by Medicare part B.  Next due: Shingrix (please check with your pharmacy on cost/availabilty)    Note, however, that both the Shingles vaccine and Tdap/Td are generally covered by secondary carriers. Please check your coverage and out of pocket expenses. Your pharmacy benefits may cover these vaccines so please check with your pharmacist.  Also consider contacting your local health department because it may stock these vaccines for a reasonable charge. We currently have documentation of the following immunization history for you:  Immunization History   Administered Date(s) Administered    Influenza High Dose Vaccine PF 10/03/2016, 09/14/2017    Influenza Vaccine (Quad) PF 09/25/2015    Influenza Vaccine (Tri) Adjuvanted 09/11/2018    Pneumococcal Conjugate (PCV-13) 09/25/2015    Pneumococcal Polysaccharide (PPSV-23) 10/03/2016    Pneumococcal Vaccine (Unspecified Type) 11/02/2004    Tdap 09/30/2013       A bone mass density test (DEXA) to screen for osteoporosis or thinning of the bones should be done at least once after age 72 and may be done up to every 2 years as determined by you and your health care provider. The most recent DEXA we have on file for you is:  DEXA Results (most recent):  9/27/2017 normal bone density  Next due: baseline screening complete    Screening for diabetes mellitus with a blood sugar test (glucose) should be done every year. If you have diabetes, this monitoring will be done more frequently (usually every 3-6 months) and will include A1C testing. The most recent blood glucose we have on file for you is:   Lab Results   Component Value Date/Time    Glucose 101 (H) 01/26/2018 11:45 AM     No results found for: HBA1C, HGBE8, [id number], [id number]    Glaucoma is a disease of the eye due to increased ocular pressure that can lead to blindness.  People with risk factors for glaucoma ( race,  American race, diabetes, family history) should be screened every year by an eye professional.   Last done: 2018  Next due: every 6 months per Dr. Justin Ortega    Cardiovascular screening tests that check for elevated lipids or cholesterol (fatty part of blood) which can lead to heart disease and strokes should be done every 5 years. The most recent lipid panel we have on file for you is:   Lab Results   Component Value Date/Time    Cholesterol, total 180 01/26/2018 11:45 AM    HDL Cholesterol 48 01/26/2018 11:45 AM    LDL, calculated 109.4 (H) 01/26/2018 11:45 AM    VLDL, calculated 22.6 01/26/2018 11:45 AM    Triglyceride 113 01/26/2018 11:45 AM    CHOL/HDL Ratio 3.8 01/26/2018 11:45 AM     Next due: per Dr. Shin Chanel    Colorectal cancer screening that evaluates for blood or polyps in your colon for people with average risk should be done yearly as a stool test, every five years as a flexible sigmoidoscope or every 10 years as a colonoscopy up to age 76. You and your health care provider may decide whether to continue screening after age 76 or if you need to be screened more frequently. Last done:  ? 5-10 years ago  Next due: please discuss with Dr. Shin Chanel to see if screening is appropriate    Breast cancer screening with a mammogram is recommended at least once every 2 years  for women age 54-69. You and your health care provider may decide whether to continue screening after age 76. The most recent mammogram we have on file for you is:   SUMMER Results (most recent): 9/27/2017  Next due: every 1-2 years or as advised    Screening for cervical cancer with a pap smear and pelvic exam is recommended for all women with a cervix until age 72. The frequency of this test is based on the details of your prior pap smear testing (usually every 1 or 2 years - more often with increased risk of cervical cancer or positive family history). You and your health care provider may decide whether to continue screening after age 72. Next due: not indicated    Screening for infection with Hepatitis C is recommended for anyone born between [birth year] through [address].     People ages 54 to [de-identified] years who have smoked the equivalent of 1 pack per day for 30 years or more may benefit from screening for lung cancer with a yearly low dose CT scan until they have been non smokers for 15 years. Please ask your health care provider if you have any questions. Your Medicare Wellness Exam is recommended annually. If you do not have Advanced Directives on file with our office and you either have the completed document at home or you fill out the forms provided, please bring a copy to the office to be scanned into your record.     Here is a list of your current Health Maintenance items with a due date:  Health Maintenance Due   Topic Date Due    Shingles Vaccine (1 of 2) 10/01/1993    Glaucoma Screening   10/01/2008    Annual Well Visit  09/15/2018

## 2019-03-08 ENCOUNTER — HOSPITAL ENCOUNTER (OUTPATIENT)
Dept: LAB | Age: 74
Discharge: HOME | End: 2019-03-08
Payer: MEDICARE

## 2019-03-08 DIAGNOSIS — Z11.59 ENCOUNTER FOR HEPATITIS C SCREENING TEST FOR LOW RISK PATIENT: ICD-10-CM

## 2019-03-08 DIAGNOSIS — I10 BENIGN ESSENTIAL HYPERTENSION: ICD-10-CM

## 2019-03-08 LAB
ALBUMIN SERPL-MCNC: 3.8 G/DL (ref 3.4–5)
ALBUMIN/GLOB SERPL: 1.3 {RATIO} (ref 0.8–1.7)
ALP SERPL-CCNC: 88 U/L (ref 45–117)
ALT SERPL-CCNC: 24 U/L (ref 13–56)
ANION GAP SERPL CALC-SCNC: 6 MMOL/L (ref 3–18)
AST SERPL-CCNC: 14 U/L (ref 15–37)
BASOPHILS # BLD: 0 K/UL (ref 0–0.1)
BASOPHILS NFR BLD: 1 % (ref 0–2)
BILIRUB SERPL-MCNC: 0.5 MG/DL (ref 0.2–1)
BUN SERPL-MCNC: 17 MG/DL (ref 7–18)
BUN/CREAT SERPL: 22 (ref 12–20)
CALCIUM SERPL-MCNC: 8.7 MG/DL (ref 8.5–10.1)
CHLORIDE SERPL-SCNC: 102 MMOL/L (ref 100–108)
CO2 SERPL-SCNC: 31 MMOL/L (ref 21–32)
CREAT SERPL-MCNC: 0.78 MG/DL (ref 0.6–1.3)
DIFFERENTIAL METHOD BLD: ABNORMAL
EOSINOPHIL # BLD: 0.9 K/UL (ref 0–0.4)
EOSINOPHIL NFR BLD: 10 % (ref 0–5)
ERYTHROCYTE [DISTWIDTH] IN BLOOD BY AUTOMATED COUNT: 13.9 % (ref 11.6–14.5)
GLOBULIN SER CALC-MCNC: 3 G/DL (ref 2–4)
GLUCOSE SERPL-MCNC: 157 MG/DL (ref 74–99)
HCT VFR BLD AUTO: 42.1 % (ref 35–45)
HCV AB SER IA-ACNC: 0.04 INDEX
HCV AB SERPL QL IA: NEGATIVE
HCV COMMENT,HCGAC: NORMAL
HGB BLD-MCNC: 13.9 G/DL (ref 12–16)
LYMPHOCYTES # BLD: 1.1 K/UL (ref 0.9–3.6)
LYMPHOCYTES NFR BLD: 13 % (ref 21–52)
MCH RBC QN AUTO: 29.7 PG (ref 24–34)
MCHC RBC AUTO-ENTMCNC: 33 G/DL (ref 31–37)
MCV RBC AUTO: 90 FL (ref 74–97)
MONOCYTES # BLD: 0.6 K/UL (ref 0.05–1.2)
MONOCYTES NFR BLD: 7 % (ref 3–10)
NEUTS SEG # BLD: 5.8 K/UL (ref 1.8–8)
NEUTS SEG NFR BLD: 69 % (ref 40–73)
PLATELET # BLD AUTO: 211 K/UL (ref 135–420)
PMV BLD AUTO: 11 FL (ref 9.2–11.8)
POTASSIUM SERPL-SCNC: 3.7 MMOL/L (ref 3.5–5.5)
PROT SERPL-MCNC: 6.8 G/DL (ref 6.4–8.2)
RBC # BLD AUTO: 4.68 M/UL (ref 4.2–5.3)
SODIUM SERPL-SCNC: 139 MMOL/L (ref 136–145)
WBC # BLD AUTO: 8.4 K/UL (ref 4.6–13.2)

## 2019-03-08 PROCEDURE — 86803 HEPATITIS C AB TEST: CPT

## 2019-03-08 PROCEDURE — 85025 COMPLETE CBC W/AUTO DIFF WBC: CPT

## 2019-03-08 PROCEDURE — 36415 COLL VENOUS BLD VENIPUNCTURE: CPT

## 2019-03-08 PROCEDURE — 80053 COMPREHEN METABOLIC PANEL: CPT

## 2019-03-10 PROBLEM — E66.01 SEVERE OBESITY (HCC): Status: ACTIVE | Noted: 2019-03-10

## 2019-03-11 NOTE — PROGRESS NOTES
The patient presents to the office today with the chief complaint of sinus congestion    HPI    The patient remains on Labetalol for hypertension. she is tolerating the medications well. The patient has complaints of 3 days of sinus congeston and drainage. She denies fever. The patient is due for screening labs for hepatitis C      Review of Systems   HENT: Positive for congestion. Respiratory: Negative for shortness of breath. Cardiovascular: Negative for chest pain and leg swelling. No Known Allergies    Current Outpatient Medications   Medication Sig Dispense Refill    docusate sodium (COLACE) 100 mg capsule Take 200 mg by mouth daily.  labetalol (NORMODYNE) 100 mg tablet 1 tab twice per day (Stop Propranolol) 60 Tab 3    azithromycin (ZITHROMAX) 250 mg tablet Take 2 tablets today, then take 1 tablet daily 6 Tab 0    hydroCHLOROthiazide (HYDRODIURIL) 25 mg tablet TAKE 1 TABLET BY MOUTH ONCE DAILY IN THE MORNING 90 Tab 0    propranolol (INDERAL) 20 mg tablet TAKE 1 TABLET BY MOUTH TWICE DAILY 180 Tab 3    VIT A/VIT C/VIT E/ZINC/COPPER (OCUVITE PRESERVISION PO) Take 1 Tab by mouth daily.  cetirizine (ZYRTEC) 10 mg tablet Take 10 mg by mouth daily.  GLUCOSAMINE/CHONDR CROWE A SOD (OSTEO BI-FLEX PO) Take 2 Tabs by mouth daily.  naproxen sodium (ALEVE) 220 mg tablet Take 440 mg by mouth daily as needed for Pain.          Past Medical History:   Diagnosis Date    Benign essential hypertension     Ill-defined condition     sciatica    Macular degeneration     followed by Dr. Duran Wells involving multiple sites     Restless legs syndrome        Past Surgical History:   Procedure Laterality Date    HX BREAST BIOPSY      benign    HX  SECTION      HX HYSTERECTOMY      HX OOPHORECTOMY Right        Social History     Socioeconomic History    Marital status:      Spouse name: Not on file    Number of children: Not on file    Years of education: Not on file    Highest education level: Not on file   Social Needs    Financial resource strain: Not on file    Food insecurity - worry: Not on file    Food insecurity - inability: Not on file    Transportation needs - medical: Not on file   [employer] (MM) needs - non-medical: Not on file   Occupational History    Not on file   Tobacco Use    Smoking status: Never Smoker    Smokeless tobacco: Never Used   Substance and Sexual Activity    Alcohol use: No    Drug use: No    Sexual activity: Yes     Partners: Male   Other Topics Concern    Not on file   Social History Narrative    Not on file       Patient does not have an advanced directive on file    Visit Vitals  /78 (BP 1 Location: Right arm, BP Patient Position: Sitting)   Pulse 65   Temp 98.2 °F (36.8 °C) (Tympanic)   Ht 5' 7\" (1.702 m)   Wt 242 lb (109.8 kg)   SpO2 96%   BMI 37.90 kg/m²       Physical Exam   Ears:  Normal  Oral  Pharynx:  Evidence for drainage  No Cervical Lymphadenopathy  No Supraclavicular Lymphadenopathy  Thyroid is Normal  Lungs are normal to percussion. Clear to auscultation   Heart:  S1 S2 are normal, No gallops, No murmurs  No Carotid Bruits  Abdomen:  Normal Bowel Sounds. No tenderness. No masses. No Hepatomegaly or Splenomegaly  LE:  Strong Pedal Pulses. No Edema    BMI:  The patient was advised to limit calories to 1800 mady and carbohydrates to 100 grams daily      No visits with results within 3 Month(s) from this visit.    Latest known visit with results is:   Hospital Outpatient Visit on 01/26/2018   Component Date Value Ref Range Status    WBC 01/26/2018 7.5  4.6 - 13.2 K/uL Final    RBC 01/26/2018 4.89  4.20 - 5.30 M/uL Final    HGB 01/26/2018 14.8  12.0 - 16.0 g/dL Final    HCT 01/26/2018 46.2* 35.0 - 45.0 % Final    MCV 01/26/2018 94.5  74.0 - 97.0 FL Final    MCH 01/26/2018 30.3  24.0 - 34.0 PG Final    MCHC 01/26/2018 32.0  31.0 - 37.0 g/dL Final    RDW 01/26/2018 13.8  11.6 - 14.5 % Final    PLATELET 36/56/5118 560  135 - 420 K/uL Final    MPV 01/26/2018 11.7  9.2 - 11.8 FL Final    NEUTROPHILS 01/26/2018 62  40 - 73 % Final    LYMPHOCYTES 01/26/2018 26  21 - 52 % Final    MONOCYTES 01/26/2018 7  3 - 10 % Final    EOSINOPHILS 01/26/2018 4  0 - 5 % Final    BASOPHILS 01/26/2018 1  0 - 2 % Final    ABS. NEUTROPHILS 01/26/2018 4.8  1.8 - 8.0 K/UL Final    ABS. LYMPHOCYTES 01/26/2018 1.9  0.9 - 3.6 K/UL Final    ABS. MONOCYTES 01/26/2018 0.5  0.05 - 1.2 K/UL Final    ABS. EOSINOPHILS 01/26/2018 0.3  0.0 - 0.4 K/UL Final    ABS. BASOPHILS 01/26/2018 0.0  0.0 - 0.06 K/UL Final    DF 01/26/2018 AUTOMATED    Final    Sodium 01/26/2018 146* 136 - 145 mmol/L Final    Potassium 01/26/2018 4.6  3.5 - 5.5 mmol/L Final    Chloride 01/26/2018 106  100 - 108 mmol/L Final    CO2 01/26/2018 31  21 - 32 mmol/L Final    Anion gap 01/26/2018 9  3.0 - 18 mmol/L Final    Glucose 01/26/2018 101* 74 - 99 mg/dL Final    BUN 01/26/2018 17  7.0 - 18 MG/DL Final    Creatinine 01/26/2018 0.75  0.6 - 1.3 MG/DL Final    BUN/Creatinine ratio 01/26/2018 23* 12 - 20   Final    GFR est AA 01/26/2018 >60  >60 ml/min/1.73m2 Final    GFR est non-AA 01/26/2018 >60  >60 ml/min/1.73m2 Final    Comment: (NOTE)  Estimated GFR is calculated using the Modification of Diet in Renal   Disease (MDRD) Study equation, reported for both  Americans   (GFRAA) and non- Americans (GFRNA), and normalized to 1.73m2   body surface area. The physician must decide which value applies to   the patient. The MDRD study equation should only be used in   individuals age 25 or older. It has not been validated for the   following: pregnant women, patients with serious comorbid conditions,   or on certain medications, or persons with extremes of body size,   muscle mass, or nutritional status.       Calcium 01/26/2018 9.1  8.5 - 10.1 MG/DL Final    Bilirubin, total 01/26/2018 0.7  0.2 - 1.0 MG/DL Final    ALT (SGPT) 01/26/2018 20  13 - 56 U/L Final    AST (SGOT) 01/26/2018 11* 15 - 37 U/L Final    Alk. phosphatase 01/26/2018 78  45 - 117 U/L Final    Protein, total 01/26/2018 7.0  6.4 - 8.2 g/dL Final    Albumin 01/26/2018 3.8  3.4 - 5.0 g/dL Final    Globulin 01/26/2018 3.2  2.0 - 4.0 g/dL Final    A-G Ratio 01/26/2018 1.2  0.8 - 1.7   Final    LIPID PROFILE 01/26/2018        Final    Cholesterol, total 01/26/2018 180  <200 MG/DL Final    Triglyceride 01/26/2018 113  <150 MG/DL Final    Comment: The drugs N-acetylcysteine (NAC) and  Metamiszole have been found to cause falsely  low results in this chemical assay. Please  be sure to submit blood samples obtained  BEFORE administration of either of these  drugs to assure correct results.  HDL Cholesterol 01/26/2018 48  40 - 60 MG/DL Final    LDL, calculated 01/26/2018 109.4* 0 - 100 MG/DL Final    VLDL, calculated 01/26/2018 22.6  MG/DL Final    CHOL/HDL Ratio 01/26/2018 3.8  0 - 5.0   Final       .No results found for any visits on 03/07/19. Assessment / Plan      ICD-10-CM ICD-9-CM    1. Medicare annual wellness visit, subsequent Z00.00 V70.0 DEPRESSION SCREEN ANNUAL   2. Screening for depression Z13.31 V79.0 DEPRESSION SCREEN ANNUAL   3. Encounter for hepatitis C screening test for low risk patient Z11.59 V73.89 HEPATITIS C AB   4. Benign essential hypertension I10 401.1 CBC WITH AUTOMATED DIFF      METABOLIC PANEL, COMPREHENSIVE   5. Acute URI J06.9 465.9        Labs ordered  Z Dorian  she was advised to continue her maintenance medications  she is to call if symptoms persist over five days        Follow-up Disposition:  Return in about 5 months (around 8/7/2019). I asked Joby Liu Certain if she has any questions and I answered the questions. Dottie Liu Certain states that she understands the treatment plan and agrees with the treatment plan

## 2019-06-10 RX ORDER — HYDROCHLOROTHIAZIDE 25 MG/1
TABLET ORAL
Qty: 90 TAB | Refills: 0 | Status: SHIPPED | OUTPATIENT
Start: 2019-06-10 | End: 2019-09-11 | Stop reason: SDUPTHER

## 2019-07-12 RX ORDER — LABETALOL 100 MG/1
TABLET, FILM COATED ORAL
Qty: 60 TAB | Refills: 3 | Status: SHIPPED | OUTPATIENT
Start: 2019-07-12 | End: 2019-11-26 | Stop reason: SDUPTHER

## 2019-10-28 ENCOUNTER — OFFICE VISIT (OUTPATIENT)
Dept: ORTHOPEDIC SURGERY | Facility: CLINIC | Age: 74
End: 2019-10-28

## 2019-10-28 VITALS
BODY MASS INDEX: 36.76 KG/M2 | SYSTOLIC BLOOD PRESSURE: 135 MMHG | RESPIRATION RATE: 18 BRPM | HEART RATE: 58 BPM | WEIGHT: 234.2 LBS | HEIGHT: 67 IN | OXYGEN SATURATION: 97 % | DIASTOLIC BLOOD PRESSURE: 56 MMHG | TEMPERATURE: 97.2 F

## 2019-10-28 DIAGNOSIS — M25.562 CHRONIC PAIN OF LEFT KNEE: ICD-10-CM

## 2019-10-28 DIAGNOSIS — M48.061 SPINAL STENOSIS OF LUMBAR REGION, UNSPECIFIED WHETHER NEUROGENIC CLAUDICATION PRESENT: ICD-10-CM

## 2019-10-28 DIAGNOSIS — G89.29 CHRONIC PAIN OF RIGHT KNEE: ICD-10-CM

## 2019-10-28 DIAGNOSIS — M25.561 CHRONIC PAIN OF RIGHT KNEE: ICD-10-CM

## 2019-10-28 DIAGNOSIS — M17.11 PRIMARY OSTEOARTHRITIS OF RIGHT KNEE: ICD-10-CM

## 2019-10-28 DIAGNOSIS — M17.12 PRIMARY OSTEOARTHRITIS OF LEFT KNEE: Primary | ICD-10-CM

## 2019-10-28 DIAGNOSIS — G89.29 CHRONIC PAIN OF LEFT KNEE: ICD-10-CM

## 2019-10-28 RX ORDER — BETAMETHASONE SODIUM PHOSPHATE AND BETAMETHASONE ACETATE 3; 3 MG/ML; MG/ML
6 INJECTION, SUSPENSION INTRA-ARTICULAR; INTRALESIONAL; INTRAMUSCULAR; SOFT TISSUE ONCE
Qty: 0.5 ML | Refills: 0
Start: 2019-10-28 | End: 2019-10-28

## 2019-10-28 NOTE — PROGRESS NOTES
Patient: Imelda Anderson                MRN: [id number]       SSN: [id number]  YOB: 1945        AGE: 66 y.o. SEX: female    PCP: Theo Pool MD  10/28/19    Chief Complaint   Patient presents with    Knee Pain     Angel     HISTORY:  Imelda Anderson is a 66 y.o. female who is seen for bilateral knee pain. She has been experiencing knee pain for the past several years. She has seen Dr. Sumi Baxter in 2011 and responded to previous cortisone injections. She experienced a right knee giving away episode last week while she was at [employer]. She felt immediate pain and swelling. She notes pain with standing, walking, and stair climbing. She experiences startup pain after sitting. She states she went to the [facility] a few weeks ago and she noticed her legs went numb while standing in line. She reports some back pain which radiates into her right leg. Pain Assessment  10/28/2019   Location of Pain Knee   Location Modifiers Left;Right   Severity of Pain 2   Quality of Pain Aching   Duration of Pain Persistent   Aggravating Factors Bending;Walking   Limiting Behavior Yes   Relieving Factors Rest;Elevation   Result of Injury No     Occupation, etc:  Ms. Maximo Yanez is a retired  at [employer]. She is active with her [employer]. She lives in [place]. She has 1 adult son, a 10 yo granddaughter, and a 2 yo grandson. She is not active. Her weight is stable recently. Ms. Maximo Yanez weighs 234 lbs and is 5'7\" tall.        No results found for: HBA1C, HGBE8, ETK5SYVD, ZTN9KUCH, USZ8TNSY  Weight Metrics 10/28/2019 3/7/2019 9/11/2018 2/8/2018 12/12/2017 9/14/2017 12/14/2016   Weight 234 lb 3.2 oz 242 lb 238 lb 237 lb 246 lb 240 lb 240 lb   BMI 36.68 kg/m2 37.9 kg/m2 37.28 kg/m2 37.12 kg/m2 38.53 kg/m2 37.59 kg/m2 -       Patient Active Problem List   Diagnosis Code    Benign essential hypertension I10    Osteoarthrosis involving multiple sites M15.9    Restless legs syndrome G25.81    Advance directive discussed with patient Z70.80    Severe obesity ([facility] [address] E66.01     REVIEW OF SYSTEMS: All Below are Negative except: See HPI   Constitutional: negative for fever, chills, and weight loss. Cardiovascular: negative for chest pain, claudication, leg swelling, SOB, ALMEIDA   Gastrointestinal: Negative for pain, N/V/C/D, Blood in stool or urine, dysuria, hematuria, incontinence, pelvic pain. Musculoskeletal: See HPI   Neurological: Negative for dizziness and weakness. Negative for headaches, Visual changes, confusion, seizures   Phychiatric/Behavioral: Negative for depression, memory loss, substance abuse. Extremities: Negative for hair changes, rash, or skin lesion changes. Hematologic: Negative for bleeding problems, bruising, pallor or swollen lymph nodes   Peripheral Vascular: No calf pain, no circulation deficits.     Social History     Socioeconomic History    Marital status:      Spouse name: Not on file    Number of children: Not on file    Years of education: Not on file    Highest education level: Not on file   Occupational History    Not on file   Social Needs    Financial resource strain: Not on file    Food insecurity:     Worry: Not on file     Inability: Not on file    Transportation needs:     Medical: Not on file     Non-medical: Not on file   Tobacco Use    Smoking status: Never Smoker    Smokeless tobacco: Never Used   Substance and Sexual Activity    Alcohol use: No    Drug use: No    Sexual activity: Yes     Partners: Male   Lifestyle    Physical activity:     Days per week: Not on file     Minutes per session: Not on file    Stress: Not on file   Relationships    Social connections:     Talks on phone: Not on file     Gets together: Not on file     Attends Alevism service: Not on file     Active member of club or organization: Not on file     Attends meetings of clubs or organizations: Not on file     Relationship status: Not on file   [place] Intimate partner violence:     Fear of current or ex partner: Not on file     Emotionally abused: Not on file     Physically abused: Not on file     Forced sexual activity: Not on file   Other Topics Concern    Not on file   Social History Narrative    Not on file      No Known Allergies   Current Outpatient Medications   Medication Sig    labetalol (NORMODYNE) 100 mg tablet TAKE 1 TABLET BY MOUTH TWICE DAILY (STOP  PROPRANOLOL)    docusate sodium (COLACE) 100 mg capsule Take 200 mg by mouth daily.  VIT A/VIT C/VIT E/ZINC/COPPER (OCUVITE PRESERVISION PO) Take 1 Tab by mouth daily.  cetirizine (ZYRTEC) 10 mg tablet Take 10 mg by mouth daily.  GLUCOSAMINE/CHONDR CROWE A SOD (OSTEO BI-FLEX PO) Take 2 Tabs by mouth daily.  hydroCHLOROthiazide (HYDRODIURIL) 25 mg tablet TAKE 1 TABLET BY MOUTH ONCE DAILY IN THE MORNING    propranolol (INDERAL) 20 mg tablet TAKE 1 TABLET BY MOUTH TWICE DAILY    naproxen sodium (ALEVE) 220 mg tablet Take 440 mg by mouth daily as needed for Pain. No current facility-administered medications for this visit.        PHYSICAL EXAMINATION:  Visit Vitals  /56   Pulse (!) 58   Temp 97.2 °F (36.2 °C) (Oral)   Resp 18   Ht 5' 7\" (1.702 m)   Wt 234 lb 3.2 oz (106.2 kg)   SpO2 97%   BMI 36.68 kg/m²      ORTHO EXAMINATION:  Examination Right knee Left knee   Skin Intact Intact   Range of motion 120-0 120-0   Effusion + -   Medial joint line tenderness + +   Lateral joint line tenderness - -   Popliteal tenderness - -   Osteophytes palpable + +   Yessenias - -   Patella crepitus + +   Anterior drawer - -   Lateral laxity - -   Medial laxity - -   Varus deformity - +   Valgus deformity - -   Pretibial edema - -   Calf tenderness - -         TIME OUT:  Chart reviewed for the following:   IPat MD, have reviewed the History, Physical and updated the Allergic reactions for [id number] performed immediately prior to start of procedure:  Zenobia Lea Kenyetta Guzman MD, have performed the following reviews on [facility] prior to the start of the procedure:          * Patient was identified by name and date of birth   * Agreement on procedure being performed was verified  * Risks and Benefits explained to the patient  * Procedure site verified and marked as necessary  * Patient was positioned for comfort  * Consent was obtained     Time: 9:10 AM     Date of procedure: 10/28/2019  Procedure performed by:  Arsen Garcia MD  Ms. Paez tolerated the procedure well with no complications. RADIOGRAPHS:  XR BILAT KNEE 10/28/19 RIO  IMPRESSION:  Three views - No fractures, no effusion, severe joint space narrowing, + osteophytes present. Kellgren Victor M grade 4    IMPRESSION:      ICD-10-CM ICD-9-CM    1. Primary osteoarthritis of left knee M17.12 715.16 betamethasone (CELESTONE SOLUSPAN) 6 mg/mL injection      BETAMETHASONE ACETATE & SODIUM PHOSPHATE INJECTION 3 MG EA.      DRAIN/INJECT LARGE JOINT/BURSA      PROCEDURE AUTHORIZATION TO    2. Chronic pain of right knee M25.561 719.46 AMB POC X-RAY KNEE 3 VIEW    G89.29 338.29 betamethasone (CELESTONE SOLUSPAN) 6 mg/mL injection      BETAMETHASONE ACETATE & SODIUM PHOSPHATE INJECTION 3 MG EA.      DRAIN/INJECT LARGE JOINT/BURSA      PROCEDURE AUTHORIZATION TO    3. Chronic pain of left knee M25.562 719.46 AMB POC X-RAY KNEE 3 VIEW    G89.29 338.29 betamethasone (CELESTONE SOLUSPAN) 6 mg/mL injection      BETAMETHASONE ACETATE & SODIUM PHOSPHATE INJECTION 3 MG EA.      DRAIN/INJECT LARGE JOINT/BURSA      PROCEDURE AUTHORIZATION TO    4. Primary osteoarthritis of right knee M17.11 715.16 betamethasone (CELESTONE SOLUSPAN) 6 mg/mL injection      BETAMETHASONE ACETATE & SODIUM PHOSPHATE INJECTION 3 MG EA.      DRAIN/INJECT LARGE JOINT/BURSA      PROCEDURE AUTHORIZATION TO    5.  Spinal stenosis of lumbar region, unspecified whether neurogenic claudication present M48.061 724.02      PLAN:  After discussing treatment options, patient's knees were injected with 4 cc Marcaine and 1/2 cc Celestone. Consider visco supplementation if pain continues. There is no need for surgery at this time. Dietary counseling provided today. Start weight loss with low carb diet and intermittent fasting. She will follow up as needed.       Scribed by Jaimie Tate ([address]) as dictated by Francisco Ferro MD no

## 2019-10-28 NOTE — PATIENT INSTRUCTIONS
Learning About Low-Carbohydrate Diets for Weight Loss What is a low-carbohydrate diet? Low-carb diets avoid foods that are high in carbohydrate. These high-carb foods include pasta, bread, rice, cereal, fruits, and starchy vegetables. Instead, these diets usually have you eat foods that are high in fat and protein. Many people lose weight quickly on a low-carb diet. But the early weight loss is water. People on this diet often gain the weight back after they start eating carbs again. Not all diet plans are safe or work well. A lot of the evidence shows that low-carb diets aren't healthy. That's because these diets often don't include healthy foods like fruits and vegetables. Losing weight safely means balancing protein, fat, and carbs with every meal and snack. And low-carb diets don't always provide the vitamins, minerals, and fiber you need. If you have a serious medical condition, talk to your doctor before you try any diet. These conditions include kidney disease, heart disease, type 2 diabetes, high cholesterol, and high blood pressure. If you are pregnant, it may not be safe for your baby if you are on a low-carb diet. How can you lose weight safely? You might have heard that a diet plan helped another person lose weight. But that doesn't mean that it will work for you. It is very hard to stay on a diet that includes lots of big changes in your eating habits. If you want to get to a healthy weight and stay there, making healthy lifestyle changes will often work better than dieting. These steps can help. · Make a plan for change. Work with your doctor to create a plan that is right for you. · See a dietitian. He or she can show you how to make healthy changes in your eating habits. · Manage stress. If you have a lot of stress in your life, it can be hard to focus on making healthy changes to your daily habits. · Track your food and activity.  You are likely to do better at losing weight if you keep track of what you eat and what you do. Follow-up care is a key part of your treatment and safety. Be sure to make and go to all appointments, and call your doctor if you are having problems. It's also a good idea to know your test results and keep a list of the medicines you take. Where can you learn more? Go to http://figueroa-jolie.info/. Enter A121 in the search box to learn more about \"Learning About Low-Carbohydrate Diets for Weight Loss. \" Current as of: November 7, 2018 Content Version: 12.2 © 4962-9268 [employer]. Care instructions adapted under license by [employer] (which disclaims liability or warranty for this information). If you have questions about a medical condition or this instruction, always ask your healthcare professional. [address] any warranty or liability for your use of this information.

## 2019-10-28 NOTE — PROGRESS NOTES
Verbal order given by Dr. Reyna Salazar to draw up 4mL 0.25% Sensorcaine and 0.5 mL 30 mg/5mL Betamethasone times two.

## 2019-11-14 ENCOUNTER — OFFICE VISIT (OUTPATIENT)
Dept: ORTHOPEDIC SURGERY | Facility: CLINIC | Age: 74
End: 2019-11-14

## 2019-11-14 VITALS
WEIGHT: 236 LBS | HEART RATE: 61 BPM | HEIGHT: 67 IN | BODY MASS INDEX: 37.04 KG/M2 | SYSTOLIC BLOOD PRESSURE: 139 MMHG | RESPIRATION RATE: 16 BRPM | TEMPERATURE: 97 F | DIASTOLIC BLOOD PRESSURE: 54 MMHG

## 2019-11-14 DIAGNOSIS — M54.30 SCIATICA, UNSPECIFIED LATERALITY: Primary | ICD-10-CM

## 2019-11-14 DIAGNOSIS — M17.12 PRIMARY OSTEOARTHRITIS OF LEFT KNEE: ICD-10-CM

## 2019-11-14 DIAGNOSIS — G89.29 CHRONIC PAIN OF RIGHT KNEE: ICD-10-CM

## 2019-11-14 DIAGNOSIS — M25.561 CHRONIC PAIN OF RIGHT KNEE: ICD-10-CM

## 2019-11-14 DIAGNOSIS — M54.50 LUMBAR PAIN: ICD-10-CM

## 2019-11-14 DIAGNOSIS — M17.11 PRIMARY OSTEOARTHRITIS OF RIGHT KNEE: ICD-10-CM

## 2019-11-14 DIAGNOSIS — M25.562 CHRONIC PAIN OF LEFT KNEE: ICD-10-CM

## 2019-11-14 DIAGNOSIS — G89.29 CHRONIC PAIN OF LEFT KNEE: ICD-10-CM

## 2019-11-14 RX ORDER — HYALURONATE SODIUM 10 MG/ML
2 SYRINGE (ML) INTRAARTICULAR ONCE
Qty: 2 ML | Refills: 0
Start: 2019-11-14 | End: 2019-11-14

## 2019-11-14 NOTE — PROGRESS NOTES
Patient: Kem Collet                MRN: [id number]       SSN: [id number]  YOB: 1945        AGE: 66 y.o. SEX: female    PCP: Raven Whelan MD  11/14/19    CC: BACK AND BILATERAL KNEE PAIN    HISTORY:  Kem Collet is a 66 y.o. female who is seen for back and bilateral knee pain. She notes back pain with standing and walking. She has a h/o sciatica--radiates into her right leg. She states she went to the [facility] a couple months ago and she noticed her legs went numb while standing in line. She has attended PT in the past with relief. She is also seen for bilateral knee pain. She has been experiencing knee pain for the past several years. She has seen Dr. James Muse in 2011 and responded to previous cortisone injections. She experienced a right knee giving away episode last week while she was at [employer]. She felt immediate pain and swelling. She notes pain with standing, walking, and stair climbing. She experiences startup pain after sitting. Pain Assessment  11/14/2019   Location of Pain Knee   Location Modifiers Left;Right   Severity of Pain 2   Quality of Pain -   Duration of Pain A few hours   Frequency of Pain Constant   Aggravating Factors Walking;Bending;Stretching;Straightening   Limiting Behavior -   Relieving Factors Rest   Result of Injury No     Occupation, etc:  Ms. César Perez is a retired  at [employer]. She is active with her Sikhism. She lives in [place] with her . She has 1 adult son, a 10 yo granddaughter, and a 2 yo grandson. She cares for her . She is not active. She lost 15 pounds recently. Ms. César Perez weighs 234 lbs and is 5'7\" tall.        No results found for: HBA1C, HGBE8, SZE6CSKC, PEK0BYPT, XBK8KTOP  Weight Metrics 11/14/2019 10/28/2019 3/7/2019 9/11/2018 2/8/2018 12/12/2017 9/14/2017   Weight 236 lb 234 lb 3.2 oz 242 lb 238 lb 237 lb 246 lb 240 lb   BMI 36.96 kg/m2 36.68 kg/m2 37.9 kg/m2 37.28 kg/m2 37.12 kg/m2 38.53 kg/m2 37.59 kg/m2       Patient Active Problem List   Diagnosis Code    Benign essential hypertension I10    Osteoarthrosis involving multiple sites M15.9    Restless legs syndrome G25.81    Advance directive discussed with patient Z71.89    Severe obesity ([facility] [address] E66.01     REVIEW OF SYSTEMS: All Below are Negative except: See HPI   Constitutional: negative for fever, chills, and weight loss. Cardiovascular: negative for chest pain, claudication, leg swelling, SOB, ALMEIDA   Gastrointestinal: Negative for pain, N/V/C/D, Blood in stool or urine, dysuria, hematuria, incontinence, pelvic pain. Musculoskeletal: See HPI   Neurological: Negative for dizziness and weakness. Negative for headaches, Visual changes, confusion, seizures   Phychiatric/Behavioral: Negative for depression, memory loss, substance abuse. Extremities: Negative for hair changes, rash, or skin lesion changes. Hematologic: Negative for bleeding problems, bruising, pallor or swollen lymph nodes   Peripheral Vascular: No calf pain, no circulation deficits.     Social History     Socioeconomic History    Marital status:      Spouse name: Not on file    Number of children: Not on file    Years of education: Not on file    Highest education level: Not on file   Occupational History    Not on file   Social Needs    Financial resource strain: Not on file    Food insecurity:     Worry: Not on file     Inability: Not on file    Transportation needs:     Medical: Not on file     Non-medical: Not on file   Tobacco Use    Smoking status: Never Smoker    Smokeless tobacco: Never Used   Substance and Sexual Activity    Alcohol use: No    Drug use: No    Sexual activity: Yes     Partners: Male   Lifestyle    Physical activity:     Days per week: Not on file     Minutes per session: Not on file    Stress: Not on file   Relationships    Social connections:     Talks on phone: Not on file     Gets together: Not on file Attends Sikhism service: Not on file     Active member of club or organization: Not on file     Attends meetings of clubs or organizations: Not on file     Relationship status: Not on file    Intimate partner violence:     Fear of current or ex partner: Not on file     Emotionally abused: Not on file     Physically abused: Not on file     Forced sexual activity: Not on file   Other Topics Concern    Not on file   Social History Narrative    Not on file      No Known Allergies   Current Outpatient Medications   Medication Sig    hydroCHLOROthiazide (HYDRODIURIL) 25 mg tablet TAKE 1 TABLET BY MOUTH ONCE DAILY IN THE MORNING    labetalol (NORMODYNE) 100 mg tablet TAKE 1 TABLET BY MOUTH TWICE DAILY (STOP  PROPRANOLOL)    docusate sodium (COLACE) 100 mg capsule Take 200 mg by mouth daily.  propranolol (INDERAL) 20 mg tablet TAKE 1 TABLET BY MOUTH TWICE DAILY    VIT A/VIT C/VIT E/ZINC/COPPER (OCUVITE PRESERVISION PO) Take 1 Tab by mouth daily.  cetirizine (ZYRTEC) 10 mg tablet Take 10 mg by mouth daily.  GLUCOSAMINE/CHONDR CROWE A SOD (OSTEO BI-FLEX PO) Take 2 Tabs by mouth daily.  naproxen sodium (ALEVE) 220 mg tablet Take 440 mg by mouth daily as needed for Pain. No current facility-administered medications for this visit.        PHYSICAL EXAMINATION:  Visit Vitals  /54   Pulse 61   Temp 97 °F (36.1 °C) (Oral)   Resp 16   Ht 5' 7\" (1.702 m)   Wt 236 lb (107 kg)   BMI 36.96 kg/m²      ORTHO EXAMINATION:  Examination Right knee Left knee   Skin Intact Intact   Range of motion 120-0 120-0   Effusion + -   Medial joint line tenderness + +   Lateral joint line tenderness - -   Popliteal tenderness - -   Osteophytes palpable + +   Yessenias - -   Patella crepitus + +   Anterior drawer - -   Lateral laxity - -   Medial laxity - -   Varus deformity - +   Valgus deformity - -   Pretibial edema - -   Calf tenderness - -     Examination Lumbar Thoracic   Skin Intact Intact   Tenderness + - Tightness - -   Lordosis Normal N/A   Kyphosis N/A Normal   Scoliosis - -   Flexion Fingertips to mid shin N/A   Extension 10 N/A   Knee reflexes Normal N/A   Ankle reflexes Normal N/A   Straight leg raise - N/A   Calf tenderness - N/A       TIME OUT:  Chart reviewed for the following:   Julieta Arizmendi MD, have reviewed the History, Physical and updated the Allergic reactions for [address] performed immediately prior to start of procedure:  Julieta Arizmendi MD, have performed the following reviews on [facility] prior to the start of the procedure:          * Patient was identified by name and date of birth   * Agreement on procedure being performed was verified  * Risks and Benefits explained to the patient  * Procedure site verified and marked as necessary  * Patient was positioned for comfort  * Consent was obtained     Time: 10:43 AM    Date of procedure: 11/14/2019  Procedure performed by:  Lili Boogie MD  Ms. Paez tolerated the procedure well with no complications. RADIOGRAPHS:  XR BILAT KNEE 10/28/19 RIO  IMPRESSION:  Three views - No fractures, no effusion, severe joint space narrowing, + osteophytes present. Kellgren Victor M grade 4    IMPRESSION:      ICD-10-CM ICD-9-CM    1. Sciatica, unspecified laterality M54.30 724.3 REFERRAL TO SPINE SURGERY   2. Primary osteoarthritis of left knee M17.12 715.16 KS DRAIN/INJECT LARGE JOINT/BURSA      EUFLEXXA INJECTION PER DOSE      sodium hyaluronate (SUPARTZ FX/HYALGAN/GENIVSC) 10 mg/mL syrg injection   3. Chronic pain of left knee M25.562 719.46 KS DRAIN/INJECT LARGE JOINT/BURSA    G89.29 338.29 EUFLEXXA INJECTION PER DOSE      sodium hyaluronate (SUPARTZ FX/HYALGAN/GENIVSC) 10 mg/mL syrg injection   4. Primary osteoarthritis of right knee M17.11 715.16 KS DRAIN/INJECT LARGE JOINT/BURSA      EUFLEXXA INJECTION PER DOSE      sodium hyaluronate (SUPARTZ FX/HYALGAN/GENIVSC) 10 mg/mL syrg injection   5. Chronic pain of right knee M25.561 719.46 MO DRAIN/INJECT LARGE JOINT/BURSA    G89.29 338.29 EUFLEXXA INJECTION PER DOSE      sodium hyaluronate (SUPARTZ FX/HYALGAN/GENIVSC) 10 mg/mL syrg injection   6. Lumbar pain M54.5 724.2 REFERRAL TO SPINE SURGERY     PLAN:  After discussing treatment options, patient's knees were injected with 2 cc Euflexxa. There is no need for surgery at this time. Dietary counseling provided today. Start weight loss with low carb diet and intermittent fasting. She will follow up in one week for Euflexxa #2. She will follow up at the spine center.      Scribed by Ayush Richards ([address] [place] [address] [address]) as dictated by Gianni Scott MD

## 2019-11-14 NOTE — PROGRESS NOTES
1. Have you been to the ER, urgent care clinic since your last visit? Hospitalized since your last visit? No    2. Have you seen or consulted any other health care providers outside of the [address] [place] [address] since your last visit? Include any pap smears or colon screening.  No

## 2019-11-21 ENCOUNTER — HOSPITAL ENCOUNTER (OUTPATIENT)
Dept: LAB | Age: 74
Discharge: HOME | End: 2019-11-21
Payer: MEDICARE

## 2019-11-21 DIAGNOSIS — E78.00 PURE HYPERCHOLESTEROLEMIA: ICD-10-CM

## 2019-11-21 DIAGNOSIS — I10 BENIGN ESSENTIAL HYPERTENSION: ICD-10-CM

## 2019-11-21 LAB
ALBUMIN SERPL-MCNC: 3.9 G/DL (ref 3.4–5)
ALBUMIN/GLOB SERPL: 1.3 {RATIO} (ref 0.8–1.7)
ALP SERPL-CCNC: 82 U/L (ref 45–117)
ALT SERPL-CCNC: 27 U/L (ref 13–56)
ANION GAP SERPL CALC-SCNC: 4 MMOL/L (ref 3–18)
AST SERPL-CCNC: 15 U/L (ref 10–38)
BASOPHILS # BLD: 0 K/UL (ref 0–0.1)
BASOPHILS NFR BLD: 0 % (ref 0–2)
BILIRUB SERPL-MCNC: 0.6 MG/DL (ref 0.2–1)
BUN SERPL-MCNC: 18 MG/DL (ref 7–18)
BUN/CREAT SERPL: 23 (ref 12–20)
CALCIUM SERPL-MCNC: 8.9 MG/DL (ref 8.5–10.1)
CHLORIDE SERPL-SCNC: 109 MMOL/L (ref 100–111)
CO2 SERPL-SCNC: 30 MMOL/L (ref 21–32)
CREAT SERPL-MCNC: 0.77 MG/DL (ref 0.6–1.3)
DIFFERENTIAL METHOD BLD: NORMAL
EOSINOPHIL # BLD: 0.3 K/UL (ref 0–0.4)
EOSINOPHIL NFR BLD: 4 % (ref 0–5)
ERYTHROCYTE [DISTWIDTH] IN BLOOD BY AUTOMATED COUNT: 13.9 % (ref 11.6–14.5)
GLOBULIN SER CALC-MCNC: 2.9 G/DL (ref 2–4)
GLUCOSE SERPL-MCNC: 88 MG/DL (ref 74–99)
HCT VFR BLD AUTO: 42.4 % (ref 35–45)
HGB BLD-MCNC: 13.4 G/DL (ref 12–16)
LYMPHOCYTES # BLD: 1.6 K/UL (ref 0.9–3.6)
LYMPHOCYTES NFR BLD: 22 % (ref 21–52)
MCH RBC QN AUTO: 29.7 PG (ref 24–34)
MCHC RBC AUTO-ENTMCNC: 31.6 G/DL (ref 31–37)
MCV RBC AUTO: 94 FL (ref 74–97)
MONOCYTES # BLD: 0.6 K/UL (ref 0.05–1.2)
MONOCYTES NFR BLD: 8 % (ref 3–10)
NEUTS SEG # BLD: 4.8 K/UL (ref 1.8–8)
NEUTS SEG NFR BLD: 66 % (ref 40–73)
PLATELET # BLD AUTO: 256 K/UL (ref 135–420)
PMV BLD AUTO: 11.2 FL (ref 9.2–11.8)
POTASSIUM SERPL-SCNC: 4.1 MMOL/L (ref 3.5–5.5)
PROT SERPL-MCNC: 6.8 G/DL (ref 6.4–8.2)
RBC # BLD AUTO: 4.51 M/UL (ref 4.2–5.3)
SODIUM SERPL-SCNC: 143 MMOL/L (ref 136–145)
WBC # BLD AUTO: 7.2 K/UL (ref 4.6–13.2)

## 2019-11-21 PROCEDURE — 80053 COMPREHEN METABOLIC PANEL: CPT

## 2019-11-21 PROCEDURE — 36415 COLL VENOUS BLD VENIPUNCTURE: CPT

## 2019-11-21 PROCEDURE — 85025 COMPLETE CBC W/AUTO DIFF WBC: CPT

## 2019-11-21 PROCEDURE — 80061 LIPID PANEL: CPT

## 2019-11-22 ENCOUNTER — OFFICE VISIT (OUTPATIENT)
Dept: ORTHOPEDIC SURGERY | Facility: CLINIC | Age: 74
End: 2019-11-22

## 2019-11-22 VITALS
RESPIRATION RATE: 16 BRPM | HEIGHT: 67 IN | TEMPERATURE: 96.3 F | WEIGHT: 237 LBS | HEART RATE: 63 BPM | SYSTOLIC BLOOD PRESSURE: 129 MMHG | DIASTOLIC BLOOD PRESSURE: 57 MMHG | BODY MASS INDEX: 37.2 KG/M2

## 2019-11-22 DIAGNOSIS — G89.29 CHRONIC PAIN OF LEFT KNEE: ICD-10-CM

## 2019-11-22 DIAGNOSIS — M17.12 PRIMARY OSTEOARTHRITIS OF LEFT KNEE: Primary | ICD-10-CM

## 2019-11-22 DIAGNOSIS — M17.11 PRIMARY OSTEOARTHRITIS OF RIGHT KNEE: ICD-10-CM

## 2019-11-22 DIAGNOSIS — M25.561 CHRONIC PAIN OF RIGHT KNEE: ICD-10-CM

## 2019-11-22 DIAGNOSIS — M25.562 CHRONIC PAIN OF LEFT KNEE: ICD-10-CM

## 2019-11-22 DIAGNOSIS — G89.29 CHRONIC PAIN OF RIGHT KNEE: ICD-10-CM

## 2019-11-22 LAB
CHOLEST SERPL-MCNC: 185 MG/DL
HDLC SERPL-MCNC: 52 MG/DL (ref 40–60)
HDLC SERPL: 3.6 {RATIO} (ref 0–5)
LDLC SERPL CALC-MCNC: 120 MG/DL (ref 0–100)
LIPID PROFILE,FLP: ABNORMAL
TRIGL SERPL-MCNC: 65 MG/DL (ref ?–150)
VLDLC SERPL CALC-MCNC: 13 MG/DL

## 2019-11-22 RX ORDER — HYALURONATE SODIUM 10 MG/ML
2 SYRINGE (ML) INTRAARTICULAR ONCE
Qty: 2 ML | Refills: 0
Start: 2019-11-22 | End: 2019-11-22

## 2019-11-22 RX ORDER — DEXTROMETHORPHAN HYDROBROMIDE, GUAIFENESIN 5; 100 MG/5ML; MG/5ML
650 LIQUID ORAL EVERY 8 HOURS
COMMUNITY

## 2019-11-22 NOTE — PROGRESS NOTES
1. Have you been to the ER, urgent care clinic since your last visit? Hospitalized since your last visit? NO    2. Have you seen or consulted any other health care providers outside of the [address] [place] [address] since your last visit? Include any pap smears or colon screening.    NO

## 2019-11-26 RX ORDER — LABETALOL 100 MG/1
TABLET, FILM COATED ORAL
Qty: 60 TAB | Refills: 3 | Status: SHIPPED | OUTPATIENT
Start: 2019-11-26 | End: 2020-04-14 | Stop reason: SDUPTHER

## 2019-12-02 ENCOUNTER — OFFICE VISIT (OUTPATIENT)
Dept: FAMILY MEDICINE CLINIC | Facility: CLINIC | Age: 74
End: 2019-12-02

## 2019-12-02 ENCOUNTER — OFFICE VISIT (OUTPATIENT)
Dept: ORTHOPEDIC SURGERY | Facility: CLINIC | Age: 74
End: 2019-12-02

## 2019-12-02 VITALS
HEIGHT: 67 IN | SYSTOLIC BLOOD PRESSURE: 170 MMHG | HEART RATE: 71 BPM | RESPIRATION RATE: 16 BRPM | DIASTOLIC BLOOD PRESSURE: 80 MMHG | OXYGEN SATURATION: 96 % | WEIGHT: 235 LBS | BODY MASS INDEX: 36.88 KG/M2 | TEMPERATURE: 97.7 F

## 2019-12-02 VITALS
DIASTOLIC BLOOD PRESSURE: 77 MMHG | WEIGHT: 232.6 LBS | BODY MASS INDEX: 36.51 KG/M2 | RESPIRATION RATE: 18 BRPM | TEMPERATURE: 97.8 F | HEIGHT: 67 IN | HEART RATE: 63 BPM | OXYGEN SATURATION: 97 % | SYSTOLIC BLOOD PRESSURE: 186 MMHG

## 2019-12-02 DIAGNOSIS — G89.29 CHRONIC PAIN OF RIGHT KNEE: ICD-10-CM

## 2019-12-02 DIAGNOSIS — G89.29 CHRONIC PAIN OF LEFT KNEE: ICD-10-CM

## 2019-12-02 DIAGNOSIS — M25.562 CHRONIC PAIN OF LEFT KNEE: ICD-10-CM

## 2019-12-02 DIAGNOSIS — M17.12 PRIMARY OSTEOARTHRITIS OF LEFT KNEE: Primary | ICD-10-CM

## 2019-12-02 DIAGNOSIS — M17.11 PRIMARY OSTEOARTHRITIS OF RIGHT KNEE: ICD-10-CM

## 2019-12-02 DIAGNOSIS — J40 BRONCHITIS: Primary | ICD-10-CM

## 2019-12-02 DIAGNOSIS — M25.561 CHRONIC PAIN OF RIGHT KNEE: ICD-10-CM

## 2019-12-02 RX ORDER — HYALURONATE SODIUM 10 MG/ML
2 SYRINGE (ML) INTRAARTICULAR ONCE
Qty: 2 ML | Refills: 0
Start: 2019-12-02 | End: 2019-12-02

## 2019-12-02 RX ORDER — PREDNISONE 20 MG/1
TABLET ORAL
Qty: 20 TAB | Refills: 0 | Status: SHIPPED | OUTPATIENT
Start: 2019-12-02 | End: 2020-02-06

## 2019-12-02 RX ORDER — AMOXICILLIN AND CLAVULANATE POTASSIUM 875; 125 MG/1; MG/1
TABLET, FILM COATED ORAL
Qty: 14 TAB | Refills: 0 | Status: SHIPPED | OUTPATIENT
Start: 2019-12-02 | End: 2020-02-06

## 2019-12-02 NOTE — PROGRESS NOTES
Sharita Shah presents today for   Chief Complaint   Patient presents with    Cold Symptoms    Sore Throat    Lucie Macias 59 preferred language for health care discussion is english. Is someone accompanying this pt? no    Is the patient using any DME equipment during OV? no    Depression Screening:  3 most recent PHQ Screens 12/2/2019   PHQ Not Done Patient Decline   Little interest or pleasure in doing things Not at all   Feeling down, depressed, irritable, or hopeless Not at all   Total Score PHQ 2 0       Learning Assessment:  Learning Assessment 2/8/2018   PRIMARY LEARNER Patient   HIGHEST LEVEL OF EDUCATION - PRIMARY LEARNER  GRADUATED HIGH SCHOOL OR GED   BARRIERS PRIMARY LEARNER NONE   CO-LEARNER CAREGIVER No   PRIMARY LANGUAGE ENGLISH    NEED No   LEARNER PREFERENCE PRIMARY DEMONSTRATION   LEARNING SPECIAL TOPICS no   ANSWERED BY self   RELATIONSHIP SELF   ASSESSMENT COMMENT no       Abuse Screening:  Abuse Screening Questionnaire 3/7/2019   Do you ever feel afraid of your partner? N   Are you in a relationship with someone who physically or mentally threatens you? N   Is it safe for you to go home? Y       Fall Risk  Fall Risk Assessment, last 12 mths 12/2/2019   Able to walk? Yes   Fall in past 12 months? No       Health Maintenance reviewed and discussed and ordered per Provider. Health Maintenance Due   Topic Date Due    Shingrix Vaccine Age 49> (1 of 2) 10/01/1993    GLAUCOMA SCREENING Q2Y  10/01/2008   . Sharita Shah is updated on all     Pt currently taking Antiplatelet therapy? no    Coordination of Care:  1. Have you been to the ER, urgent care clinic since your last visit? no Hospitalized since your last visit? no    2. Have you seen or consulted any other health care providers outside of the [address] [place] [address] since your last visit? no Include any pap smears or colon screening.  no

## 2019-12-02 NOTE — PROGRESS NOTES
Patient: Ana Winchester                MRN: [id number]       SSN: [id number]  YOB: 1945        AGE: 66 y.o. SEX: female  Body mass index is 36.43 kg/m². PCP: Orly Hart MD  12/02/19    Chief Complaint   Patient presents with    Knee Pain     Angel     HISTORY:  Ana Winchester is a 66 y.o. female who is seen for bilateral knee pain. TIME OUT performed immediately prior to start of procedure:  Orly Javier MD, have performed the following reviews on Ana Winchester prior to the start of the procedure:            * Patient was identified by name and date of birth   * Agreement on procedure being performed was verified  * Risks and Benefits explained to the patient  * Procedure site verified and marked as necessary  * Patient was positioned for comfort  * Consent was obtained     Time: 9:43 AM     Date of procedure: 12/2/2019    Procedure performed by:  Pat Cedillo MD    Ms. Paez tolerated the procedure well with no complications      GVM-18-XD ICD-9-CM    1. Primary osteoarthritis of left knee M17.12 715.16 ME DRAIN/INJECT LARGE JOINT/BURSA      EUFLEXXA INJECTION PER DOSE      sodium hyaluronate (SUPARTZ FX/HYALGAN/GENIVSC) 10 mg/mL syrg injection   2. Chronic pain of left knee M25.562 719.46 ME DRAIN/INJECT LARGE JOINT/BURSA    G89.29 338.29 EUFLEXXA INJECTION PER DOSE      sodium hyaluronate (SUPARTZ FX/HYALGAN/GENIVSC) 10 mg/mL syrg injection   3. Primary osteoarthritis of right knee M17.11 715.16 ME DRAIN/INJECT LARGE JOINT/BURSA      EUFLEXXA INJECTION PER DOSE      sodium hyaluronate (SUPARTZ FX/HYALGAN/GENIVSC) 10 mg/mL syrg injection   4. Chronic pain of right knee M25.561 719.46 ME DRAIN/INJECT LARGE JOINT/BURSA    G89.29 338.29 EUFLEXXA INJECTION PER DOSE      sodium hyaluronate (SUPARTZ FX/HYALGAN/GENIVSC) 10 mg/mL syrg injection     PLAN:  After discussing treatment options, patient's knees were injected with 2 cc of Euflexxa. Ms. Hunt Patch will follow up PRN now that she has completed her visco supplementation injection series.       Scribed by Ginny Gordillo ([address] [facility] [address]) as dictated by Sandra Sweet MD

## 2019-12-05 NOTE — PROGRESS NOTES
The patient presents to the office today with the chief complaint of cough    HPI    The patient complains of four days of chest congestion with a cough. The cough is productive of a small amount of yellowish sputum. The patient has mild dyspnea. She denies fever. Review of Systems   HENT: Positive for congestion. Respiratory: Positive for cough and shortness of breath (Mild). Cardiovascular: Negative for chest pain and leg swelling. No Known Allergies    Current Outpatient Medications   Medication Sig Dispense Refill    amoxicillin-clavulanate (AUGMENTIN) 875-125 mg per tablet 1 tab twice per day with food 14 Tab 0    predniSONE (DELTASONE) 20 mg tablet 3 tabs daily x 3 days, 2 tabs daily x 3 days, 1 tab daily x 3 days, 1/2 tab daily x 3 days 20 Tab 0    hydroCHLOROthiazide (HYDRODIURIL) 25 mg tablet TAKE 1 TABLET BY MOUTH ONCE DAILY IN THE MORNING 30 Tab 2    acetaminophen (TYLENOL ARTHRITIS PAIN) 650 mg TbER Take 650 mg by mouth every eight (8) hours.  docusate sodium (COLACE) 100 mg capsule Take 200 mg by mouth daily.  propranolol (INDERAL) 20 mg tablet TAKE 1 TABLET BY MOUTH TWICE DAILY 180 Tab 3    VIT A/VIT C/VIT E/ZINC/COPPER (OCUVITE PRESERVISION PO) Take 1 Tab by mouth daily.  cetirizine (ZYRTEC) 10 mg tablet Take 10 mg by mouth daily.  GLUCOSAMINE/CHONDR CROWE A SOD (OSTEO BI-FLEX PO) Take 2 Tabs by mouth daily.       labetalol (NORMODYNE) 100 mg tablet TAKE 1 TABLET BY MOUTH TWICE DAILY 60 Tab 3       Past Medical History:   Diagnosis Date    Benign essential hypertension     Ill-defined condition     sciatica    Macular degeneration     followed by Dr. Rosalina Nugent involving multiple sites     Restless legs syndrome        Past Surgical History:   Procedure Laterality Date    HX BREAST BIOPSY      benign    HX  SECTION      HX HYSTERECTOMY      HX OOPHORECTOMY Right        Social History     Socioeconomic History    Marital status:      Spouse name: Not on file    Number of children: Not on file    Years of education: Not on file    Highest education level: Not on file   Occupational History    Not on file   Social Needs    Financial resource strain: Not on file    Food insecurity:     Worry: Not on file     Inability: Not on file    Transportation needs:     Medical: Not on file     Non-medical: Not on file   Tobacco Use    Smoking status: Never Smoker    Smokeless tobacco: Never Used   Substance and Sexual Activity    Alcohol use: No    Drug use: No    Sexual activity: Yes     Partners: Male   Lifestyle    Physical activity:     Days per week: Not on file     Minutes per session: Not on file    Stress: Not on file   Relationships    Social connections:     Talks on phone: Not on file     Gets together: Not on file     Attends Hinduism service: Not on file     Active member of club or organization: Not on file     Attends meetings of clubs or organizations: Not on file     Relationship status: Not on file    Intimate partner violence:     Fear of current or ex partner: Not on file     Emotionally abused: Not on file     Physically abused: Not on file     Forced sexual activity: Not on file   Other Topics Concern    Not on file   Social History Narrative    Not on file       Patient does not have an advanced directive on file    Visit Vitals  /80 (BP 1 Location: Left arm, BP Patient Position: Sitting)   Pulse 71   Temp 97.7 °F (36.5 °C) (Tympanic)   Resp 16   Ht 5' 7\" (1.702 m)   Wt 235 lb (106.6 kg)   SpO2 96%   BMI 36.81 kg/m²       Physical Exam  HENT:      Mouth/Throat:      Pharynx: No oropharyngeal exudate. Cardiovascular:      Rate and Rhythm: Normal rate and regular rhythm. Heart sounds: No murmur. No gallop. Pulmonary:      Breath sounds: Rhonchi (Few scattered rhonchi) present. No wheezing or rales.            Hospital Outpatient Visit on 11/21/2019   Component Date Value Ref Range Status    WBC 11/21/2019 7.2  4.6 - 13.2 K/uL Final    RBC 11/21/2019 4.51  4.20 - 5.30 M/uL Final    HGB 11/21/2019 13.4  12.0 - 16.0 g/dL Final    HCT 11/21/2019 42.4  35.0 - 45.0 % Final    MCV 11/21/2019 94.0  74.0 - 97.0 FL Final    MCH 11/21/2019 29.7  24.0 - 34.0 PG Final    MCHC 11/21/2019 31.6  31.0 - 37.0 g/dL Final    RDW 11/21/2019 13.9  11.6 - 14.5 % Final    PLATELET 01/99/2609 796  135 - 420 K/uL Final    MPV 11/21/2019 11.2  9.2 - 11.8 FL Final    NEUTROPHILS 11/21/2019 66  40 - 73 % Final    LYMPHOCYTES 11/21/2019 22  21 - 52 % Final    MONOCYTES 11/21/2019 8  3 - 10 % Final    EOSINOPHILS 11/21/2019 4  0 - 5 % Final    BASOPHILS 11/21/2019 0  0 - 2 % Final    ABS. NEUTROPHILS 11/21/2019 4.8  1.8 - 8.0 K/UL Final    ABS. LYMPHOCYTES 11/21/2019 1.6  0.9 - 3.6 K/UL Final    ABS. MONOCYTES 11/21/2019 0.6  0.05 - 1.2 K/UL Final    ABS. EOSINOPHILS 11/21/2019 0.3  0.0 - 0.4 K/UL Final    ABS. BASOPHILS 11/21/2019 0.0  0.0 - 0.1 K/UL Final    DF 11/21/2019 AUTOMATED    Final    Sodium 11/21/2019 143  136 - 145 mmol/L Final    Potassium 11/21/2019 4.1  3.5 - 5.5 mmol/L Final    Chloride 11/21/2019 109  100 - 111 mmol/L Final    CO2 11/21/2019 30  21 - 32 mmol/L Final    Anion gap 11/21/2019 4  3.0 - 18 mmol/L Final    Glucose 11/21/2019 88  74 - 99 mg/dL Final    BUN 11/21/2019 18  7.0 - 18 MG/DL Final    Creatinine 11/21/2019 0.77  0.6 - 1.3 MG/DL Final    BUN/Creatinine ratio 11/21/2019 23* 12 - 20   Final    GFR est AA 11/21/2019 >60  >60 ml/min/1.73m2 Final    GFR est non-AA 11/21/2019 >60  >60 ml/min/1.73m2 Final    Comment: (NOTE)  Estimated GFR is calculated using the Modification of Diet in Renal   Disease (MDRD) Study equation, reported for both  Americans   (GFRAA) and non- Americans (GFRNA), and normalized to 1.73m2   body surface area. The physician must decide which value applies to   the patient.  The MDRD study equation should only be used in individuals age 25 or older. It has not been validated for the   following: pregnant women, patients with serious comorbid conditions,   or on certain medications, or persons with extremes of body size,   muscle mass, or nutritional status.  Calcium 11/21/2019 8.9  8.5 - 10.1 MG/DL Final    Bilirubin, total 11/21/2019 0.6  0.2 - 1.0 MG/DL Final    ALT (SGPT) 11/21/2019 27  13 - 56 U/L Final    AST (SGOT) 11/21/2019 15  10 - 38 U/L Final    Alk. phosphatase 11/21/2019 82  45 - 117 U/L Final    Protein, total 11/21/2019 6.8  6.4 - 8.2 g/dL Final    Albumin 11/21/2019 3.9  3.4 - 5.0 g/dL Final    Globulin 11/21/2019 2.9  2.0 - 4.0 g/dL Final    A-G Ratio 11/21/2019 1.3  0.8 - 1.7   Final    LIPID PROFILE 11/21/2019        Final    Cholesterol, total 11/21/2019 185  <200 MG/DL Final    Triglyceride 11/21/2019 65  <150 MG/DL Final    Comment: The drugs N-acetylcysteine (NAC) and  Metamiszole have been found to cause falsely  low results in this chemical assay. Please  be sure to submit blood samples obtained  BEFORE administration of either of these  drugs to assure correct results.  HDL Cholesterol 11/21/2019 52  40 - 60 MG/DL Final    LDL, calculated 11/21/2019 120* 0 - 100 MG/DL Final    VLDL, calculated 11/21/2019 13  MG/DL Final    CHOL/HDL Ratio 11/21/2019 3.6  0 - 5.0   Final       .No results found for any visits on 12/02/19. Assessment / Plan      ICD-10-CM ICD-9-CM    1. Bronchitis J40 490      Augmentin  Prednisone  she was advised to continue her maintenance medications  she is to call if symptoms persist over five days        Follow-up and Dispositions    · Return in about 6 months (around 6/2/2020). I asked Vilma Zaidi if she has any questions and I answered the questions. Dottie Zaidi states that she understands the treatment plan and agrees with the treatment plan          THIS DOCUMENT WAS CREATED WITH A VOICE ACTIVATED DICTATION SYSTEM.   IT MAY CONTAIN TRANSCRIPTION ERRORS

## 2020-02-06 ENCOUNTER — OFFICE VISIT (OUTPATIENT)
Dept: FAMILY MEDICINE CLINIC | Facility: CLINIC | Age: 75
End: 2020-02-06

## 2020-02-06 VITALS
HEART RATE: 65 BPM | OXYGEN SATURATION: 96 % | WEIGHT: 227 LBS | RESPIRATION RATE: 12 BRPM | HEIGHT: 67 IN | SYSTOLIC BLOOD PRESSURE: 138 MMHG | TEMPERATURE: 99.2 F | BODY MASS INDEX: 35.63 KG/M2 | DIASTOLIC BLOOD PRESSURE: 64 MMHG

## 2020-02-06 DIAGNOSIS — I10 BENIGN ESSENTIAL HYPERTENSION: ICD-10-CM

## 2020-02-06 DIAGNOSIS — E78.00 PURE HYPERCHOLESTEROLEMIA: ICD-10-CM

## 2020-02-06 DIAGNOSIS — G25.81 RESTLESS LEGS SYNDROME: ICD-10-CM

## 2020-02-06 DIAGNOSIS — Z00.00 INITIAL MEDICARE ANNUAL WELLNESS VISIT: Primary | ICD-10-CM

## 2020-02-06 NOTE — PROGRESS NOTES
Visit Vitals  /64   Pulse 65   Temp 99.2 °F (37.3 °C) (Oral)   Resp 12   Ht 5' 7\" (1.702 m)   Wt 227 lb (103 kg)   SpO2 96%   BMI 35.55 kg/m²     Amy Swartz presents today for   Chief Complaint   Patient presents with   [employer] Visit     former patient of Dr. Hannah Henley       Is someone accompanying this pt? no    Is the patient using any DME equipment during [address]? no    Depression Screening:  3 most recent PHQ Screens 2/6/2020   PHQ Not Done -   Little interest or pleasure in doing things Not at all   Feeling down, depressed, irritable, or hopeless Not at all   Total Score PHQ 2 0       Learning Assessment:  Learning Assessment 2/8/2018   PRIMARY LEARNER Patient   HIGHEST LEVEL OF EDUCATION - PRIMARY LEARNER  GRADUATED [address] PRIMARY LEARNER NONE   CO-LEARNER CAREGIVER No   PRIMARY LANGUAGE ENGLISH    NEED No   LEARNER PREFERENCE PRIMARY DEMONSTRATION   LEARNING SPECIAL TOPICS no   ANSWERED BY self   RELATIONSHIP SELF   ASSESSMENT COMMENT no       Abuse Screening:  Abuse Screening Questionnaire 3/7/2019   Do you ever feel afraid of your partner? N   Are you in a relationship with someone who physically or mentally threatens you? N   Is it safe for you to go home? Y       Fall Risk  Fall Risk Assessment, last 12 mths 2/6/2020   Able to walk? Yes   Fall in past 12 months? No   Number of falls in past 12 months -       Health Maintenance reviewed and discussed and ordered per Provider. Health Maintenance Due   Topic Date Due    Shingrix Vaccine Age 49> (1 of 2) 10/01/1993    GLAUCOMA SCREENING Q2Y  10/01/2008           Coordination of Care:  1. Have you been to the ER, urgent care clinic since your last visit? Hospitalized since your last visit? no    2. Have you seen or consulted any other health care providers outside of the [address] [place] [address] since your last visit? Include any pap smears or colon screening.  no

## 2020-02-06 NOTE — PATIENT INSTRUCTIONS
Medicare Wellness Visit, Female The best way to live healthy is to have a lifestyle where you eat a well-balanced diet, exercise regularly, limit alcohol use, and quit all forms of tobacco/nicotine, if applicable. Regular preventive services are another way to keep healthy. Preventive services (vaccines, screening tests, monitoring & exams) can help personalize your care plan, which helps you manage your own care. Screening tests can find health problems at the earliest stages, when they are easiest to treat. [facility] follows the current, evidence-based guidelines published by the [facility] ([facility]) when recommending preventive services for our patients. Because we follow these guidelines, sometimes recommendations change over time as research supports it. (For example, mammograms used to be recommended annually. Even though Medicare will still pay for an annual mammogram, the newer guidelines recommend a mammogram every two years for women of average risk). Of course, you and your doctor may decide to screen more often for some diseases, based on your risk and your co-morbidities (chronic disease you are already diagnosed with). Preventive services for you include: - Medicare offers their members a free annual wellness visit, which is time for you and your primary care provider to discuss and plan for your preventive service needs. Take advantage of this benefit every year! 
-All adults over the age of 72 should receive the recommended pneumonia vaccines. Current USPSTF guidelines recommend a series of two vaccines for the best pneumonia protection.  
-All adults should have a flu vaccine yearly and a tetanus vaccine every 10 years.  
-All adults age 48 and older should receive the shingles vaccines (series of two vaccines). -All adults age 38-68 who are overweight should have a diabetes screening test once every three years. -All adults born between [birth year] and 1967 should be screened once for Hepatitis C. 
-Other screening tests and preventive services for persons with diabetes include: an eye exam to screen for diabetic retinopathy, a kidney function test, a foot exam, and stricter control over your cholesterol.  
-Cardiovascular screening for adults with routine risk involves an electrocardiogram (ECG) at intervals determined by your doctor.  
-Colorectal cancer screenings should be done for adults age 54-65 with no increased risk factors for colorectal cancer. There are a number of acceptable methods of screening for this type of cancer. Each test has its own benefits and drawbacks. Discuss with your doctor what is most appropriate for you during your annual wellness visit. The different tests include: colonoscopy (considered the best screening method), a fecal occult blood test, a fecal DNA test, and sigmoidoscopy. 
 
-A bone mass density test is recommended when a woman turns 65 to screen for osteoporosis. This test is only recommended one time, as a screening. Some providers will use this same test as a disease monitoring tool if you already have osteoporosis. -Breast cancer screenings are recommended every other year for women of normal risk, age 54-69. 
-Cervical cancer screenings for women over age 72 are only recommended with certain risk factors. Here is a list of your current Health Maintenance items (your personalized list of preventive services) with a due date: 
Health Maintenance Due Topic Date Due  Shingles Vaccine (1 of 2) 10/01/1993  Glaucoma Screening   10/01/2008

## 2020-02-06 NOTE — PROGRESS NOTES
This is an Initial Medicare Annual Wellness Exam (AWV) (Performed 12 months after IPPE or effective date of Medicare Part B enrollment, Once in a lifetime)    I have reviewed the patient's medical history in detail and updated the computerized patient record. History     Patient Active Problem List   Diagnosis Code    Benign essential hypertension I10    Osteoarthrosis involving multiple sites M15.9    Restless legs syndrome G25.81    Advance directive discussed with patient Z71.89    Severe obesity ([address] E66.01     Past Medical History:   Diagnosis Date    Benign essential hypertension     Ill-defined condition     sciatica    Macular degeneration     followed by Dr. Juan J Simpson involving multiple sites     Restless legs syndrome       Past Surgical History:   Procedure Laterality Date    HX BREAST BIOPSY      benign    HX  SECTION      HX HYSTERECTOMY      HX OOPHORECTOMY Right      Current Outpatient Medications   Medication Sig Dispense Refill    labetalol (NORMODYNE) 100 mg tablet TAKE 1 TABLET BY MOUTH TWICE DAILY 60 Tab 3    hydroCHLOROthiazide (HYDRODIURIL) 25 mg tablet TAKE 1 TABLET BY MOUTH ONCE DAILY IN THE MORNING 30 Tab 2    acetaminophen (TYLENOL ARTHRITIS PAIN) 650 mg TbER Take 650 mg by mouth every eight (8) hours.  docusate sodium (COLACE) 100 mg capsule Take 200 mg by mouth daily.  cetirizine (ZYRTEC) 10 mg tablet Take 10 mg by mouth daily.  GLUCOSAMINE/CHONDR CROWE A SOD (OSTEO BI-FLEX PO) Take 2 Tabs by mouth daily.        No Known Allergies    Family History   Problem Relation Age of Onset    Heart Disease Mother     Diabetes Mother         in later life   [facility] Heart Disease Father     Hypertension Brother     Diabetes Maternal Aunt     Cancer Paternal Aunt         lung    Cancer Paternal Grandmother         breast and colon    Stroke Maternal Grandmother     Stroke Maternal Grandfather      Social History     Tobacco Use    Smoking status: Never Smoker    Smokeless tobacco: Never Used   Substance Use Topics    Alcohol use: No       Depression Risk Factor Screening:     3 most recent PHQ Screens 2/6/2020   PHQ Not Done -   Little interest or pleasure in doing things Not at all   Feeling down, depressed, irritable, or hopeless Not at all   Total Score PHQ 2 0       Alcohol Risk Factor Screening:   Do you average 1 drink per night or more than 7 drinks a week:  No    On any one occasion in the past three months have you have had more than 3 drinks containing alcohol:  No      Functional Ability and Level of Safety:   Hearing: Hearing is good. The patient wears hearing aids. Activities of Daily Living: The home contains: no safety equipment. Patient does total self care     Ambulation: with no difficulty    Fall Risk:  Fall Risk Assessment, last 12 mths 2/6/2020   Able to walk? Yes   Fall in past 12 months? No   Number of falls in past 12 months -       Abuse Screen:  Patient is not abused    Cognitive Screening   Has your family/caregiver stated any concerns about your memory: no  Cognitive Screening: Normal - Clock Drawing Test    Patient Care Team   Patient Care Team:  Aldo Collazo MD as PCP - General (Internal Medicine)  Aldo Collazo MD as PCP - [facility] Provider  Raisa Lara MD as Physician (Ophthalmology)  Claudine Leonard DPM as Physician (Podiatry)  Chiquita Bliss MD as Physician (Neurosurgery)    Assessment/Plan   Education and counseling provided:  Are appropriate based on today's review and evaluation    Diagnoses and all orders for this visit:    1. Initial Medicare annual wellness visit    2. Benign essential hypertension  -     CBC WITH AUTOMATED DIFF; Future  -     LIPID PANEL; Future  -     METABOLIC PANEL, COMPREHENSIVE; Future    3. Restless legs syndrome    4. Pure hypercholesterolemia  -     LIPID PANEL;  Future         Health Maintenance Due   Topic Date Due    Shingrix Vaccine Age 50> (1 of 2) 10/01/1993    GLAUCOMA SCREENING Q2Y  10/01/2008       Valentine Humphrey is a 66 y.o. female presenting today for Annual Wellness Visit (former patient of Dr. Marveen Homans)  . HPI:  Valentine Humphrey presents to the office today for annual wellness visit and routine follow-up care. She this is a previous patient of Dr. Netta Schilling and presents today to establish care with a new provider. She has a past medical history for hypertension, osteoarthritis, restless legs and obesity. She has a history of osteoarthritis bilateral knee and was see by Dr. Santana Heard and she was given \"Gel shots\". X 3 weeks and her bilateral knee pain has improved. She also has a history of sciatica and had a flare last week. She has previously been seen by Dr. Zhanna Brar and had medication she had not previously used which she took and relieved her pain. Review of Systems   Respiratory: Negative for cough and sputum production. Cardiovascular: Negative for chest pain and palpitations. Gastrointestinal: Negative for abdominal pain, nausea and vomiting. Neurological: Negative for dizziness and headaches. No Known Allergies    Current Outpatient Medications   Medication Sig Dispense Refill    labetalol (NORMODYNE) 100 mg tablet TAKE 1 TABLET BY MOUTH TWICE DAILY 60 Tab 3    hydroCHLOROthiazide (HYDRODIURIL) 25 mg tablet TAKE 1 TABLET BY MOUTH ONCE DAILY IN THE MORNING 30 Tab 2    acetaminophen (TYLENOL ARTHRITIS PAIN) 650 mg TbER Take 650 mg by mouth every eight (8) hours.  docusate sodium (COLACE) 100 mg capsule Take 200 mg by mouth daily.  cetirizine (ZYRTEC) 10 mg tablet Take 10 mg by mouth daily.  GLUCOSAMINE/CHONDR CROWE A SOD (OSTEO BI-FLEX PO) Take 2 Tabs by mouth daily.          Past Medical History:   Diagnosis Date    Benign essential hypertension     Ill-defined condition     sciatica    Macular degeneration 2015    followed by Dr. Viktor Montiel involving multiple sites     Restless legs syndrome        Past Surgical History:   Procedure Laterality Date    HX BREAST BIOPSY      benign    HX  SECTION      HX HYSTERECTOMY      HX OOPHORECTOMY Right        Social History     Socioeconomic History    Marital status:      Spouse name: Not on file    Number of children: Not on file    Years of education: Not on file    Highest education level: Not on file   Occupational History    Not on file   Social Needs    Financial resource strain: Not on file    Food insecurity:     Worry: Not on file     Inability: Not on file    Transportation needs:     Medical: Not on file     Non-medical: Not on file   Tobacco Use    Smoking status: Never Smoker    Smokeless tobacco: Never Used   Substance and Sexual Activity    Alcohol use: No    Drug use: No    Sexual activity: Yes     Partners: Male   Lifestyle    Physical activity:     Days per week: Not on file     Minutes per session: Not on file    Stress: Not on file   Relationships    Social connections:     Talks on phone: Not on file     Gets together: Not on file     Attends Anabaptism service: Not on file     Active member of club or organization: Not on file     Attends meetings of clubs or organizations: Not on file     Relationship status: Not on file    Intimate partner violence:     Fear of current or ex partner: Not on file     Emotionally abused: Not on file     Physically abused: Not on file     Forced sexual activity: Not on file   Other Topics Concern    Not on file   Social History Narrative    Not on file       Patient does not have an advanced directive on file    Vitals:    20 1405   BP: 138/64   Pulse: 65   Resp: 12   Temp: 99.2 °F (37.3 °C)   TempSrc: Oral   SpO2: 96%   Weight: 227 lb (103 kg)   Height: 5' 7\" (1.702 m)   PainSc:   0 - No pain       Physical Exam  Vitals signs and nursing note reviewed. Constitutional:       Appearance: Normal appearance.    HENT: Right Ear: Tympanic membrane normal.      Left Ear: Tympanic membrane normal.   Cardiovascular:      Rate and Rhythm: Normal rate and regular rhythm. Pulses: Normal pulses. Heart sounds: Normal heart sounds. Pulmonary:      Effort: Pulmonary effort is normal.      Breath sounds: Normal breath sounds. Abdominal:      General: Bowel sounds are normal.      Palpations: Abdomen is soft. Musculoskeletal:         General: No swelling or tenderness. Skin:     General: Skin is warm and dry. Neurological:      Mental Status: She is alert. Hospital Outpatient Visit on 11/21/2019   Component Date Value Ref Range Status    WBC 11/21/2019 7.2  4.6 - 13.2 K/uL Final    RBC 11/21/2019 4.51  4.20 - 5.30 M/uL Final    HGB 11/21/2019 13.4  12.0 - 16.0 g/dL Final    HCT 11/21/2019 42.4  35.0 - 45.0 % Final    MCV 11/21/2019 94.0  74.0 - 97.0 FL Final    MCH 11/21/2019 29.7  24.0 - 34.0 PG Final    MCHC 11/21/2019 31.6  31.0 - 37.0 g/dL Final    RDW 11/21/2019 13.9  11.6 - 14.5 % Final    PLATELET 07/93/6635 689  135 - 420 K/uL Final    MPV 11/21/2019 11.2  9.2 - 11.8 FL Final    NEUTROPHILS 11/21/2019 66  40 - 73 % Final    LYMPHOCYTES 11/21/2019 22  21 - 52 % Final    MONOCYTES 11/21/2019 8  3 - 10 % Final    EOSINOPHILS 11/21/2019 4  0 - 5 % Final    BASOPHILS 11/21/2019 0  0 - 2 % Final    ABS. NEUTROPHILS 11/21/2019 4.8  1.8 - 8.0 K/UL Final    ABS. LYMPHOCYTES 11/21/2019 1.6  0.9 - 3.6 K/UL Final    ABS. MONOCYTES 11/21/2019 0.6  0.05 - 1.2 K/UL Final    ABS. EOSINOPHILS 11/21/2019 0.3  0.0 - 0.4 K/UL Final    ABS.  BASOPHILS 11/21/2019 0.0  0.0 - 0.1 K/UL Final    DF 11/21/2019 AUTOMATED    Final    Sodium 11/21/2019 143  136 - 145 mmol/L Final    Potassium 11/21/2019 4.1  3.5 - 5.5 mmol/L Final    Chloride 11/21/2019 109  100 - 111 mmol/L Final    CO2 11/21/2019 30  21 - 32 mmol/L Final    Anion gap 11/21/2019 4  3.0 - 18 mmol/L Final    Glucose 11/21/2019 88  74 - 99 mg/dL Final    BUN 11/21/2019 18  7.0 - 18 MG/DL Final    Creatinine 11/21/2019 0.77  0.6 - 1.3 MG/DL Final    BUN/Creatinine ratio 11/21/2019 23* 12 - 20   Final    GFR est AA 11/21/2019 >60  >60 ml/min/1.73m2 Final    GFR est non-AA 11/21/2019 >60  >60 ml/min/1.73m2 Final    Comment: (NOTE)  Estimated GFR is calculated using the Modification of Diet in Renal   Disease (MDRD) Study equation, reported for both  Americans   (GFRAA) and non- Americans (GFRNA), and normalized to 1.73m2   body surface area. The physician must decide which value applies to   the patient. The MDRD study equation should only be used in   individuals age 25 or older. It has not been validated for the   following: pregnant women, patients with serious comorbid conditions,   or on certain medications, or persons with extremes of body size,   muscle mass, or nutritional status.  Calcium 11/21/2019 8.9  8.5 - 10.1 MG/DL Final    Bilirubin, total 11/21/2019 0.6  0.2 - 1.0 MG/DL Final    ALT (SGPT) 11/21/2019 27  13 - 56 U/L Final    AST (SGOT) 11/21/2019 15  10 - 38 U/L Final    Alk. phosphatase 11/21/2019 82  45 - 117 U/L Final    Protein, total 11/21/2019 6.8  6.4 - 8.2 g/dL Final    Albumin 11/21/2019 3.9  3.4 - 5.0 g/dL Final    Globulin 11/21/2019 2.9  2.0 - 4.0 g/dL Final    A-G Ratio 11/21/2019 1.3  0.8 - 1.7   Final    LIPID PROFILE 11/21/2019        Final    Cholesterol, total 11/21/2019 185  <200 MG/DL Final    Triglyceride 11/21/2019 65  <150 MG/DL Final    Comment: The drugs N-acetylcysteine (NAC) and  Metamiszole have been found to cause falsely  low results in this chemical assay. Please  be sure to submit blood samples obtained  BEFORE administration of either of these  drugs to assure correct results.       HDL Cholesterol 11/21/2019 52  40 - 60 MG/DL Final    LDL, calculated 11/21/2019 120* 0 - 100 MG/DL Final    VLDL, calculated 11/21/2019 13  MG/DL Final    CHOL/HDL Ratio 11/21/2019 3.6  0 - 5.0 Final       .No results found for any visits on 02/06/20. Assessment / Plan:      ICD-10-CM ICD-9-CM    1. Initial Medicare annual wellness visit Z00.00 V70.0    2. Benign essential hypertension I10 401.1 CBC WITH AUTOMATED DIFF      LIPID PANEL      METABOLIC PANEL, COMPREHENSIVE   3. Restless legs syndrome G25.81 333.94    4. Pure hypercholesterolemia E78.00 272.0 LIPID PANEL     HTN- controlled. No change to current treatment plan    Follow-up and Dispositions    · Return in about 6 months (around 8/6/2020). I asked the patient if she  had any questions and answered her  questions. The patient stated that she understands the treatment plan and agrees with the treatment plan    This document was created with a voice activated dictation system and may contain transcription errors.

## 2020-04-14 NOTE — TELEPHONE ENCOUNTER
Requested Prescriptions     Pending Prescriptions Disp Refills    labetaloL (NORMODYNE) 100 mg tablet 60 Tab 3     Sig: TAKE 1 TABLET BY MOUTH TWICE DAILY

## 2020-04-15 RX ORDER — LABETALOL 100 MG/1
TABLET, FILM COATED ORAL
Qty: 60 TAB | Refills: 3 | Status: SHIPPED | OUTPATIENT
Start: 2020-04-15 | End: 2020-08-07 | Stop reason: SDUPTHER

## 2020-08-07 ENCOUNTER — VIRTUAL VISIT (OUTPATIENT)
Dept: FAMILY MEDICINE CLINIC | Facility: CLINIC | Age: 75
End: 2020-08-07

## 2020-08-07 DIAGNOSIS — M15.9 OSTEOARTHRITIS OF MULTIPLE JOINTS, UNSPECIFIED OSTEOARTHRITIS TYPE: ICD-10-CM

## 2020-08-07 DIAGNOSIS — I10 BENIGN ESSENTIAL HYPERTENSION: Primary | ICD-10-CM

## 2020-08-07 RX ORDER — LABETALOL 100 MG/1
TABLET, FILM COATED ORAL
Qty: 180 TAB | Refills: 1 | Status: SHIPPED | OUTPATIENT
Start: 2020-08-07 | End: 2021-02-25

## 2020-08-07 NOTE — PROGRESS NOTES
Harjeet Sanders is a 66 y.o. female who was seen by synchronous (real-time) audio-video technology on 8/7/2020 for Hypertension        Assessment & Plan:   Diagnoses and all orders for this visit:    1. Benign essential hypertension    2. Osteoarthritis of multiple joints, unspecified osteoarthritis type    Other orders  -     labetaloL (NORMODYNE) 100 mg tablet; TAKE 1 TABLET BY MOUTH TWICE DAILY          Subjective: The patient presents for an Audio-visual teleconference appointment for hypertension follow-up care. Per the patient she feels well today. She denies any chest pain, palpitation, or lower extremity edema. Patient notes she did not get her fasting labs completed because she did not know where to go to get them completed. Her last blood work was in November, 2019. She notes she is compliant with taking her medication daily. Her initial BP today is 163/86 and repeat BP is 149/78 P/68. She also has a history of chronic bilateral knee pain. She had gel inserted in bilateral knee in November, 2019 and the pain has improved. She has know pain with sitting or laying but if she goes from the sitting to the standing position her knee hurt \" until she can get going\". Prior to Admission medications    Medication Sig Start Date End Date Taking? Authorizing Provider   labetaloL (NORMODYNE) 100 mg tablet TAKE 1 TABLET BY MOUTH TWICE DAILY 8/7/20  Yes Leonid Hernández NP   hydroCHLOROthiazide (HYDRODIURIL) 25 mg tablet TAKE 1 TABLET BY MOUTH ONCE DAILY IN THE MORNING 7/13/20   Leonid Hernández NP   labetaloL (NORMODYNE) 100 mg tablet TAKE 1 TABLET BY MOUTH TWICE DAILY 4/15/20 8/7/20  Leonid Hernández NP   acetaminophen (TYLENOL ARTHRITIS PAIN) 650 mg TbER Take 650 mg by mouth every eight (8) hours. Provider, Historical   docusate sodium (COLACE) 100 mg capsule Take 200 mg by mouth daily. Provider, Historical   cetirizine (ZYRTEC) 10 mg tablet Take 10 mg by mouth daily. Provider, Historical   GLUCOSAMINE/CHONDR CROWE A SOD (OSTEO BI-FLEX PO) Take 2 Tabs by mouth daily. Provider, Historical     Patient Active Problem List   Diagnosis Code    Benign essential hypertension I10    Osteoarthrosis involving multiple sites M15.9    Restless legs syndrome G25.81    Advance directive discussed with patient Z71.89    Severe obesity ([facility] [address] E66.01     Patient Active Problem List    Diagnosis Date Noted    Severe obesity ([facility] [address] 03/10/2019    Advance directive discussed with patient 09/14/2017    Benign essential hypertension     Osteoarthrosis involving multiple sites     Restless legs syndrome      Current Outpatient Medications   Medication Sig Dispense Refill    labetaloL (NORMODYNE) 100 mg tablet TAKE 1 TABLET BY MOUTH TWICE DAILY 180 Tab 1    hydroCHLOROthiazide (HYDRODIURIL) 25 mg tablet TAKE 1 TABLET BY MOUTH ONCE DAILY IN THE MORNING 90 Tab 0    acetaminophen (TYLENOL ARTHRITIS PAIN) 650 mg TbER Take 650 mg by mouth every eight (8) hours.  docusate sodium (COLACE) 100 mg capsule Take 200 mg by mouth daily.  cetirizine (ZYRTEC) 10 mg tablet Take 10 mg by mouth daily.  GLUCOSAMINE/CHONDR CROWE A SOD (OSTEO BI-FLEX PO) Take 2 Tabs by mouth daily. No Known Allergies  Past Medical History:   Diagnosis Date    Benign essential hypertension     Ill-defined condition     sciatica    Macular degeneration 2015    followed by Dr. Arnol Wong involving multiple sites     Restless legs syndrome        ROS    Constitutional: No apparent distress noted  General- negative for fever, chills or fatigue  Eyes- negative visual changes  CV- denies chest pain, palpitation  Pul: negative cough or SOB  GI: negative nausea, flank pain, diarrhea, constipation  Urinary:- No dysuria or polyuria  MS- bilateral knee pain  Neuro- negative headache, dizziness or weakness  Skin- negative for rashes or lesions.   Psych- denies any anxiety or depression    Objective:   No flowsheet data found. [INSTRUCTIONS:  \"[x]\" Indicates a positive item  \"[]\" Indicates a negative item  -- DELETE ALL ITEMS NOT EXAMINED]    Constitutional: [x] Appears well-developed and well-nourished [x] No apparent distress      [] Abnormal -     Mental status: [x] Alert and awake  [x] Oriented to person/place/time [x] Able to follow commands    [] Abnormal -     Eyes:   EOM    [x]  Normal    [] Abnormal -   Sclera  [x]  Normal    [] Abnormal -          Discharge [x]  None visible   [] Abnormal -     HENT: [x] Normocephalic, atraumatic  [] Abnormal -   [x] Mouth/Throat: Mucous membranes are moist    External Ears [x] Normal  [] Abnormal -    Neck: [x] No visualized mass [] Abnormal -     Pulmonary/Chest: [x] Respiratory effort normal   [x] No visualized signs of difficulty breathing or respiratory distress        [] Abnormal -      Musculoskeletal:   [x] Normal gait with no signs of ataxia         [x] Normal range of motion of neck        [] Abnormal -     Neurological:        [x] No Facial Asymmetry (Cranial nerve 7 motor function) (limited exam due to video visit)          [x] No gaze palsy        [] Abnormal -          Skin:        [x] No significant exanthematous lesions or discoloration noted on facial skin         [] Abnormal -            Psychiatric:       [x] Normal Affect [] Abnormal -        [x] No Hallucinations    Other pertinent observable physical exam findings:-        We discussed the expected course, resolution and complications of the diagnosis(es) in detail. Medication risks, benefits, costs, interactions, and alternatives were discussed as indicated. I advised her to contact the office if her condition worsens, changes or fails to improve as anticipated. She expressed understanding with the diagnosis(es) and plan.        Sharita Aliviachey, who was evaluated through a patient-initiated, synchronous (real-time) audio-video encounter, and/or her healthcare decision maker, is aware that it is a billable service, with coverage as determined by her insurance carrier. She provided verbal consent to proceed: Yes, and patient identification was verified. It was conducted pursuant to the emergency declaration under the [address] [place] [address] authority and the [employer] and [employer] General Act. A caregiver was present when appropriate. Ability to conduct physical exam was limited. I was at home. The patient was at home.       James Edmonds NP

## 2020-10-25 RX ORDER — HYDROCHLOROTHIAZIDE 25 MG/1
TABLET ORAL
Qty: 90 TAB | Refills: 0 | Status: SHIPPED | OUTPATIENT
Start: 2020-10-25 | End: 2021-01-22

## 2021-01-22 RX ORDER — HYDROCHLOROTHIAZIDE 25 MG/1
TABLET ORAL
Qty: 90 TAB | Refills: 0 | Status: SHIPPED | OUTPATIENT
Start: 2021-01-22 | End: 2021-05-12

## 2021-02-25 RX ORDER — LABETALOL 100 MG/1
TABLET, FILM COATED ORAL
Qty: 180 TAB | Refills: 0 | Status: SHIPPED | OUTPATIENT
Start: 2021-02-25 | End: 2021-06-03

## 2021-04-23 ENCOUNTER — HOSPITAL ENCOUNTER (OUTPATIENT)
Dept: LAB | Age: 76
Discharge: HOME | End: 2021-04-23
Payer: MEDICARE

## 2021-04-23 DIAGNOSIS — I10 BENIGN ESSENTIAL HYPERTENSION: ICD-10-CM

## 2021-04-23 LAB
ALBUMIN SERPL-MCNC: 3.9 G/DL (ref 3.4–5)
ALBUMIN/GLOB SERPL: 1.3 {RATIO} (ref 0.8–1.7)
ALP SERPL-CCNC: 87 U/L (ref 45–117)
ALT SERPL-CCNC: 21 U/L (ref 13–56)
ANION GAP SERPL CALC-SCNC: 3 MMOL/L (ref 3–18)
AST SERPL-CCNC: 10 U/L (ref 10–38)
BASOPHILS # BLD: 0.1 K/UL (ref 0–0.1)
BASOPHILS NFR BLD: 1 % (ref 0–2)
BILIRUB SERPL-MCNC: 1.1 MG/DL (ref 0.2–1)
BUN SERPL-MCNC: 23 MG/DL (ref 7–18)
BUN/CREAT SERPL: 30 (ref 12–20)
CALCIUM SERPL-MCNC: 9.1 MG/DL (ref 8.5–10.1)
CHLORIDE SERPL-SCNC: 105 MMOL/L (ref 100–111)
CHOLEST SERPL-MCNC: 187 MG/DL
CO2 SERPL-SCNC: 31 MMOL/L (ref 21–32)
CREAT SERPL-MCNC: 0.76 MG/DL (ref 0.6–1.3)
DIFFERENTIAL METHOD BLD: NORMAL
EOSINOPHIL # BLD: 0.3 K/UL (ref 0–0.4)
EOSINOPHIL NFR BLD: 4 % (ref 0–5)
ERYTHROCYTE [DISTWIDTH] IN BLOOD BY AUTOMATED COUNT: 13.4 % (ref 11.6–14.5)
GLOBULIN SER CALC-MCNC: 3 G/DL (ref 2–4)
GLUCOSE SERPL-MCNC: 108 MG/DL (ref 74–99)
HCT VFR BLD AUTO: 44.1 % (ref 35–45)
HDLC SERPL-MCNC: 57 MG/DL (ref 40–60)
HDLC SERPL: 3.3 {RATIO} (ref 0–5)
HGB BLD-MCNC: 14 G/DL (ref 12–16)
LDLC SERPL CALC-MCNC: 113 MG/DL (ref 0–100)
LIPID PROFILE,FLP: ABNORMAL
LYMPHOCYTES # BLD: 1.7 K/UL (ref 0.9–3.6)
LYMPHOCYTES NFR BLD: 22 % (ref 21–52)
MCH RBC QN AUTO: 29.2 PG (ref 24–34)
MCHC RBC AUTO-ENTMCNC: 31.7 G/DL (ref 31–37)
MCV RBC AUTO: 92.1 FL (ref 74–97)
MONOCYTES # BLD: 0.6 K/UL (ref 0.05–1.2)
MONOCYTES NFR BLD: 8 % (ref 3–10)
NEUTS SEG # BLD: 5.1 K/UL (ref 1.8–8)
NEUTS SEG NFR BLD: 66 % (ref 40–73)
PLATELET # BLD AUTO: 266 K/UL (ref 135–420)
PMV BLD AUTO: 11.2 FL (ref 9.2–11.8)
POTASSIUM SERPL-SCNC: 4.8 MMOL/L (ref 3.5–5.5)
PROT SERPL-MCNC: 6.9 G/DL (ref 6.4–8.2)
RBC # BLD AUTO: 4.79 M/UL (ref 4.2–5.3)
SODIUM SERPL-SCNC: 139 MMOL/L (ref 136–145)
TRIGL SERPL-MCNC: 85 MG/DL (ref ?–150)
VLDLC SERPL CALC-MCNC: 17 MG/DL
WBC # BLD AUTO: 7.8 K/UL (ref 4.6–13.2)

## 2021-04-23 PROCEDURE — 36415 COLL VENOUS BLD VENIPUNCTURE: CPT

## 2021-04-23 PROCEDURE — 80061 LIPID PANEL: CPT

## 2021-04-23 PROCEDURE — 80053 COMPREHEN METABOLIC PANEL: CPT

## 2021-04-23 PROCEDURE — 85025 COMPLETE CBC W/AUTO DIFF WBC: CPT

## 2021-05-12 RX ORDER — HYDROCHLOROTHIAZIDE 25 MG/1
TABLET ORAL
Qty: 90 TAB | Refills: 0 | Status: SHIPPED | OUTPATIENT
Start: 2021-05-12 | End: 2021-08-09

## 2021-05-18 ENCOUNTER — OFFICE VISIT (OUTPATIENT)
Dept: FAMILY MEDICINE CLINIC | Age: 76
End: 2021-05-18
Payer: MEDICARE

## 2021-05-18 VITALS
RESPIRATION RATE: 12 BRPM | DIASTOLIC BLOOD PRESSURE: 53 MMHG | WEIGHT: 238.2 LBS | HEIGHT: 67 IN | TEMPERATURE: 98.8 F | OXYGEN SATURATION: 95 % | HEART RATE: 70 BPM | BODY MASS INDEX: 37.39 KG/M2 | SYSTOLIC BLOOD PRESSURE: 126 MMHG

## 2021-05-18 DIAGNOSIS — M15.9 OSTEOARTHRITIS OF MULTIPLE JOINTS, UNSPECIFIED OSTEOARTHRITIS TYPE: ICD-10-CM

## 2021-05-18 DIAGNOSIS — E66.01 SEVERE OBESITY (HCC): ICD-10-CM

## 2021-05-18 DIAGNOSIS — I10 BENIGN ESSENTIAL HYPERTENSION: ICD-10-CM

## 2021-05-18 DIAGNOSIS — Z13.39 SCREENING FOR ALCOHOLISM: ICD-10-CM

## 2021-05-18 DIAGNOSIS — Z00.00 MEDICARE ANNUAL WELLNESS VISIT, SUBSEQUENT: Primary | ICD-10-CM

## 2021-05-18 PROCEDURE — G8427 DOCREV CUR MEDS BY ELIG CLIN: HCPCS | Performed by: NURSE PRACTITIONER

## 2021-05-18 PROCEDURE — G8752 SYS BP LESS 140: HCPCS | Performed by: NURSE PRACTITIONER

## 2021-05-18 PROCEDURE — 1090F PRES/ABSN URINE INCON ASSESS: CPT | Performed by: NURSE PRACTITIONER

## 2021-05-18 PROCEDURE — 99213 OFFICE O/P EST LOW 20 MIN: CPT | Performed by: NURSE PRACTITIONER

## 2021-05-18 PROCEDURE — G8432 DEP SCR NOT DOC, RNG: HCPCS | Performed by: NURSE PRACTITIONER

## 2021-05-18 PROCEDURE — G0439 PPPS, SUBSEQ VISIT: HCPCS | Performed by: NURSE PRACTITIONER

## 2021-05-18 PROCEDURE — G8536 NO DOC ELDER MAL SCRN: HCPCS | Performed by: NURSE PRACTITIONER

## 2021-05-18 PROCEDURE — G8417 CALC BMI ABV UP PARAM F/U: HCPCS | Performed by: NURSE PRACTITIONER

## 2021-05-18 PROCEDURE — 1101F PT FALLS ASSESS-DOCD LE1/YR: CPT | Performed by: NURSE PRACTITIONER

## 2021-05-18 PROCEDURE — G8754 DIAS BP LESS 90: HCPCS | Performed by: NURSE PRACTITIONER

## 2021-05-18 PROCEDURE — G8399 PT W/DXA RESULTS DOCUMENT: HCPCS | Performed by: NURSE PRACTITIONER

## 2021-05-18 NOTE — PROGRESS NOTES
Patrice Franco is a 66 y.o. female presenting today for Follow-up (check up)    Chief Complaint   Patient presents with    Follow-up     check up       HPI:  Patrice Fracno presents to the office today for hypertension follow-up care. BP controlled today. Negative for chest pain or palpitation. Compliant with the treatment plan. Fasting labs completed prior to today's visit. She is complaining of bilateral knee pain. She has difficulty ambulating. At this point the right knee is worse than the left. At this point she does not want a referral. She notes she would like to get a referrall after completing some home improvement    ROS    ROS:  History obtained from the patient intake forms which are reviewed with the patient  · General: negative for - chills, fever, weight changes or malaise  · HEENT: no sore throat, nasal congestion, vision problems or ear problems  · Respiratory: no cough, shortness of breath, or wheezing  · Cardiovascular: no chest pain, palpitations, or dyspnea on exertion  · Gastrointestinal: no abdominal pain, N/V, change in bowel habits, or black or bloody stools  · Musculoskeletal: no back pain, joint pain, joint stiffness, muscle pain or muscle weakness  · Neurological: no numbness, tingling, headache or dizziness  · Endo:  No polyuria or polydipsia. · : no hematuria, dysuria, frequency, hesitancy, or nocturia.     · Psychological: negative for - anxiety, depression, sleep disturbances, suicidal or homicidal ideations    No Known Allergies    PHQ Screening   3 most recent PHQ Screens 5/18/2021   PHQ Not Done -   Little interest or pleasure in doing things Not at all   Feeling down, depressed, irritable, or hopeless Not at all   Total Score PHQ 2 0       History  Past Medical History:   Diagnosis Date    Benign essential hypertension     Ill-defined condition     sciatica    Macular degeneration 2015    followed by Dr. Rachel Wilcox involving multiple sites     Restless legs syndrome        Past Surgical History:   Procedure Laterality Date    HX BREAST BIOPSY      benign    HX  SECTION      HX HYSTERECTOMY      HX OOPHORECTOMY Right        Social History     Socioeconomic History    Marital status:      Spouse name: Not on file    Number of children: Not on file    Years of education: Not on file    Highest education level: Not on file   Occupational History    Not on file   Tobacco Use    Smoking status: Never Smoker    Smokeless tobacco: Never Used   Substance and Sexual Activity    Alcohol use: No    Drug use: No    Sexual activity: Yes     Partners: Male   Other Topics Concern    Not on file   Social History Narrative    Not on file     Social Determinants of Health     Financial Resource Strain:     Difficulty of Paying Living Expenses:    Food Insecurity:     Worried About Running Out of Food in the Last Year:     Ran Out of Food in the Last Year:    Transportation Needs:     Lack of Transportation (Medical):      Lack of Transportation (Non-Medical):    Physical Activity:     Days of Exercise per Week:     Minutes of Exercise per Session:    Stress:     Feeling of Stress :    Social Connections:     Frequency of Communication with Friends and Family:     Frequency of Social Gatherings with Friends and Family:     Attends Anabaptist Services:     Active Member of Clubs or Organizations:     Attends Club or Organization Meetings:     Marital Status:    Intimate Partner Violence:     Fear of Current or Ex-Partner:     Emotionally Abused:     Physically Abused:     Sexually Abused:        Current Outpatient Medications   Medication Sig Dispense Refill    hydroCHLOROthiazide (HYDRODIURIL) 25 mg tablet TAKE 1 TABLET BY MOUTH ONCE DAILY IN THE MORNING 90 Tab 0    labetaloL (NORMODYNE) 100 mg tablet Take 1 tablet by mouth twice daily 180 Tab 0    acetaminophen (TYLENOL ARTHRITIS PAIN) 650 mg TbER Take 650 mg by mouth every eight (8) hours.  docusate sodium (COLACE) 100 mg capsule Take 200 mg by mouth daily.  GLUCOSAMINE/CHONDR CROWE A SOD (OSTEO BI-FLEX PO) Take 2 Tabs by mouth daily.  cetirizine (ZYRTEC) 10 mg tablet Take 10 mg by mouth daily. Vitals:    05/18/21 1321   BP: (!) 126/53   Pulse: 70   Resp: 12   Temp: 98.8 °F (37.1 °C)   TempSrc: Oral   SpO2: 95%   Weight: 238 lb 3.2 oz (108 kg)   Height: 5' 7\" (1.702 m)   PainSc:   0 - No pain       Physical Exam  Vitals signs and nursing note reviewed. Constitutional:       Appearance: She is obese. Cardiovascular:      Rate and Rhythm: Normal rate and regular rhythm. Pulses: Normal pulses. Heart sounds: Normal heart sounds. Pulmonary:      Effort: Pulmonary effort is normal.      Breath sounds: Normal breath sounds. Abdominal:      General: Bowel sounds are normal.      Palpations: Abdomen is soft. Musculoskeletal:      Comments: Trace of swelling bilateral ankles   Skin:     General: Skin is warm and dry. Neurological:      Mental Status: She is alert. Hospital Outpatient Visit on 04/23/2021   Component Date Value Ref Range Status    LIPID PROFILE 04/23/2021        Final    Cholesterol, total 04/23/2021 187  <200 MG/DL Final    Triglyceride 04/23/2021 85  <150 MG/DL Final    Comment: The drugs N-acetylcysteine (NAC) and  Metamiszole have been found to cause falsely  low results in this chemical assay. Please  be sure to submit blood samples obtained  BEFORE administration of either of these  drugs to assure correct results.       HDL Cholesterol 04/23/2021 57  40 - 60 MG/DL Final    LDL, calculated 04/23/2021 113* 0 - 100 MG/DL Final    VLDL, calculated 04/23/2021 17  MG/DL Final    CHOL/HDL Ratio 04/23/2021 3.3  0 - 5.0   Final    Sodium 04/23/2021 139  136 - 145 mmol/L Final    Potassium 04/23/2021 4.8  3.5 - 5.5 mmol/L Final    Chloride 04/23/2021 105  100 - 111 mmol/L Final    CO2 04/23/2021 31  21 - 32 mmol/L Final    Anion gap 04/23/2021 3  3.0 - 18 mmol/L Final    Glucose 04/23/2021 108* 74 - 99 mg/dL Final    BUN 04/23/2021 23* 7.0 - 18 MG/DL Final    Creatinine 04/23/2021 0.76  0.6 - 1.3 MG/DL Final    BUN/Creatinine ratio 04/23/2021 30* 12 - 20   Final    GFR est AA 04/23/2021 >60  >60 ml/min/1.73m2 Final    GFR est non-AA 04/23/2021 >60  >60 ml/min/1.73m2 Final    Comment: (NOTE)  Estimated GFR is calculated using the Modification of Diet in Renal   Disease (MDRD) Study equation, reported for both  Americans   (GFRAA) and non- Americans (GFRNA), and normalized to 1.73m2   body surface area. The physician must decide which value applies to   the patient. The MDRD study equation should only be used in   individuals age 25 or older. It has not been validated for the   following: pregnant women, patients with serious comorbid conditions,   or on certain medications, or persons with extremes of body size,   muscle mass, or nutritional status.  Calcium 04/23/2021 9.1  8.5 - 10.1 MG/DL Final    Bilirubin, total 04/23/2021 1.1* 0.2 - 1.0 MG/DL Final    ALT (SGPT) 04/23/2021 21  13 - 56 U/L Final    AST (SGOT) 04/23/2021 10  10 - 38 U/L Final    Alk.  phosphatase 04/23/2021 87  45 - 117 U/L Final    Protein, total 04/23/2021 6.9  6.4 - 8.2 g/dL Final    Albumin 04/23/2021 3.9  3.4 - 5.0 g/dL Final    Globulin 04/23/2021 3.0  2.0 - 4.0 g/dL Final    A-G Ratio 04/23/2021 1.3  0.8 - 1.7   Final    WBC 04/23/2021 7.8  4.6 - 13.2 K/uL Final    RBC 04/23/2021 4.79  4.20 - 5.30 M/uL Final    HGB 04/23/2021 14.0  12.0 - 16.0 g/dL Final    HCT 04/23/2021 44.1  35.0 - 45.0 % Final    MCV 04/23/2021 92.1  74.0 - 97.0 FL Final    MCH 04/23/2021 29.2  24.0 - 34.0 PG Final    MCHC 04/23/2021 31.7  31.0 - 37.0 g/dL Final    RDW 04/23/2021 13.4  11.6 - 14.5 % Final    PLATELET 05/90/0538 658  135 - 420 K/uL Final    MPV 04/23/2021 11.2  9.2 - 11.8 FL Final    NEUTROPHILS 04/23/2021 66  40 - 73 % Final    LYMPHOCYTES 04/23/2021 22  21 - 52 % Final    MONOCYTES 04/23/2021 8  3 - 10 % Final    EOSINOPHILS 04/23/2021 4  0 - 5 % Final    BASOPHILS 04/23/2021 1  0 - 2 % Final    ABS. NEUTROPHILS 04/23/2021 5.1  1.8 - 8.0 K/UL Final    ABS. LYMPHOCYTES 04/23/2021 1.7  0.9 - 3.6 K/UL Final    ABS. MONOCYTES 04/23/2021 0.6  0.05 - 1.2 K/UL Final    ABS. EOSINOPHILS 04/23/2021 0.3  0.0 - 0.4 K/UL Final    ABS. BASOPHILS 04/23/2021 0.1  0.0 - 0.1 K/UL Final    DF 04/23/2021 AUTOMATED    Final       No results found for any visits on 05/18/21. Patient Care Team:  Patient Care Team:  Sierra Fagan NP as PCP - General (Nurse Practitioner)  Sierra Fagan NP as PCP - [facility] Provider  Enzo Pritchard MD as Physician (Ophthalmology)  Silvestre Mckee DPM as Physician (Podiatry)  Ekaterina Baptiste MD as Physician (Neurosurgery)      Assessment / Plan:      ICD-10-CM ICD-9-CM    1. Medicare annual wellness visit, subsequent  Z00.00 V70.0    2. Severe obesity ([address]  E66.01 278.01    3. Benign essential hypertension  I10 401.1    4. Osteoarthritis of multiple joints, unspecified osteoarthritis type  M15.9 715.89    5. Screening for alcoholism  Z13.39 V79.1      Hypertension-continue current treatment plan. Negative for side effects or adverse reactions to the prescription prescribed. Chronic knee pain-no referrals placed this visit. Follow-up and Dispositions    · Return in about 6 months (around 11/18/2021). I asked the patient if she  had any questions and answered her  questions. The patient stated that she understands the treatment plan and agrees with the treatment plan    This document was created with a voice activated dictation system and may contain transcription errors.

## 2021-05-18 NOTE — PROGRESS NOTES
This is the Subsequent Medicare Annual Wellness Exam, performed 12 months or more after the Initial AWV or the last Subsequent AWV    I have reviewed the patient's medical history in detail and updated the computerized patient record. Assessment/Plan   Education and counseling provided:  Are appropriate based on today's review and evaluation    1. Benign essential hypertension  2. Severe obesity ([address]  3. Osteoarthritis of multiple joints, unspecified osteoarthritis type  4. Medicare annual wellness visit, subsequent  5. Screening for alcoholism       Depression Risk Factor Screening     3 most recent PHQ Screens 5/18/2021   PHQ Not Done -   Little interest or pleasure in doing things Not at all   Feeling down, depressed, irritable, or hopeless Not at all   Total Score PHQ 2 0       Alcohol Risk Screen    Do you average more than 1 drink per night or more than 7 drinks a week:  No    On any one occasion in the past three months have you have had more than 3 drinks containing alcohol:  No        Functional Ability and Level of Safety    Hearing: Hearing is good. The patient wears hearing aids. Activities of Daily Living: The home contains: grab bars  Patient does total self care      Ambulation: with no difficulty     Fall Risk:  Fall Risk Assessment, last 12 mths 5/18/2021   Able to walk? Yes   Fall in past 12 months?  0   Number of falls in past 12 months -      Abuse Screen:  Patient is not abused       Cognitive Screening    Has your family/caregiver stated any concerns about your memory: no     Cognitive Screening: Normal - Clock Drawing Test    Health Maintenance Due     Health Maintenance Due   Topic Date Due    Shingrix Vaccine Age 49> (1 of 2) Never done       Patient Care Team   Patient Care Team:  Melida Chacon NP as PCP - General (Nurse Practitioner)  Melida Chacon NP as PCP - [facility] Khadra Riberaled Provider  Hector Hagan MD as Physician (Ophthalmology)  Jim Holly Maharaj DPM as Physician (Podiatry)  Gustavo Ordaz MD as Physician (Neurosurgery)    History     Patient Active Problem List   Diagnosis Code    Benign essential hypertension I10    Osteoarthrosis involving multiple sites M15.9    Restless legs syndrome G25.81    Advance directive discussed with patient Z71.89    Severe obesity ([address] E66.01     Past Medical History:   Diagnosis Date    Benign essential hypertension     Ill-defined condition     sciatica    Macular degeneration     followed by Dr. Natali Boo involving multiple sites     Restless legs syndrome       Past Surgical History:   Procedure Laterality Date    HX BREAST BIOPSY      benign    HX  SECTION      HX HYSTERECTOMY      HX OOPHORECTOMY Right      Current Outpatient Medications   Medication Sig Dispense Refill    hydroCHLOROthiazide (HYDRODIURIL) 25 mg tablet TAKE 1 TABLET BY MOUTH ONCE DAILY IN THE MORNING 90 Tab 0    labetaloL (NORMODYNE) 100 mg tablet Take 1 tablet by mouth twice daily 180 Tab 0    acetaminophen (TYLENOL ARTHRITIS PAIN) 650 mg TbER Take 650 mg by mouth every eight (8) hours.  docusate sodium (COLACE) 100 mg capsule Take 200 mg by mouth daily.  GLUCOSAMINE/CHONDR CROWE A SOD (OSTEO BI-FLEX PO) Take 2 Tabs by mouth daily.  cetirizine (ZYRTEC) 10 mg tablet Take 10 mg by mouth daily.        No Known Allergies    Family History   Problem Relation Age of Onset    Heart Disease Mother     Diabetes Mother         in later life   [facility] Heart Disease Father     Hypertension Brother     Diabetes Maternal Aunt     Cancer Paternal Aunt         lung    Cancer Paternal Grandmother         breast and colon    Stroke Maternal Grandmother     Stroke Maternal Grandfather      Social History     Tobacco Use    Smoking status: Never Smoker    Smokeless tobacco: Never Used   Substance Use Topics    Alcohol use: No         Tod Schroeder LPN

## 2021-05-18 NOTE — PROGRESS NOTES
Erick Carpenter presents today for   Chief Complaint   Patient presents with    Follow-up     check up       Erick Carpenter preferred language for health care discussion is english. Is someone accompanying this pt? no    Is the patient using any DME equipment during [address]? no    Depression Screening:  3 most recent PHQ Screens 5/18/2021   PHQ Not Done -   Little interest or pleasure in doing things Not at all   Feeling down, depressed, irritable, or hopeless Not at all   Total Score PHQ 2 0       Learning Assessment:  Learning Assessment 2/8/2018   PRIMARY LEARNER Patient   HIGHEST LEVEL OF EDUCATION - PRIMARY LEARNER  GRADUATED HIGH SCHOOL OR GED   BARRIERS PRIMARY LEARNER NONE   CO-LEARNER CAREGIVER No   PRIMARY LANGUAGE ENGLISH    NEED No   LEARNER PREFERENCE PRIMARY DEMONSTRATION   LEARNING SPECIAL TOPICS no   ANSWERED BY self   RELATIONSHIP SELF   ASSESSMENT COMMENT no       Abuse Screening:  Abuse Screening Questionnaire 3/7/2019   Do you ever feel afraid of your partner? N   Are you in a relationship with someone who physically or mentally threatens you? N   Is it safe for you to go home? Y       Fall Risk  Fall Risk Assessment, last 12 mths 5/18/2021   Able to walk? Yes   Fall in past 12 months? 0   Number of falls in past 12 months -       Health Maintenance reviewed and discussed and ordered per Provider. Health Maintenance Due   Topic Date Due    COVID-19 Vaccine (1) Never done    Shingrix Vaccine Age 50> (1 of 2) Never done    Medicare Yearly Exam  02/06/2021   . Erick Carpenter is updated on all     Pt currently taking Antiplatelet therapy? no    Coordination of Care:  1. Have you been to the ER, urgent care clinic since your last visit? Dec urgent care for stuffy nose Hospitalized since your last visit? no    2.  Have you seen or consulted any other health care providers outside of the [address] [place] [address] since your last visit? no Include any pap smears or colon screening.  no

## 2021-05-26 NOTE — PATIENT INSTRUCTIONS
Medicare Wellness Visit, Female The best way to live healthy is to have a lifestyle where you eat a well-balanced diet, exercise regularly, limit alcohol use, and quit all forms of tobacco/nicotine, if applicable. Regular preventive services are another way to keep healthy. Preventive services (vaccines, screening tests, monitoring & exams) can help personalize your care plan, which helps you manage your own care. Screening tests can find health problems at the earliest stages, when they are easiest to treat. [facility] follows the current, evidence-based guidelines published by the [facility] T[facility] Diaz ([facility]) when recommending preventive services for our patients. Because we follow these guidelines, sometimes recommendations change over time as research supports it. (For example, mammograms used to be recommended annually. Even though Medicare will still pay for an annual mammogram, the newer guidelines recommend a mammogram every two years for women of average risk). Of course, you and your doctor may decide to screen more often for some diseases, based on your risk and your co-morbidities (chronic disease you are already diagnosed with). Preventive services for you include: - Medicare offers their members a free annual wellness visit, which is time for you and your primary care provider to discuss and plan for your preventive service needs. Take advantage of this benefit every year! 
-All adults over the age of 72 should receive the recommended pneumonia vaccines. Current USPSTF guidelines recommend a series of two vaccines for the best pneumonia protection.  
-All adults should have a flu vaccine yearly and a tetanus vaccine every 10 years.  
-All adults age 48 and older should receive the shingles vaccines (series of two vaccines).      
-All adults age 38-68 who are overweight should have a diabetes screening test once every three years.  
-All adults born between [birth year] and 1967 should be screened once for Hepatitis C. 
-Other screening tests and preventive services for persons with diabetes include: an eye exam to screen for diabetic retinopathy, a kidney function test, a foot exam, and stricter control over your cholesterol.  
-Cardiovascular screening for adults with routine risk involves an electrocardiogram (ECG) at intervals determined by your doctor.  
-Colorectal cancer screenings should be done for adults age 54-65 with no increased risk factors for colorectal cancer. There are a number of acceptable methods of screening for this type of cancer. Each test has its own benefits and drawbacks. Discuss with your doctor what is most appropriate for you during your annual wellness visit. The different tests include: colonoscopy (considered the best screening method), a fecal occult blood test, a fecal DNA test, and sigmoidoscopy. 
 
-A bone mass density test is recommended when a woman turns 65 to screen for osteoporosis. This test is only recommended one time, as a screening. Some providers will use this same test as a disease monitoring tool if you already have osteoporosis. -Breast cancer screenings are recommended every other year for women of normal risk, age 54-69. 
-Cervical cancer screenings for women over age 72 are only recommended with certain risk factors. Here is a list of your current Health Maintenance items (your personalized list of preventive services) with a due date: 
Health Maintenance Due Topic Date Due  Shingles Vaccine (1 of 2) Never done

## 2021-06-03 RX ORDER — LABETALOL 100 MG/1
TABLET, FILM COATED ORAL
Qty: 180 TABLET | Refills: 0 | Status: SHIPPED | OUTPATIENT
Start: 2021-06-03 | End: 2021-08-23

## 2021-06-21 ENCOUNTER — OFFICE VISIT (OUTPATIENT)
Dept: ORTHOPEDIC SURGERY | Age: 76
End: 2021-06-21
Payer: MEDICARE

## 2021-06-21 VITALS
HEIGHT: 67 IN | HEART RATE: 64 BPM | BODY MASS INDEX: 37.1 KG/M2 | OXYGEN SATURATION: 97 % | WEIGHT: 236.4 LBS | TEMPERATURE: 96.9 F

## 2021-06-21 DIAGNOSIS — M25.562 CHRONIC PAIN OF LEFT KNEE: ICD-10-CM

## 2021-06-21 DIAGNOSIS — M25.561 CHRONIC PAIN OF RIGHT KNEE: ICD-10-CM

## 2021-06-21 DIAGNOSIS — G89.29 CHRONIC PAIN OF LEFT KNEE: ICD-10-CM

## 2021-06-21 DIAGNOSIS — M17.11 PRIMARY OSTEOARTHRITIS OF RIGHT KNEE: ICD-10-CM

## 2021-06-21 DIAGNOSIS — G89.29 CHRONIC PAIN OF RIGHT KNEE: ICD-10-CM

## 2021-06-21 DIAGNOSIS — M17.12 PRIMARY OSTEOARTHRITIS OF LEFT KNEE: Primary | ICD-10-CM

## 2021-06-21 PROCEDURE — G8427 DOCREV CUR MEDS BY ELIG CLIN: HCPCS | Performed by: SPECIALIST

## 2021-06-21 PROCEDURE — G8756 NO BP MEASURE DOC: HCPCS | Performed by: SPECIALIST

## 2021-06-21 PROCEDURE — 20610 DRAIN/INJ JOINT/BURSA W/O US: CPT | Performed by: SPECIALIST

## 2021-06-21 PROCEDURE — 1090F PRES/ABSN URINE INCON ASSESS: CPT | Performed by: SPECIALIST

## 2021-06-21 PROCEDURE — 99213 OFFICE O/P EST LOW 20 MIN: CPT | Performed by: SPECIALIST

## 2021-06-21 PROCEDURE — G8432 DEP SCR NOT DOC, RNG: HCPCS | Performed by: SPECIALIST

## 2021-06-21 PROCEDURE — G8536 NO DOC ELDER MAL SCRN: HCPCS | Performed by: SPECIALIST

## 2021-06-21 PROCEDURE — G8399 PT W/DXA RESULTS DOCUMENT: HCPCS | Performed by: SPECIALIST

## 2021-06-21 PROCEDURE — G8417 CALC BMI ABV UP PARAM F/U: HCPCS | Performed by: SPECIALIST

## 2021-06-21 PROCEDURE — 1101F PT FALLS ASSESS-DOCD LE1/YR: CPT | Performed by: SPECIALIST

## 2021-06-21 RX ORDER — BETAMETHASONE SODIUM PHOSPHATE AND BETAMETHASONE ACETATE 3; 3 MG/ML; MG/ML
6 INJECTION, SUSPENSION INTRA-ARTICULAR; INTRALESIONAL; INTRAMUSCULAR; SOFT TISSUE ONCE
Status: COMPLETED | OUTPATIENT
Start: 2021-06-21 | End: 2021-06-21

## 2021-06-21 RX ADMIN — BETAMETHASONE SODIUM PHOSPHATE AND BETAMETHASONE ACETATE 6 MG: 3; 3 INJECTION, SUSPENSION INTRA-ARTICULAR; INTRALESIONAL; INTRAMUSCULAR; SOFT TISSUE at 11:44

## 2021-06-21 NOTE — PROGRESS NOTES
Patient: Patrice Franco                MRN: [id number]       SSN: [id number]  YOB: 1945        AGE: 66 y.o. SEX: female    PCP: Sierra Fagan NP  06/21/21    Chief Complaint   Patient presents with    Knee Pain     Bilat        HISTORY:  Patrice Franco is a 66 y.o. female who is for increased bilateral knee pain. She has been experiencing knee pain for the past several years. She has seen Dr. Yonathan Doyle in 2011 and responded to previous cortisone injections. She experienced a right knee giving away episode last week while she was at [employer]. She felt immediate pain and swelling. She notes pain with standing, walking, and stair climbing. She experiences startup pain after sitting. She completed a Euflexxa series on 12/2/19. She was previously seen for back pain. She notes back pain with standing and walking. She has a h/o sciatica--radiates into her right leg. She states she went to the [facility] a couple months ago and she noticed her legs went numb while standing in line. She has attended PT in the past with relief. Pain Assessment  6/21/2021   Location of Pain Knee   Location Modifiers Left;Right   Severity of Pain 2   Quality of Pain Dull   Quality of Pain Comment -   Duration of Pain A few minutes   Duration of Pain Comment -   Frequency of Pain Intermittent   Frequency of Pain Comment -   Aggravating Factors Walking   Limiting Behavior No   Relieving Factors Elevation; Rest   Result of Injury No     Occupation, etc:  Ms. Will Mendez is a retired  at [employer]. She is active with her [employer]. She lives in [place] alone. Her  passed away in October. She has 1 adult son, a 10 yo granddaughter, and a 2 yo grandson. She is not very active due to her severe knee pains. Ms. Will Mendez weighs 236 lbs and is 5'7\" tall. She has been vaccinated for Covid-19.         No results found for: HBA1C, DHE7DEOQ, OIG2OUCK, AKE0QMUC  Weight Metrics 6/21/2021 5/18/2021 2/6/2020 12/2/2019 12/2/2019 11/22/2019 11/14/2019   Weight 236 lb 6.4 oz 238 lb 3.2 oz 227 lb 235 lb 232 lb 9.6 oz 237 lb 236 lb   BMI 37.03 kg/m2 37.31 kg/m2 35.55 kg/m2 36.81 kg/m2 36.43 kg/m2 37.12 kg/m2 36.96 kg/m2       Patient Active Problem List   Diagnosis Code    Benign essential hypertension I10    Osteoarthrosis involving multiple sites M15.9    Restless legs syndrome G25.81    Advance directive discussed with patient Z71.89    Severe obesity ([facility] [address] E66.01     REVIEW OF SYSTEMS: All Below are Negative except: See HPI   Constitutional: negative for fever, chills, and weight loss. Cardiovascular: negative for chest pain, claudication, leg swelling, SOB, ALMEIDA   Gastrointestinal: Negative for pain, N/V/C/D, Blood in stool or urine, dysuria, hematuria, incontinence, pelvic pain. Musculoskeletal: See HPI   Neurological: Negative for dizziness and weakness. Negative for headaches, Visual changes, confusion, seizures   Phychiatric/Behavioral: Negative for depression, memory loss, substance abuse. Extremities: Negative for hair changes, rash, or skin lesion changes. Hematologic: Negative for bleeding problems, bruising, pallor or swollen lymph nodes   Peripheral Vascular: No calf pain, no circulation deficits.     Social History     Socioeconomic History    Marital status:      Spouse name: Not on file    Number of children: Not on file    Years of education: Not on file    Highest education level: Not on file   Occupational History    Not on file   Tobacco Use    Smoking status: Never Smoker    Smokeless tobacco: Never Used   Substance and Sexual Activity    Alcohol use: No    Drug use: No    Sexual activity: Yes     Partners: Male   Other Topics Concern    Not on file   Social History Narrative    Not on file     Social Determinants of Health     Financial Resource Strain:     Difficulty of Paying Living Expenses:    Food Insecurity:     Worried About Running Out of Food in the Last Year:    [address] in the Last Year:    Transportation Needs:     Lack of Transportation (Medical):  Lack of Transportation (Non-Medical):    Physical Activity:     Days of Exercise per Week:     Minutes of Exercise per Session:    Stress:     Feeling of Stress :    Social Connections:     Frequency of Communication with Friends and Family:     Frequency of Social Gatherings with Friends and Family:     Attends Cheondoism Services:     Active Member of Clubs or Organizations:     Attends Club or Organization Meetings:     Marital Status:    Intimate Partner Violence:     Fear of Current or Ex-Partner:     Emotionally Abused:     Physically Abused:     Sexually Abused:       No Known Allergies   Current Outpatient Medications   Medication Sig    labetaloL (NORMODYNE) 100 mg tablet Take 1 tablet by mouth twice daily    hydroCHLOROthiazide (HYDRODIURIL) 25 mg tablet TAKE 1 TABLET BY MOUTH ONCE DAILY IN THE MORNING    acetaminophen (TYLENOL ARTHRITIS PAIN) 650 mg TbER Take 650 mg by mouth every eight (8) hours.  docusate sodium (COLACE) 100 mg capsule Take 200 mg by mouth daily.  cetirizine (ZYRTEC) 10 mg tablet Take 10 mg by mouth daily.  GLUCOSAMINE/CHONDR CROWE A SOD (OSTEO BI-FLEX PO) Take 2 Tabs by mouth daily. No current facility-administered medications for this visit.       PHYSICAL EXAMINATION:  Visit Vitals  Pulse 64   Temp 96.9 °F (36.1 °C) (Temporal)   Ht 5' 7\" (1.702 m)   Wt 236 lb 6.4 oz (107.2 kg)   SpO2 97%   BMI 37.03 kg/m²      ORTHO EXAMINATION:  Examination Right knee Left knee   Skin Intact Intact   Range of motion 100-0 100-0   Effusion + -   Medial joint line tenderness + +   Lateral joint line tenderness - -   Popliteal tenderness - -   Osteophytes palpable + +   Yessenias - -   Patella crepitus + +   Anterior drawer - -   Lateral laxity - -   Medial laxity - -   Varus deformity - +   Valgus deformity - -   Pretibial edema - - Calf tenderness - -   TIME OUT:  Chart reviewed for the following:   Elizabeth Garsia MD, have reviewed the History, Physical and updated the Allergic reactions for [address] performed immediately prior to start of procedure:  Elizabeth Garsia MD, have performed the following reviews on [facility] prior to the start of the procedure:          * Patient was identified by name and date of birth   * Agreement on procedure being performed was verified  * Risks and Benefits explained to the patient  * Procedure site verified and marked as necessary  * Patient was positioned for comfort  * Consent was obtained     Time: 11:36 AM     Date of procedure: 6/21/2021  Procedure performed by:  Arnulfo Freitas MD  Ms. Paez tolerated the procedure well with no complications. RADIOGRAPHS:  XR BILAT KNEE 10/28/19 RIO  IMPRESSION:  Three views - No fractures, no effusion, severe joint space narrowing, + osteophytes present. Kellgren Victor M grade 4    IMPRESSION:      ICD-10-CM ICD-9-CM    1. Primary osteoarthritis of left knee  M17.12 715.16 betamethasone (CELESTONE) injection 6 mg      MT DRAIN/INJECT LARGE JOINT/BURSA      PROCEDURE AUTHORIZATION TO    2. Chronic pain of left knee  M25.562 719.46 betamethasone (CELESTONE) injection 6 mg    G89.29 338.29 MT DRAIN/INJECT LARGE JOINT/BURSA      PROCEDURE AUTHORIZATION TO    3. Primary osteoarthritis of right knee  M17.11 715.16 betamethasone (CELESTONE) injection 6 mg      MT DRAIN/INJECT LARGE JOINT/BURSA      PROCEDURE AUTHORIZATION TO    4. Chronic pain of right knee  M25.561 719.46 betamethasone (CELESTONE) injection 6 mg    G89.29 338.29 MT DRAIN/INJECT LARGE JOINT/BURSA      PROCEDURE AUTHORIZATION TO      PLAN:  Consider visco supplementation if pain continues. After discussing treatment options, patient's knees were injected with 4 cc Marcaine and 1/2 cc Celestone.  There is no need for surgery at this time. She will follow up as needed.      Scribed by Oseas Darby (Fernie Hogue) as dictated by Helena Starr MD

## 2021-06-28 ENCOUNTER — OFFICE VISIT (OUTPATIENT)
Dept: ORTHOPEDIC SURGERY | Age: 76
End: 2021-06-28
Payer: MEDICARE

## 2021-06-28 VITALS
RESPIRATION RATE: 15 BRPM | OXYGEN SATURATION: 97 % | WEIGHT: 234 LBS | HEART RATE: 72 BPM | BODY MASS INDEX: 37.61 KG/M2 | HEIGHT: 66 IN

## 2021-06-28 DIAGNOSIS — G56.22 CUBITAL TUNNEL SYNDROME, LEFT: ICD-10-CM

## 2021-06-28 DIAGNOSIS — M19.042 DEGENERATIVE ARTHRITIS OF METACARPOPHALANGEAL JOINT OF LEFT THUMB: Primary | ICD-10-CM

## 2021-06-28 PROCEDURE — 20600 DRAIN/INJ JOINT/BURSA W/O US: CPT | Performed by: ORTHOPAEDIC SURGERY

## 2021-06-28 PROCEDURE — G8756 NO BP MEASURE DOC: HCPCS | Performed by: ORTHOPAEDIC SURGERY

## 2021-06-28 PROCEDURE — 1090F PRES/ABSN URINE INCON ASSESS: CPT | Performed by: ORTHOPAEDIC SURGERY

## 2021-06-28 PROCEDURE — 99214 OFFICE O/P EST MOD 30 MIN: CPT | Performed by: ORTHOPAEDIC SURGERY

## 2021-06-28 PROCEDURE — 73140 X-RAY EXAM OF FINGER(S): CPT | Performed by: ORTHOPAEDIC SURGERY

## 2021-06-28 PROCEDURE — 1101F PT FALLS ASSESS-DOCD LE1/YR: CPT | Performed by: ORTHOPAEDIC SURGERY

## 2021-06-28 PROCEDURE — G8536 NO DOC ELDER MAL SCRN: HCPCS | Performed by: ORTHOPAEDIC SURGERY

## 2021-06-28 PROCEDURE — G8510 SCR DEP NEG, NO PLAN REQD: HCPCS | Performed by: ORTHOPAEDIC SURGERY

## 2021-06-28 PROCEDURE — G8399 PT W/DXA RESULTS DOCUMENT: HCPCS | Performed by: ORTHOPAEDIC SURGERY

## 2021-06-28 PROCEDURE — G8417 CALC BMI ABV UP PARAM F/U: HCPCS | Performed by: ORTHOPAEDIC SURGERY

## 2021-06-28 PROCEDURE — G8427 DOCREV CUR MEDS BY ELIG CLIN: HCPCS | Performed by: ORTHOPAEDIC SURGERY

## 2021-06-28 NOTE — PROGRESS NOTES
Khadijah Flores is a 66 y.o. female right handed retiree. Worker's Compensation and legal considerations: none filed. Vitals:    21 0857   Pulse: 72   Resp: 15   SpO2: 97%   Weight: 234 lb (106.1 kg)   Height: 5' 6\" (1.676 m)   PainSc:   5   PainLoc: Finger           Chief Complaint   Patient presents with    Thumb Pain     left thumb         HPI: Patient presents today with complaints of left thumb pain. She also reports numbness and tingling in the little and ring finger with certain positions.     Date of onset: Chronic    Injury: No    Prior Treatment:  No    Numbness/ Tingling: Yes: Comment: Left little and ring finger      ROS: Review of Systems - General ROS: negative  Psychological ROS: negative  ENT ROS: negative  Allergy and Immunology ROS: negative  Hematological and Lymphatic ROS: negative  Respiratory ROS: no cough, shortness of breath, or wheezing  Cardiovascular ROS: no chest pain or dyspnea on exertion  Gastrointestinal ROS: no abdominal pain, change in bowel habits, or black or bloody stools  Musculoskeletal ROS: positive for - pain in thumb - left  Neurological ROS: positive for - numbness/tingling  Dermatological ROS: negative    Past Medical History:   Diagnosis Date    Benign essential hypertension     Ill-defined condition     sciatica    Macular degeneration     followed by Dr. Carbajal Prime Osteoarthrosis involving multiple sites     Restless legs syndrome        Past Surgical History:   Procedure Laterality Date    HX BREAST BIOPSY      benign    HX  SECTION      HX HYSTERECTOMY      HX OOPHORECTOMY Right        Current Outpatient Medications   Medication Sig Dispense Refill    labetaloL (NORMODYNE) 100 mg tablet Take 1 tablet by mouth twice daily 180 Tablet 0    hydroCHLOROthiazide (HYDRODIURIL) 25 mg tablet TAKE 1 TABLET BY MOUTH ONCE DAILY IN THE MORNING 90 Tab 0    acetaminophen (TYLENOL ARTHRITIS PAIN) 650 mg TbER Take 650 mg by mouth every eight (8) hours.  docusate sodium (COLACE) 100 mg capsule Take 200 mg by mouth daily.  cetirizine (ZYRTEC) 10 mg tablet Take 10 mg by mouth daily.  GLUCOSAMINE/CHONDR CROWE A SOD (OSTEO BI-FLEX PO) Take 2 Tabs by mouth daily. Current Facility-Administered Medications   Medication Dose Route Frequency Provider Last Rate Last Admin    triamcinolone acetonide (KENALOG) 10 mg/mL injection 5 mg  5 mg Intra artICUlar ONCE Bobby Jaffe,            No Known Allergies        PE:     Physical Exam  Vitals and nursing note reviewed. Constitutional:       General: She is not in acute distress. Appearance: Normal appearance. She is not ill-appearing. Cardiovascular:      Pulses: Normal pulses. Pulmonary:      Effort: Pulmonary effort is normal.   Musculoskeletal:         General: Swelling and tenderness present. No deformity or signs of injury. Normal range of motion. Cervical back: Normal range of motion and neck supple. Right lower leg: No edema. Left lower leg: No edema. Skin:     General: Skin is warm and dry. Capillary Refill: Capillary refill takes less than 2 seconds. Findings: No bruising or erythema. Neurological:      General: No focal deficit present. Mental Status: She is alert and oriented to person, place, and time. Cranial Nerves: No cranial nerve deficit. Sensory: No sensory deficit. Psychiatric:         Mood and Affect: Mood normal.         Behavior: Behavior normal.            Hand: Tenderness palpation localized over the left thumb MP joint.     Examination L Digit(s) R Digit(s)   1st CMC Tenderness -  -    1st CMC Grind -  -    Joao Nodes -  -    Heberden Nodes -  -    A1 Pulley Tenderness   -    Triggering -  -    UCL Instability -  -    RCL Instability -  -    Lateral Stress Pain -  -    Palmar Cords -  -    Tabletop test -  -    Garrod's Pads -  -     Strength       Pinch Strength         ROM: Full      NEUROVASCULAR    Examination L R Examination L R   Carpal Comp. - - Pronator Comp. - -   Carpal Tinel - - Pronator Tinel - -   Phalen's - - Pronator Stress - -   Cubital Comp. +- - Guyon Comp. - -   Cubital Tinel - - Guyon Tinel - -   Elbow Hyperflexion - - Adson's - -   Spurling's - - SC Comp. - -   PCB Median abn - - SC Tinel - -   Radial Tinel - - IC Comp. - -   Digital Tinel - - IC Tinel - -   Radial 2-Pt WNL WNL Ulnar 2-Pt WNL WNL     Radial Pulse: 2+  Capillary Refill: < 2 sec  Kenny: Not Performed  Digital Kenny: Not Performed        Imagin2021 2 views of left thumb shows moderate degenerative changes at the MP joint as well as osteophyte formation. ICD-10-CM ICD-9-CM    1. 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 Cubital tunnel syndrome, left  G56.22 354.2 EMG ONE EXTREMITY UPPER LT      NCV/LAT MOTOR PER NERVE UP/LT         Plan:     Left thumb MP joint injection and left upper extremity EMG. Follow-up and Dispositions    · Return if symptoms worsen or fail to improve.           Plan was reviewed with patient, who verbalized agreement and understanding of the plan     [facility] NOTE        Chart reviewed for the following:   Bobby PINK DO, have reviewed the History, Physical and updated the Allergic reactions for [facility] performed immediately prior to start of procedure:   Bobby PINK DO, have performed the following reviews on [facility] prior to the start of the procedure:            * Patient was identified by name and date of birth   * Agreement on procedure being performed was verified  * Risks and Benefits explained to the patient  * Procedure site verified and marked as necessary  * Patient was positioned for comfort  * Consent was signed and verified     Time: 09:31 AM      Date of procedure: 6/28/2021    Procedure performed by: Konrad Gutierrez DO    Provider assisted by: Celeste DIAZ    Patient assisted by: self    How tolerated by patient: tolerated the procedure well with no complications    Post Procedural Pain Scale: 0 - No Hurt    Comments: none    Procedure:  After consent was obtained, using sterile technique the left thumb was prepped. Local anesthetic used: 1% lidocaine. Kenalog 5 mg and was then injected and the needle withdrawn. The procedure was well tolerated. The patient is asked to continue to rest the area for a few more days before resuming regular activities. It may be more painful for the first 1-2 days. Watch for fever, or increased swelling or persistent pain in the joint. Call or return to clinic prn if such symptoms occur or there is failure to improve as anticipated.

## 2021-07-02 ENCOUNTER — OFFICE VISIT (OUTPATIENT)
Dept: ORTHOPEDIC SURGERY | Age: 76
End: 2021-07-02
Payer: MEDICARE

## 2021-07-02 VITALS
HEART RATE: 67 BPM | HEIGHT: 66 IN | TEMPERATURE: 98.6 F | DIASTOLIC BLOOD PRESSURE: 72 MMHG | BODY MASS INDEX: 38.09 KG/M2 | WEIGHT: 237 LBS | OXYGEN SATURATION: 96 % | SYSTOLIC BLOOD PRESSURE: 127 MMHG

## 2021-07-02 DIAGNOSIS — R20.0 NUMBNESS AND TINGLING IN LEFT HAND: ICD-10-CM

## 2021-07-02 DIAGNOSIS — R20.2 NUMBNESS AND TINGLING IN LEFT HAND: Primary | ICD-10-CM

## 2021-07-02 DIAGNOSIS — R20.2 NUMBNESS AND TINGLING IN LEFT HAND: ICD-10-CM

## 2021-07-02 DIAGNOSIS — R20.0 NUMBNESS AND TINGLING IN LEFT HAND: Primary | ICD-10-CM

## 2021-07-02 PROCEDURE — 95886 MUSC TEST DONE W/N TEST COMP: CPT | Performed by: PHYSICAL MEDICINE & REHABILITATION

## 2021-07-02 PROCEDURE — 95909 NRV CNDJ TST 5-6 STUDIES: CPT | Performed by: PHYSICAL MEDICINE & REHABILITATION

## 2021-07-02 NOTE — PROGRESS NOTES
[address]  [address], [address]  [place], [address] [place] [address] Street  Phone: [phone]  Fax: [phone]        Caterina Renae  : 1945  PCP: Trever James NP  2021    ELECTROMYOGRAPHY AND NERVE CONDUCTION STUDIES    Pink Ormond was referred by Dr. Julia Ballard for electrodiagnostic evaluation of left hand numbness and tingling. NCV & EMG Findings:    All nerve conduction studies (as indicated in the following tables) were within normal limits. All examined muscles (as indicated in the following table) showed no evidence of electrical instability. INTERPRETATION    This was a normal nerve conduction and EMG study showing there to be no signs of neuropathy, myopathy, or radiculopathy in the nerves and muscles tested. CLINICAL INTERPRETATION    Her electrodiagnostic findings do no appear to explain her left arm symptoms. Her symptoms may be related to a transient conduction block with cubital tunnel pressure while sleeping in her recliner. HISTORY OF PRESENT ILLNESS  George Sahu is a 66 y.o. female. Pt presents today for LUE EMG evaluation of left hand numbness and tingling into the pinky and ring fingers on the left. Her symptoms are episodic and worse while she is sleeping in her armchair. Pt notes that she had a previous injury and surgery on the left elbow. PAST MEDICAL HISTORY   Past Medical History:   Diagnosis Date    Benign essential hypertension     Ill-defined condition     sciatica    Macular degeneration     followed by Dr. Deep Rodriguez involving multiple sites     Restless legs syndrome        Past Surgical History:   Procedure Laterality Date    HX BREAST BIOPSY      benign    HX  SECTION      HX HYSTERECTOMY      HX OOPHORECTOMY Right    .       MEDICATIONS    Current Outpatient Medications   Medication Sig Dispense Refill    labetaloL (NORMODYNE) 100 mg tablet Take 1 tablet by mouth twice daily 180 Tablet 0    hydroCHLOROthiazide (HYDRODIURIL) 25 mg tablet TAKE 1 TABLET BY MOUTH ONCE DAILY IN THE MORNING 90 Tab 0    acetaminophen (TYLENOL ARTHRITIS PAIN) 650 mg TbER Take 650 mg by mouth every eight (8) hours.  docusate sodium (COLACE) 100 mg capsule Take 200 mg by mouth daily.  cetirizine (ZYRTEC) 10 mg tablet Take 10 mg by mouth daily.  GLUCOSAMINE/CHONDR CROWE A SOD (OSTEO BI-FLEX PO) Take 2 Tabs by mouth daily. ALLERGIES  No Known Allergies       SOCIAL HISTORY    Social History     Socioeconomic History    Marital status:      Spouse name: Not on file    Number of children: Not on file    Years of education: Not on file    Highest education level: Not on file   Tobacco Use    Smoking status: Never Smoker    Smokeless tobacco: Never Used   Substance and Sexual Activity    Alcohol use: No    Drug use: No    Sexual activity: Yes     Partners: Male     Social Determinants of Health     Financial Resource Strain:     Difficulty of Paying Living Expenses:    Food Insecurity:     Worried About Running Out of Food in the Last Year:     [address] in the Last Year:    Transportation Needs:     Lack of Transportation (Medical):      Lack of Transportation (Non-Medical):    Physical Activity:     Days of Exercise per Week:     Minutes of Exercise per Session:    Stress:     Feeling of Stress :    Social Connections:     Frequency of Communication with Friends and Family:     Frequency of Social Gatherings with Friends and Family:     Attends Holiness Services:     Active Member of Clubs or Organizations:     Attends Club or Organization Meetings:     Marital Status:        FAMILY HISTORY  Family History   Problem Relation Age of Onset    Heart Disease Mother     Diabetes Mother         in later life   [place] Heart Disease Father     Hypertension Brother     Diabetes Maternal Aunt     Cancer Paternal Aunt lung    Cancer Paternal Grandmother         breast and colon    Stroke Maternal Grandmother     Stroke Maternal Grandfather          PHYSICAL EXAMINATION  Visit Vitals  /72 (BP 1 Location: Right upper arm, BP Patient Position: Sitting, BP Cuff Size: Adult long)   Pulse 67   Temp 98.6 °F (37 °C) (Oral)   Ht 5' 6\" (1.676 m)   Wt 237 lb (107.5 kg)   SpO2 96% Comment: RA   BMI 38.25 kg/m²       Pain Assessment  7/2/2021   Location of Pain -   Pain Location Comment -   Location Modifiers -   Severity of Pain 0   Quality of Pain Other (Comment)   Quality of Pain Comment n/t to left hand/fingers   Duration of Pain -   Duration of Pain Comment -   Frequency of Pain -   Frequency of Pain Comment -   Date Pain First Started -   Date Pain First Started Comment -   Aggravating Factors Other (Comment)   Aggravating Factors Comment resting my arm on the arm rest of the chair   Limiting Behavior No   Relieving Factors -   Result of Injury -           Constitutional:  Well developed, well nourished, in no acute distress. Psychiatric: Affect and mood are appropriate. Integumentary: No rashes or abrasions noted on exposed areas. SPINE/MUSCULOSKELETAL EXAM    On brief examination: None.       NCV & EMG Findings:  Nerve Conduction Studies  Anti Sensory Summary Table     Stim Site NR Peak (ms) Norm Peak (ms) O-P Amp (µV) Norm O-P Amp Site1 Site2 Delta-P (ms) Dist (cm) Chaz (m/s) Norm Chaz (m/s)   Left Median Anti Sensory (2nd Digit)   Wrist    3.1 <4 18.0 >13 Wrist 2nd Digit 3.1 14.0 45 >39   Left Radial Anti Sensory (Base 1st Digit)   Wrist    2.1 2.8 40.0 11 Wrist Base 1st Digit 2.1 10.0 48    Left Ulnar Anti Sensory (5th Digit)   Wrist    2.9 <4.0 9.3 >9 Wrist 5th Digit 2.9 14.0 48 >38     Motor Summary Table     Stim Site NR Onset (ms) Norm Onset (ms) O-P Amp (mV) Norm O-P Amp Site1 Site2 Delta-0 (ms) Dist (cm) Chaz (m/s) Norm Chaz (m/s)   Left Median Motor (Abd Poll Brev)   Wrist    3.6 <4.5 9.8 >4.1 Elbow Wrist 3.7 21.0 57 >49   Elbow    7.3  7.9          Left Ulnar Motor (Abd Dig Min)   Wrist    2.7 <3.7 8.3 >7.9 B Elbow Wrist 3.1 19.0 61 >52   B Elbow    5.8  9.1  A Elbow B Elbow 1.4 10.0 71 >43   A Elbow    7.2  9.2            EMG     Side Muscle Nerve Root Ins Act Fibs Psw Amp Dur Poly Recrt Int Milla [place] Comment   Left Biceps Musculocut C5-6 Nml Nml Nml Nml Nml 0 Nml Nml    Left Triceps Radial C6-7-8 Nml Nml Nml Nml Nml 0 Nml Nml    Left PronatorTeres Median C6-7 Nml Nml Nml Nml Nml 0 Nml Nml    Left Abd Poll Brev Median C8-T1 Nml Nml Nml Nml Nml 0 Nml Nml    Left 1stDorInt Ulnar C8-T1 Nml Nml Nml Nml Nml 0 Nml Nml        Nerve Conduction Studies  Anti Sensory Left/Right Comparison     Stim Site L Lat (ms) R Lat (ms) L-R Lat (ms) L Amp (µV) R Amp (µV) L-R Amp (%) Site1 Site2 L Chaz (m/s) R Chaz (m/s) L-R Chaz (m/s)   Median Anti Sensory (2nd Digit)   Wrist 3.1   18.0   Wrist 2nd Digit 45     Radial Anti Sensory (Base 1st Digit)   Wrist 2.1   40.0   Wrist Base 1st Digit 48     Ulnar Anti Sensory (5th Digit)   Wrist 2.9   9.3   Wrist 5th Digit 48       Motor Left/Right Comparison     Stim Site L Lat (ms) R Lat (ms) L-R Lat (ms) L Amp (mV) R Amp (mV) L-R Amp (%) Site1 Site2 L Chaz (m/s) R Chaz (m/s) L-R Chaz (m/s)   Median Motor (Abd Poll Brev)   Wrist 3.6   9.8   Elbow Wrist 57     Elbow 7.3   7.9          Ulnar Motor (Abd Dig Min)   Wrist 2.7   8.3   B Elbow Wrist 61     B Elbow 5.8   9.1   A Elbow B Elbow 71     A Elbow 7.2   9.2                Waveforms:                     [facility]  OFFICE PROCEDURE PROGRESS NOTE        Chart reviewed for the following:   IAleyda, have reviewed the History, Physical and updated the Allergic reactions for [employer]     TIME OUT performed immediately prior to start of procedure:   IAleyda, have performed the following reviews on [employer] prior to the start of the procedure:            * Patient was identified by name and date of birth   * Agreement on procedure being performed was verified  * Risks and Benefits explained to the patient  * Procedure site verified and marked as necessary  * Patient was positioned for comfort  * Consent was signed and verified     Time: 3:59 PM    Date of procedure: 7/2/2021    Procedure performed by:  Bianka Etienne MD    Provider accompanied by: Taty. Patient accompanied by: Self.     How tolerated by patient: tolerated the procedure well with no complications    Post Procedural Pain Scale: 0 - No Hurt    Comments: none    Written by Samantha Tran as dictated by Heath Alvarez MD

## 2021-07-02 NOTE — PROGRESS NOTES
Hosey Gitelman presents today for   Chief Complaint   Patient presents with    Procedure     EMG LUE       Is someone accompanying this pt? no    Is the patient using any DME equipment during OV? no    Depression Screening:  3 most recent PHQ Screens 6/28/2021   PHQ Not Done -   Little interest or pleasure in doing things Not at all   Feeling down, depressed, irritable, or hopeless Not at all   Total Score PHQ 2 0       Learning Assessment:  Learning Assessment 2/8/2018   PRIMARY LEARNER Patient   HIGHEST LEVEL OF EDUCATION - PRIMARY LEARNER  GRADUATED HIGH SCHOOL OR GED   BARRIERS PRIMARY LEARNER NONE   CO-LEARNER CAREGIVER No   PRIMARY LANGUAGE ENGLISH    NEED No   LEARNER PREFERENCE PRIMARY DEMONSTRATION   LEARNING SPECIAL TOPICS no   ANSWERED BY self   RELATIONSHIP SELF   ASSESSMENT COMMENT no       Abuse Screening:  Abuse Screening Questionnaire 3/7/2019   Do you ever feel afraid of your partner? N   Are you in a relationship with someone who physically or mentally threatens you? N   Is it safe for you to go home? Y       Fall Risk  Fall Risk Assessment, last 12 mths 6/28/2021   Able to walk? Yes   Fall in past 12 months? 0   Do you feel unsteady? 0   Are you worried about falling 0   Number of falls in past 12 months -       Coordination of Care:  1. Have you been to the ER, urgent care clinic since your last visit? no  Hospitalized since your last visit? no    2. Have you seen or consulted any other health care providers outside of the [address] [place] [address] since your last visit? Yes, ortho Include any pap smears or colon screening.  no

## 2021-07-26 ENCOUNTER — OFFICE VISIT (OUTPATIENT)
Dept: ORTHOPEDIC SURGERY | Age: 76
End: 2021-07-26
Payer: MEDICARE

## 2021-07-26 VITALS — BODY MASS INDEX: 37.93 KG/M2 | WEIGHT: 236 LBS | HEIGHT: 66 IN | TEMPERATURE: 97.1 F

## 2021-07-26 DIAGNOSIS — M19.042 DEGENERATIVE ARTHRITIS OF METACARPOPHALANGEAL JOINT OF LEFT THUMB: Primary | ICD-10-CM

## 2021-07-26 PROCEDURE — 1101F PT FALLS ASSESS-DOCD LE1/YR: CPT | Performed by: ORTHOPAEDIC SURGERY

## 2021-07-26 PROCEDURE — G8427 DOCREV CUR MEDS BY ELIG CLIN: HCPCS | Performed by: ORTHOPAEDIC SURGERY

## 2021-07-26 PROCEDURE — G8399 PT W/DXA RESULTS DOCUMENT: HCPCS | Performed by: ORTHOPAEDIC SURGERY

## 2021-07-26 PROCEDURE — G8536 NO DOC ELDER MAL SCRN: HCPCS | Performed by: ORTHOPAEDIC SURGERY

## 2021-07-26 PROCEDURE — G8417 CALC BMI ABV UP PARAM F/U: HCPCS | Performed by: ORTHOPAEDIC SURGERY

## 2021-07-26 PROCEDURE — G8432 DEP SCR NOT DOC, RNG: HCPCS | Performed by: ORTHOPAEDIC SURGERY

## 2021-07-26 PROCEDURE — G8756 NO BP MEASURE DOC: HCPCS | Performed by: ORTHOPAEDIC SURGERY

## 2021-07-26 PROCEDURE — 1090F PRES/ABSN URINE INCON ASSESS: CPT | Performed by: ORTHOPAEDIC SURGERY

## 2021-07-26 PROCEDURE — 99214 OFFICE O/P EST MOD 30 MIN: CPT | Performed by: ORTHOPAEDIC SURGERY

## 2021-07-26 NOTE — PROGRESS NOTES
Bernarda Cisneros is a 66 y.o. female right handed retiree. Worker's Compensation and legal considerations: none filed. Vitals:    21 0756   Temp: 97.1 °F (36.2 °C)   TempSrc: Temporal   Weight: 236 lb (107 kg)   Height: 5' 6\" (1.676 m)           Chief Complaint   Patient presents with    Hand Pain     LEFT     HPI: Patient returns today for left upper extremity EMG follow-up. She reports the numbness to be improved with modification of positions of her elbow. She also reports the pain she previously had her left thumb is much improved. Initial HPI: Patient presents today with complaints of left thumb pain. She also reports numbness and tingling in the little and ring finger with certain positions.     Date of onset: Chronic    Injury: No    Prior Treatment:  Yes: Comment: Left thumb MP joint injection    Numbness/ Tingling: No      ROS: Review of Systems - General ROS: negative  Psychological ROS: negative  ENT ROS: negative  Allergy and Immunology ROS: negative  Hematological and Lymphatic ROS: negative  Respiratory ROS: no cough, shortness of breath, or wheezing  Cardiovascular ROS: no chest pain or dyspnea on exertion  Gastrointestinal ROS: no abdominal pain, change in bowel habits, or black or bloody stools  Musculoskeletal ROS: positive for - pain in thumb - left  Neurological ROS: Negative  Dermatological ROS: negative    Past Medical History:   Diagnosis Date    Benign essential hypertension     Ill-defined condition     sciatica    Macular degeneration     followed by Dr. Lolly George Osteoarthrosis involving multiple sites     Restless legs syndrome        Past Surgical History:   Procedure Laterality Date    HX BREAST BIOPSY      benign    HX  SECTION      HX HYSTERECTOMY      HX OOPHORECTOMY Right        Current Outpatient Medications   Medication Sig Dispense Refill    labetaloL (NORMODYNE) 100 mg tablet Take 1 tablet by mouth twice daily 180 Tablet 0    hydroCHLOROthiazide (HYDRODIURIL) 25 mg tablet TAKE 1 TABLET BY MOUTH ONCE DAILY IN THE MORNING 90 Tab 0    acetaminophen (TYLENOL ARTHRITIS PAIN) 650 mg TbER Take 650 mg by mouth every eight (8) hours.  docusate sodium (COLACE) 100 mg capsule Take 200 mg by mouth daily.  cetirizine (ZYRTEC) 10 mg tablet Take 10 mg by mouth daily.  GLUCOSAMINE/CHONDR CROWE A SOD (OSTEO BI-FLEX PO) Take 2 Tabs by mouth daily. No Known Allergies        PE:     Physical Exam  Vitals and nursing note reviewed. Constitutional:       General: She is not in acute distress. Appearance: Normal appearance. She is not ill-appearing. Cardiovascular:      Pulses: Normal pulses. Pulmonary:      Effort: Pulmonary effort is normal.   Musculoskeletal:         General: No swelling, tenderness, deformity or signs of injury. Normal range of motion. Cervical back: Normal range of motion and neck supple. Right lower leg: No edema. Left lower leg: No edema. Skin:     General: Skin is warm and dry. Capillary Refill: Capillary refill takes less than 2 seconds. Findings: No bruising or erythema. Neurological:      General: No focal deficit present. Mental Status: She is alert and oriented to person, place, and time. Cranial Nerves: No cranial nerve deficit. Sensory: No sensory deficit. Psychiatric:         Mood and Affect: Mood normal.         Behavior: Behavior normal.            Hand: Tenderness about the thumb MP joint is much improved compared to previously.     Examination L Digit(s) R Digit(s)   1st CMC Tenderness -  -    1st CMC Grind -  -    Joao Nodes -  -    Heberden Nodes -  -    A1 Pulley Tenderness   -    Triggering -  -    UCL Instability -  -    RCL Instability -  -    Lateral Stress Pain -  -    Palmar Cords -  -    Tabletop test -  -    Garrod's Pads -  -     Strength       Pinch Strength         ROM: Full      NEUROVASCULAR    Examination L R Examination L R Carpal Comp. - - Pronator Comp. - -   Carpal Tinel - - Pronator Tinel - -   Phalen's - - Pronator Stress - -   Cubital Comp. - - Guyon Comp. - -   Cubital Tinel - - Guyon Tinel - -   Elbow Hyperflexion - - Adson's - -   Spurling's - - SC Comp. - -   PCB Median abn - - SC Tinel - -   Radial Tinel - - IC Comp. - -   Digital Tinel - - IC Tinel - -   Radial 2-Pt WNL WNL Ulnar 2-Pt WNL WNL     Radial Pulse: 2+  Capillary Refill: < 2 sec  Kenny: Not Performed  Digital Kenny: Not Performed      NCV & EMG Findings:     All nerve conduction studies (as indicated in the following tables) were within normal limits.       All examined muscles (as indicated in the following table) showed no evidence of electrical instability.       INTERPRETATION     This was a normal nerve conduction and EMG study showing there to be no signs of neuropathy, myopathy, or radiculopathy in the nerves and muscles tested.      CLINICAL INTERPRETATION     Her electrodiagnostic findings do no appear to explain her left arm symptoms. Her symptoms may be related to a transient conduction block with cubital tunnel pressure while sleeping in her recliner. Imagin2021 2 views of left thumb shows moderate degenerative changes at the MP joint as well as osteophyte formation. ICD-10-CM ICD-9-CM    1. 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 Also discussed the transient and positional nature of her paresthesias. Follow-up and Dispositions    · Return if symptoms worsen or fail to improve.           Plan was reviewed with patient, who verbalized agreement and understanding of the plan

## 2021-08-09 RX ORDER — HYDROCHLOROTHIAZIDE 25 MG/1
TABLET ORAL
Qty: 90 TABLET | Refills: 0 | Status: SHIPPED | OUTPATIENT
Start: 2021-08-09 | End: 2021-10-30

## 2021-08-23 RX ORDER — LABETALOL 100 MG/1
TABLET, FILM COATED ORAL
Qty: 180 TABLET | Refills: 0 | Status: SHIPPED | OUTPATIENT
Start: 2021-08-23 | End: 2021-11-18 | Stop reason: SDUPTHER

## 2021-10-30 RX ORDER — HYDROCHLOROTHIAZIDE 25 MG/1
TABLET ORAL
Qty: 90 TABLET | Refills: 0 | Status: SHIPPED | OUTPATIENT
Start: 2021-10-30 | End: 2021-11-18 | Stop reason: SDUPTHER

## 2021-11-10 ENCOUNTER — HOSPITAL ENCOUNTER (OUTPATIENT)
Dept: LAB | Age: 76
Discharge: HOME | End: 2021-11-10
Payer: MEDICARE

## 2021-11-10 LAB
BASOPHILS # BLD: 0 K/UL (ref 0–0.1)
BASOPHILS NFR BLD: 1 % (ref 0–2)
DIFFERENTIAL METHOD BLD: ABNORMAL
EOSINOPHIL # BLD: 0.3 K/UL (ref 0–0.4)
EOSINOPHIL NFR BLD: 3 % (ref 0–5)
ERYTHROCYTE [DISTWIDTH] IN BLOOD BY AUTOMATED COUNT: 13.2 % (ref 11.6–14.5)
HCT VFR BLD AUTO: 41.2 % (ref 35–45)
HGB BLD-MCNC: 13.5 G/DL (ref 12–16)
LYMPHOCYTES # BLD: 1.3 K/UL (ref 0.9–3.6)
LYMPHOCYTES NFR BLD: 15 % (ref 21–52)
MCH RBC QN AUTO: 29.4 PG (ref 24–34)
MCHC RBC AUTO-ENTMCNC: 32.8 G/DL (ref 31–37)
MCV RBC AUTO: 89.8 FL (ref 78–100)
MONOCYTES # BLD: 0.6 K/UL (ref 0.05–1.2)
MONOCYTES NFR BLD: 7 % (ref 3–10)
NEUTS SEG # BLD: 6.4 K/UL (ref 1.8–8)
NEUTS SEG NFR BLD: 74 % (ref 40–73)
PLATELET # BLD AUTO: 264 K/UL (ref 135–420)
PMV BLD AUTO: 10.8 FL (ref 9.2–11.8)
RBC # BLD AUTO: 4.59 M/UL (ref 4.2–5.3)
WBC # BLD AUTO: 8.7 K/UL (ref 4.6–13.2)

## 2021-11-10 PROCEDURE — 36415 COLL VENOUS BLD VENIPUNCTURE: CPT

## 2021-11-10 PROCEDURE — 85025 COMPLETE CBC W/AUTO DIFF WBC: CPT

## 2021-11-18 ENCOUNTER — OFFICE VISIT (OUTPATIENT)
Dept: FAMILY MEDICINE CLINIC | Age: 76
End: 2021-11-18
Payer: MEDICARE

## 2021-11-18 VITALS
OXYGEN SATURATION: 96 % | WEIGHT: 235 LBS | HEIGHT: 66 IN | RESPIRATION RATE: 16 BRPM | SYSTOLIC BLOOD PRESSURE: 135 MMHG | DIASTOLIC BLOOD PRESSURE: 53 MMHG | TEMPERATURE: 98 F | BODY MASS INDEX: 37.77 KG/M2 | HEART RATE: 63 BPM

## 2021-11-18 DIAGNOSIS — G89.29 CHRONIC PAIN OF BOTH KNEES: ICD-10-CM

## 2021-11-18 DIAGNOSIS — M25.562 CHRONIC PAIN OF BOTH KNEES: ICD-10-CM

## 2021-11-18 DIAGNOSIS — I10 BENIGN ESSENTIAL HYPERTENSION: Primary | ICD-10-CM

## 2021-11-18 DIAGNOSIS — M25.561 CHRONIC PAIN OF BOTH KNEES: ICD-10-CM

## 2021-11-18 PROCEDURE — G8427 DOCREV CUR MEDS BY ELIG CLIN: HCPCS | Performed by: NURSE PRACTITIONER

## 2021-11-18 PROCEDURE — G8399 PT W/DXA RESULTS DOCUMENT: HCPCS | Performed by: NURSE PRACTITIONER

## 2021-11-18 PROCEDURE — G8432 DEP SCR NOT DOC, RNG: HCPCS | Performed by: NURSE PRACTITIONER

## 2021-11-18 PROCEDURE — G8752 SYS BP LESS 140: HCPCS | Performed by: NURSE PRACTITIONER

## 2021-11-18 PROCEDURE — 1101F PT FALLS ASSESS-DOCD LE1/YR: CPT | Performed by: NURSE PRACTITIONER

## 2021-11-18 PROCEDURE — G8417 CALC BMI ABV UP PARAM F/U: HCPCS | Performed by: NURSE PRACTITIONER

## 2021-11-18 PROCEDURE — 99213 OFFICE O/P EST LOW 20 MIN: CPT | Performed by: NURSE PRACTITIONER

## 2021-11-18 PROCEDURE — G8754 DIAS BP LESS 90: HCPCS | Performed by: NURSE PRACTITIONER

## 2021-11-18 PROCEDURE — 1090F PRES/ABSN URINE INCON ASSESS: CPT | Performed by: NURSE PRACTITIONER

## 2021-11-18 PROCEDURE — G8536 NO DOC ELDER MAL SCRN: HCPCS | Performed by: NURSE PRACTITIONER

## 2021-11-18 RX ORDER — LABETALOL 100 MG/1
100 TABLET, FILM COATED ORAL 2 TIMES DAILY
Qty: 180 TABLET | Refills: 1 | Status: SHIPPED | OUTPATIENT
Start: 2021-11-18 | End: 2022-06-27 | Stop reason: SDUPTHER

## 2021-11-18 RX ORDER — HYDROCHLOROTHIAZIDE 25 MG/1
25 TABLET ORAL DAILY
Qty: 90 TABLET | Refills: 1 | Status: SHIPPED | OUTPATIENT
Start: 2021-11-18 | End: 2022-02-01

## 2021-11-18 NOTE — PROGRESS NOTES
Sophie Gorman is a 64 y.o. female presenting today for Hypertension (6 month follow-up)    Chief Complaint   Patient presents with    Hypertension     6 month follow-up       HPI:  Sophie Gorman presents to the office today for hypertension follow-up care. BP controlled today. Negative for chest pain or palpitation. Compliant with the treatment plan. Fasting labs completed prior to today's visit. She is complaining of bilateral knee pain. She has difficulty ambulating. At this point the right knee is worse than the left. She was seen by Orthopedic/Dr. Prerna Almeida. but is requesting a referral to Dr. Pickens. Hypertension-BP mildly hypertensive today at 135/53. She is compliant with the treatment plan denies any chest pain or dizziness. ROS    ROS:  History obtained from the patient intake forms which are reviewed with the patient  · General: negative for - chills, fever, weight changes or malaise  · HEENT: no sore throat, nasal congestion, vision problems or ear problems  · Respiratory: no cough, shortness of breath, or wheezing  · Cardiovascular: no chest pain, palpitations, or dyspnea on exertion  · Gastrointestinal: no abdominal pain, N/V, change in bowel habits, or black or bloody stools  · Musculoskeletal: bilateral knee pain  · Neurological: no numbness, tingling, headache or dizziness  · Endo:  No polyuria or polydipsia. · : no hematuria, dysuria, frequency, hesitancy, or nocturia.     · Psychological: negative for - anxiety, depression, sleep disturbances, suicidal or homicidal ideations    No Known Allergies    PHQ Screening   3 most recent PHQ Screens 11/18/2021   PHQ Not Done -   Little interest or pleasure in doing things Not at all   Feeling down, depressed, irritable, or hopeless Not at all   Total Score PHQ 2 0       History  Past Medical History:   Diagnosis Date    Benign essential hypertension     Ill-defined condition     sciatica    Macular degeneration 2015 followed by Dr. Isaias Blanco involving multiple sites     Restless legs syndrome        Past Surgical History:   Procedure Laterality Date    HX BREAST BIOPSY      benign    HX  SECTION      HX HYSTERECTOMY      HX OOPHORECTOMY Right        Social History     Socioeconomic History    Marital status:      Spouse name: Not on file    Number of children: Not on file    Years of education: Not on file    Highest education level: Not on file   Occupational History    Not on file   Tobacco Use    Smoking status: Never Smoker    Smokeless tobacco: Never Used   Substance and Sexual Activity    Alcohol use: No    Drug use: No    Sexual activity: Yes     Partners: Male   Other Topics Concern    Not on file   Social History Narrative    Not on file     Social Determinants of Health     Financial Resource Strain:     Difficulty of Paying Living Expenses: Not on file   Food Insecurity:     Worried About Running Out of Food in the Last Year: Not on file    Kelly of Food in the Last Year: Not on file   Transportation Needs:     Lack of Transportation (Medical): Not on file    Lack of Transportation (Non-Medical):  Not on file   Physical Activity:     Days of Exercise per Week: Not on file    Minutes of Exercise per Session: Not on file   Stress:     Feeling of Stress : Not on file   Social Connections:     Frequency of Communication with Friends and Family: Not on file    Frequency of Social Gatherings with Friends and Family: Not on file    Attends [employer] Services: Not on file    Active Member of Clubs or Organizations: Not on file    Attends Club or Organization Meetings: Not on file    Marital Status: Not on file   Intimate Partner Violence:     Fear of Current or Ex-Partner: Not on file    Emotionally Abused: Not on file    Physically Abused: Not on file    Sexually Abused: Not on file   Housing Stability:     Unable to Pay for Housing in the Last Year: Not on file    Number of Places Lived in the Last Year: Not on file    Unstable Housing in the Last Year: Not on file       Current Outpatient Medications   Medication Sig Dispense Refill    labetaloL (NORMODYNE) 100 mg tablet Take 1 Tablet by mouth two (2) times a day. 180 Tablet 1    hydroCHLOROthiazide (HYDRODIURIL) 25 mg tablet Take 1 Tablet by mouth daily. in the morning 90 Tablet 1    acetaminophen (TYLENOL ARTHRITIS PAIN) 650 mg TbER Take 650 mg by mouth every eight (8) hours.  docusate sodium (COLACE) 100 mg capsule Take 200 mg by mouth daily.  GLUCOSAMINE/CHONDR CROWE A SOD (OSTEO BI-FLEX PO) Take 2 Tabs by mouth daily. Vitals:    11/18/21 1308   BP: (!) 135/53   Pulse: 63   Resp: 16   Temp: 98 °F (36.7 °C)   TempSrc: Oral   SpO2: 96%   Weight: 235 lb (106.6 kg)   Height: 5' 6\" (1.676 m)   PainSc:   0 - No pain       Physical Exam  Vitals and nursing note reviewed. Constitutional:       Appearance: She is obese. Cardiovascular:      Rate and Rhythm: Normal rate and regular rhythm. Pulses: Normal pulses. Heart sounds: Normal heart sounds. Pulmonary:      Effort: Pulmonary effort is normal.      Breath sounds: Normal breath sounds. Abdominal:      General: Bowel sounds are normal.      Palpations: Abdomen is soft. Musculoskeletal:      Comments: Trace of swelling bilateral ankles   Skin:     General: Skin is warm and dry. Neurological:      Mental Status: She is alert.          Hospital Outpatient Visit on 11/10/2021   Component Date Value Ref Range Status    WBC 11/10/2021 8.7  4.6 - 13.2 K/uL Final    RBC 11/10/2021 4.59  4.20 - 5.30 M/uL Final    HGB 11/10/2021 13.5  12.0 - 16.0 g/dL Final    HCT 11/10/2021 41.2  35.0 - 45.0 % Final    MCV 11/10/2021 89.8  78.0 - 100.0 FL Final    MCH 11/10/2021 29.4  24.0 - 34.0 PG Final    MCHC 11/10/2021 32.8  31.0 - 37.0 g/dL Final    RDW 11/10/2021 13.2  11.6 - 14.5 % Final    PLATELET 78/39/4701 797  135 - 420 K/uL Final    MPV 11/10/2021 10.8  9.2 - 11.8 FL Final    NEUTROPHILS 11/10/2021 74* 40 - 73 % Final    LYMPHOCYTES 11/10/2021 15* 21 - 52 % Final    MONOCYTES 11/10/2021 7  3 - 10 % Final    EOSINOPHILS 11/10/2021 3  0 - 5 % Final    BASOPHILS 11/10/2021 1  0 - 2 % Final    ABS. NEUTROPHILS 11/10/2021 6.4  1.8 - 8.0 K/UL Final    ABS. LYMPHOCYTES 11/10/2021 1.3  0.9 - 3.6 K/UL Final    ABS. MONOCYTES 11/10/2021 0.6  0.05 - 1.2 K/UL Final    ABS. EOSINOPHILS 11/10/2021 0.3  0.0 - 0.4 K/UL Final    ABS. BASOPHILS 11/10/2021 0.0  0.0 - 0.1 K/UL Final    DF 11/10/2021 AUTOMATED    Final       No results found for any visits on 11/18/21. Patient Care Team:  Patient Care Team:  Ele Freitas NP as PCP - General (Nurse Practitioner)  Ele Freitas NP as PCP [facility] Provider  Viola Mari MD as Physician (Ophthalmology)  Alverto Arriaga DPM as Physician (Podiatry)  Hailey Yap MD as Physician (Neurosurgery)      Assessment / Plan:      ICD-10-CM ICD-9-CM    1. Benign essential hypertension  I10 401.1    2. Chronic pain of both knees  M25.561 719.46 REFERRAL TO ORTHOPEDICS    M25.562 338.29     G89.29       Hypertension-continue current treatment plan. Negative for side effects or adverse reactions to the prescription prescribed. Chronic knee pain-no referrals placed this visit. I asked the patient if she  had any questions and answered her  questions. The patient stated that she understands the treatment plan and agrees with the treatment plan    This document was created with a voice activated dictation system and may contain transcription errors.

## 2021-11-18 NOTE — PROGRESS NOTES
Anita Peraza presents today for   Chief Complaint   Patient presents with    Hypertension     6 month follow-up       Is someone accompanying this pt? no    Is the patient using any DME equipment during OV? no    Depression Screening:  3 most recent PHQ Screens 11/18/2021   PHQ Not Done -   Little interest or pleasure in doing things Not at all   Feeling down, depressed, irritable, or hopeless Not at all   Total Score PHQ 2 0       Learning Assessment:  Learning Assessment 2/8/2018   PRIMARY LEARNER Patient   HIGHEST LEVEL OF EDUCATION - PRIMARY LEARNER  GRADUATED HIGH SCHOOL OR GED   BARRIERS PRIMARY LEARNER NONE   CO-LEARNER CAREGIVER No   PRIMARY LANGUAGE ENGLISH    NEED No   LEARNER PREFERENCE PRIMARY DEMONSTRATION   LEARNING SPECIAL TOPICS no   ANSWERED BY self   RELATIONSHIP SELF   ASSESSMENT COMMENT no       Abuse Screening:  Abuse Screening Questionnaire 11/18/2021   Do you ever feel afraid of your partner? N   Are you in a relationship with someone who physically or mentally threatens you? N   Is it safe for you to go home? Y       Fall Risk  Fall Risk Assessment, last 12 mths 11/18/2021   Able to walk? No   Fall in past 12 months? -   Do you feel unsteady? -   Are you worried about falling -   Number of falls in past 12 months -       Health Maintenance reviewed and discussed and ordered per Provider. Health Maintenance Due   Topic Date Due    Shingrix Vaccine Age 50> (1 of 2) Never done   . Coordination of Care:  1. Have you been to the ER, urgent care clinic since your last visit? Hospitalized since your last visit? no    2. Have you seen or consulted any other health care providers outside of the [address] [place] [address] since your last visit? Include any pap smears or colon screening.  no

## 2022-02-01 RX ORDER — HYDROCHLOROTHIAZIDE 25 MG/1
TABLET ORAL
Qty: 90 TABLET | Refills: 0 | Status: SHIPPED | OUTPATIENT
Start: 2022-02-01 | End: 2022-05-11

## 2022-02-24 ENCOUNTER — TELEPHONE (OUTPATIENT)
Dept: FAMILY MEDICINE CLINIC | Age: 77
End: 2022-02-24

## 2022-02-24 ENCOUNTER — OFFICE VISIT (OUTPATIENT)
Dept: FAMILY MEDICINE CLINIC | Age: 77
End: 2022-02-24
Payer: MEDICARE

## 2022-02-24 VITALS
HEART RATE: 58 BPM | OXYGEN SATURATION: 97 % | DIASTOLIC BLOOD PRESSURE: 88 MMHG | HEIGHT: 66 IN | WEIGHT: 239 LBS | BODY MASS INDEX: 38.41 KG/M2 | SYSTOLIC BLOOD PRESSURE: 138 MMHG | TEMPERATURE: 96 F | RESPIRATION RATE: 16 BRPM

## 2022-02-24 DIAGNOSIS — M17.12 OSTEOARTHRITIS OF LEFT KNEE, UNSPECIFIED OSTEOARTHRITIS TYPE: ICD-10-CM

## 2022-02-24 DIAGNOSIS — I10 BENIGN ESSENTIAL HYPERTENSION: ICD-10-CM

## 2022-02-24 DIAGNOSIS — E66.01 SEVERE OBESITY (HCC): ICD-10-CM

## 2022-02-24 DIAGNOSIS — Z01.810 ENCOUNTER FOR PRE-OPERATIVE CARDIOVASCULAR CLEARANCE: ICD-10-CM

## 2022-02-24 DIAGNOSIS — Z01.818 PREOPERATIVE CLEARANCE: Primary | ICD-10-CM

## 2022-02-24 DIAGNOSIS — R94.31 ABNORMAL EKG: ICD-10-CM

## 2022-02-24 PROCEDURE — G8432 DEP SCR NOT DOC, RNG: HCPCS | Performed by: NURSE PRACTITIONER

## 2022-02-24 PROCEDURE — G8399 PT W/DXA RESULTS DOCUMENT: HCPCS | Performed by: NURSE PRACTITIONER

## 2022-02-24 PROCEDURE — G8754 DIAS BP LESS 90: HCPCS | Performed by: NURSE PRACTITIONER

## 2022-02-24 PROCEDURE — G8417 CALC BMI ABV UP PARAM F/U: HCPCS | Performed by: NURSE PRACTITIONER

## 2022-02-24 PROCEDURE — 1101F PT FALLS ASSESS-DOCD LE1/YR: CPT | Performed by: NURSE PRACTITIONER

## 2022-02-24 PROCEDURE — G8427 DOCREV CUR MEDS BY ELIG CLIN: HCPCS | Performed by: NURSE PRACTITIONER

## 2022-02-24 PROCEDURE — G8536 NO DOC ELDER MAL SCRN: HCPCS | Performed by: NURSE PRACTITIONER

## 2022-02-24 PROCEDURE — 1090F PRES/ABSN URINE INCON ASSESS: CPT | Performed by: NURSE PRACTITIONER

## 2022-02-24 PROCEDURE — G8752 SYS BP LESS 140: HCPCS | Performed by: NURSE PRACTITIONER

## 2022-02-24 PROCEDURE — 99214 OFFICE O/P EST MOD 30 MIN: CPT | Performed by: NURSE PRACTITIONER

## 2022-02-24 NOTE — PROGRESS NOTES
Jayro Walls presents today for   Chief Complaint   Patient presents with    Pre-op Exam     left total knee replacement       Is someone accompanying this pt? no    Is the patient using any DME equipment during OV? no    Depression Screening:  3 most recent PHQ Screens 2/24/2022   PHQ Not Done -   Little interest or pleasure in doing things Not at all   Feeling down, depressed, irritable, or hopeless Not at all   Total Score PHQ 2 0       Learning Assessment:  Learning Assessment 2/8/2018   PRIMARY LEARNER Patient   HIGHEST LEVEL OF EDUCATION - PRIMARY LEARNER  GRADUATED HIGH SCHOOL OR GED   BARRIERS PRIMARY LEARNER NONE   CO-LEARNER CAREGIVER No   PRIMARY LANGUAGE ENGLISH    NEED No   LEARNER PREFERENCE PRIMARY DEMONSTRATION   LEARNING SPECIAL TOPICS no   ANSWERED BY self   RELATIONSHIP SELF   ASSESSMENT COMMENT no       Abuse Screening:  Abuse Screening Questionnaire 2/24/2022   Do you ever feel afraid of your partner? N   Are you in a relationship with someone who physically or mentally threatens you? N   Is it safe for you to go home? Y       Fall Risk  Fall Risk Assessment, last 12 mths 2/24/2022   Able to walk? Yes   Fall in past 12 months? 0   Do you feel unsteady? 0   Are you worried about falling 0   Number of falls in past 12 months -       Health Maintenance reviewed and discussed and ordered per Provider. Health Maintenance Due   Topic Date Due    Shingrix Vaccine Age 50> (1 of 2) Never done   . Coordination of Care:  1. Have you been to the ER, urgent care clinic since your last visit? Hospitalized since your last visit? no    2. Have you seen or consulted any other health care providers outside of the [address] [place] [address] since your last visit? Include any pap smears or colon screening.  no

## 2022-02-24 NOTE — PROGRESS NOTES
.kfs  Precious Ross is a 64 y.o. female presenting today for Pre-op Exam (left total knee replacement)    Chief Complaint   Patient presents with    Pre-op Exam     left total knee replacement       Hypertension   Pertinent negatives include no chest pain, no palpitations, no malaise/fatigue, no headaches, no dizziness, no shortness of breath, no nausea and no vomiting. Pre-op Exam  Pertinent negatives include no chest pain, no abdominal pain, no headaches and no shortness of breath.   :  Precious Ross presents to the office today for operative examination. She is scheduled for left total knee replacement with spinal/block anesthesia on March 1, 2022 with Dr. Pickens. Her surgery is scheduled at [facility].  Her preop testing was reviewed as well as her EKG. 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 She also has a left bundle branch block. She carries a medical diagnosis for hypertension. BP controlled today at 138/88. Linda Marrero She negative for chest pain, headache, dizziness or palpitation. Compliant with the treatment plan. Denies any side effects or adverse reaction to the medication. H    Review of Systems   Constitutional: Negative for malaise/fatigue. Respiratory: Negative for cough and shortness of breath. Cardiovascular: Negative for chest pain, palpitations and leg swelling. Gastrointestinal: Negative for abdominal pain, nausea and vomiting. Genitourinary: Negative for dysuria. Musculoskeletal: Positive for joint pain (chronic knee pain). Negative for back pain and myalgias. Neurological: Negative for dizziness and headaches.        ROS:  History obtained from the patient intake forms which are reviewed with the patient  · General: negative for - chills, fever, weight changes or malaise  · HEENT: no sore throat, nasal congestion, vision problems or ear problems  · Respiratory: no cough, shortness of breath, or wheezing  · Cardiovascular: no chest pain, palpitations, or dyspnea on exertion  · Gastrointestinal: no abdominal pain, N/V, change in bowel habits, or black or bloody stools  · Musculoskeletal: bilateral knee pain  · Neurological: no numbness, tingling, headache or dizziness  · Endo:  No polyuria or polydipsia. · : no hematuria, dysuria, frequency, hesitancy, or nocturia.     · Psychological: negative for - anxiety, depression, sleep disturbances, suicidal or homicidal ideations    No Known Allergies    PHQ Screening   3 most recent PHQ Screens 2022   PHQ Not Done -   Little interest or pleasure in doing things Not at all   Feeling down, depressed, irritable, or hopeless Not at all   Total Score PHQ 2 0       History  Past Medical History:   Diagnosis Date    Benign essential hypertension     Ill-defined condition     sciatica    Macular degeneration     followed by Dr. Cesar Duggan involving multiple sites     Restless legs syndrome        Past Surgical History:   Procedure Laterality Date    HX BREAST BIOPSY      benign    HX  SECTION      HX HYSTERECTOMY      HX OOPHORECTOMY Right        Social History     Socioeconomic History    Marital status:      Spouse name: Not on file    Number of children: Not on file    Years of education: Not on file    Highest education level: Not on file   Occupational History    Not on file   Tobacco Use    Smoking status: Never Smoker    Smokeless tobacco: Never Used   Substance and Sexual Activity    Alcohol use: No    Drug use: No    Sexual activity: Yes     Partners: Male   Other Topics Concern    Not on file   Social History Narrative    Not on file     Social Determinants of Health     Financial Resource Strain:     Difficulty of Paying Living Expenses: Not on file   Food Insecurity:     Worried About Running Out of Food in the Last Year: Not on file    Kelly of Food in the Last Year: Not on file Transportation Needs:     Lack of Transportation (Medical): Not on file    Lack of Transportation (Non-Medical): Not on file   Physical Activity:     Days of Exercise per Week: Not on file    Minutes of Exercise per Session: Not on file   Stress:     Feeling of Stress : Not on file   Social Connections:     Frequency of Communication with Friends and Family: Not on file    Frequency of Social Gatherings with Friends and Family: Not on file    Attends Yarsanism Services: Not on file    Active Member of [address] [place] [address] or Organizations: Not on file    Attends Club or Organization Meetings: Not on file    Marital Status: Not on file   Intimate Partner Violence:     Fear of Current or Ex-Partner: Not on file    Emotionally Abused: Not on file    Physically Abused: Not on file    Sexually Abused: Not on file   Housing Stability:     Unable to Pay for Housing in the Last Year: Not on file    Number of [place] in the Last Year: Not on file    Unstable Housing in the Last Year: Not on file       Current Outpatient Medications   Medication Sig Dispense Refill    hydroCHLOROthiazide (HYDRODIURIL) 25 mg tablet TAKE 1 TABLET BY MOUTH ONCE DAILY IN THE MORNING 90 Tablet 0    labetaloL (NORMODYNE) 100 mg tablet Take 1 Tablet by mouth two (2) times a day. 180 Tablet 1    docusate sodium (COLACE) 100 mg capsule Take 200 mg by mouth daily.  acetaminophen (TYLENOL ARTHRITIS PAIN) 650 mg TbER Take 650 mg by mouth every eight (8) hours.  GLUCOSAMINE/CHONDR CROWE A SOD (OSTEO BI-FLEX PO) Take 2 Tabs by mouth daily. (Patient not taking: Reported on 2/24/2022)           Vitals:    02/24/22 1132 02/24/22 1211   BP: (!) 151/65 138/88   Pulse: (!) 58    Resp: 16    Temp: (!) 96 °F (35.6 °C)    TempSrc: Oral    SpO2: 97%    Weight: 239 lb (108.4 kg)    Height: 5' 6\" (1.676 m)    PainSc:   0 - No pain        Physical Exam  Vitals and nursing note reviewed. Constitutional:       Appearance: She is obese. Cardiovascular:      Rate and Rhythm: Normal rate and regular rhythm. Pulses: Normal pulses. Heart sounds: Normal heart sounds. Pulmonary:      Effort: Pulmonary effort is normal.      Breath sounds: Normal breath sounds. Abdominal:      General: Bowel sounds are normal.      Palpations: Abdomen is soft. Skin:     General: Skin is warm and dry. Neurological:      Mental Status: She is alert. No visits with results within 3 Month(s) from this visit. Latest known visit with results is:   Hospital Outpatient Visit on 11/10/2021   Component Date Value Ref Range Status    WBC 11/10/2021 8.7  4.6 - 13.2 K/uL Final    RBC 11/10/2021 4.59  4.20 - 5.30 M/uL Final    HGB 11/10/2021 13.5  12.0 - 16.0 g/dL Final    HCT 11/10/2021 41.2  35.0 - 45.0 % Final    MCV 11/10/2021 89.8  78.0 - 100.0 FL Final    MCH 11/10/2021 29.4  24.0 - 34.0 PG Final    MCHC 11/10/2021 32.8  31.0 - 37.0 g/dL Final    RDW 11/10/2021 13.2  11.6 - 14.5 % Final    PLATELET 54/61/1237 457  135 - 420 K/uL Final    MPV 11/10/2021 10.8  9.2 - 11.8 FL Final    NEUTROPHILS 11/10/2021 74* 40 - 73 % Final    LYMPHOCYTES 11/10/2021 15* 21 - 52 % Final    MONOCYTES 11/10/2021 7  3 - 10 % Final    EOSINOPHILS 11/10/2021 3  0 - 5 % Final    BASOPHILS 11/10/2021 1  0 - 2 % Final    ABS. NEUTROPHILS 11/10/2021 6.4  1.8 - 8.0 K/UL Final    ABS. LYMPHOCYTES 11/10/2021 1.3  0.9 - 3.6 K/UL Final    ABS. MONOCYTES 11/10/2021 0.6  0.05 - 1.2 K/UL Final    ABS. EOSINOPHILS 11/10/2021 0.3  0.0 - 0.4 K/UL Final    ABS. BASOPHILS 11/10/2021 0.0  0.0 - 0.1 K/UL Final    DF 11/10/2021 AUTOMATED    Final       No results found for any visits on 02/24/22.     Patient Care Team:  Patient Care Team:  Pablo Jesus NP as PCP - General (Nurse Practitioner)  Pablo Jesus NP as PCP - [facility] Empaneled Provider  Eileen Bailey MD as Physician (Ophthalmology)  Steve Schneider DPM as Physician (Podiatry)  Delmi Alan MD as Physician (Neurosurgery)      Assessment / Plan:      ICD-10-CM ICD-9-CM    1. Preoperative clearance  Z01.818 V72.84    2. Encounter for pre-operative cardiovascular clearance  Z01.810 V72.81 REFERRAL TO CARDIOLOGY   3. Benign essential hypertension  I10 401.1    4. Abnormal EKG  R94.31 794.31 REFERRAL TO CARDIOLOGY   5. Severe obesity ([address]  E66.01 278.01    6. Osteoarthritis of left knee, unspecified osteoarthritis type  M17.12 715.96 REFERRAL TO CARDIOLOGY     Hypertension-continue current treatment plan. Negative for side effects or adverse reactions to the prescription prescribed. In addition to a medical clearance patient will need a cardiac clearance. Referral for Cardiac clearance ordered. I asked the patient if she  had any questions and answered her  questions. The patient stated that she understands the treatment plan and agrees with the treatment plan    This document was created with a voice activated dictation system and may contain transcription errors.

## 2022-02-24 NOTE — TELEPHONE ENCOUNTER
TC to patient, she was notified that she have schedule appointment with Cardiologist Dr. David Thompson on 02/25/2022 to receive pre-op clearance, 12:15 pm and needs to arrive at 11:45 am. Address provided and patient verbalized her understanding.

## 2022-02-25 ENCOUNTER — OFFICE VISIT (OUTPATIENT)
Dept: CARDIOLOGY CLINIC | Age: 77
End: 2022-02-25
Payer: MEDICARE

## 2022-02-25 VITALS
SYSTOLIC BLOOD PRESSURE: 136 MMHG | WEIGHT: 239 LBS | DIASTOLIC BLOOD PRESSURE: 63 MMHG | HEIGHT: 66 IN | BODY MASS INDEX: 38.41 KG/M2 | HEART RATE: 65 BPM

## 2022-02-25 DIAGNOSIS — R94.31 ABNORMAL EKG: ICD-10-CM

## 2022-02-25 DIAGNOSIS — E66.01 SEVERE OBESITY (BMI 35.0-39.9) WITH COMORBIDITY (HCC): ICD-10-CM

## 2022-02-25 DIAGNOSIS — I10 ESSENTIAL HYPERTENSION: ICD-10-CM

## 2022-02-25 DIAGNOSIS — R06.02 SOB (SHORTNESS OF BREATH) ON EXERTION: Primary | ICD-10-CM

## 2022-02-25 DIAGNOSIS — Z82.49 FH: PREMATURE CORONARY HEART DISEASE: ICD-10-CM

## 2022-02-25 DIAGNOSIS — Z01.810 PREOP CARDIOVASCULAR EXAM: ICD-10-CM

## 2022-02-25 PROCEDURE — 1090F PRES/ABSN URINE INCON ASSESS: CPT | Performed by: INTERNAL MEDICINE

## 2022-02-25 PROCEDURE — G8427 DOCREV CUR MEDS BY ELIG CLIN: HCPCS | Performed by: INTERNAL MEDICINE

## 2022-02-25 PROCEDURE — G8417 CALC BMI ABV UP PARAM F/U: HCPCS | Performed by: INTERNAL MEDICINE

## 2022-02-25 PROCEDURE — G8752 SYS BP LESS 140: HCPCS | Performed by: INTERNAL MEDICINE

## 2022-02-25 PROCEDURE — G8399 PT W/DXA RESULTS DOCUMENT: HCPCS | Performed by: INTERNAL MEDICINE

## 2022-02-25 PROCEDURE — G8754 DIAS BP LESS 90: HCPCS | Performed by: INTERNAL MEDICINE

## 2022-02-25 PROCEDURE — 99204 OFFICE O/P NEW MOD 45 MIN: CPT | Performed by: INTERNAL MEDICINE

## 2022-02-25 PROCEDURE — G8536 NO DOC ELDER MAL SCRN: HCPCS | Performed by: INTERNAL MEDICINE

## 2022-02-25 PROCEDURE — G8432 DEP SCR NOT DOC, RNG: HCPCS | Performed by: INTERNAL MEDICINE

## 2022-02-25 PROCEDURE — 1101F PT FALLS ASSESS-DOCD LE1/YR: CPT | Performed by: INTERNAL MEDICINE

## 2022-02-25 NOTE — PATIENT INSTRUCTIONS
Medications Discontinued During This Encounter   Medication Reason    GLUCOSAMINE/CHONDR CROWE A SOD (OSTEO BI-FLEX PO) Therapy Completed          Learning About the Mediterranean Diet  What is the Mediterranean diet? The Mediterranean diet is a style of eating rather than a diet plan. It features foods eaten in [place], [place], [place] and [place], and other countries along the [facility]. It emphasizes eating foods like fish, fruits, vegetables, beans, high-fiber breads and whole grains, nuts, and olive oil. This style of eating includes limited red meat, cheese, and sweets. Why choose the Mediterranean diet? A Mediterranean-style diet may improve heart health. It contains more fat than other heart-healthy diets. But the fats are mainly from nuts, unsaturated oils (such as fish oils and olive oil), and certain nut or seed oils (such as canola, soybean, or flaxseed oil). These fats may help protect the heart and blood vessels. How can you get started on the Mediterranean diet? Here are some things you can do to switch to a more Mediterranean way of eating. What to eat  · Eat a variety of fruits and vegetables each day, such as grapes, blueberries, tomatoes, broccoli, peppers, figs, olives, spinach, eggplant, beans, lentils, and chickpeas. · Eat a variety of whole-grain foods each day, such as oats, brown rice, and whole wheat bread, pasta, and couscous. · Eat fish at least 2 times a week. Try tuna, salmon, mackerel, lake trout, herring, or sardines. · Eat moderate amounts of low-fat dairy products, such as milk, cheese, or yogurt. · Eat moderate amounts of poultry and eggs. · Choose healthy (unsaturated) fats, such as nuts, olive oil, and certain nut or seed oils like canola, soybean, and flaxseed. · Limit unhealthy (saturated) fats, such as butter, palm oil, and coconut oil. And limit fats found in animal products, such as meat and dairy products made with whole milk.  Try to eat red meat only a few times a month in very small amounts. · Limit sweets and desserts to only a few times a week. This includes sugar-sweetened drinks like soda. The Mediterranean diet may also include red wine with your meal1 glass each day for women and up to 2 glasses a day for men. Tips for eating at home  · Use herbs, spices, garlic, lemon zest, and citrus juice instead of salt to add flavor to foods. · Add avocado slices to your sandwich instead of landin. · Have fish for lunch or dinner instead of red meat. Brush the fish with olive oil, and broil or grill it. · Sprinkle your salad with seeds or nuts instead of cheese. · Cook with olive or canola oil instead of butter or oils that are high in saturated fat. · Switch from 2% milk or whole milk to 1% or fat-free milk. · Dip raw vegetables in a vinaigrette dressing or hummus instead of dips made from mayonnaise or sour cream.  · Have a piece of fruit for dessert instead of a piece of cake. Try baked apples, or have some dried fruit. Tips for eating out  · Try broiled, grilled, baked, or poached fish instead of having it fried or breaded. · Ask your  to have your meals prepared with olive oil instead of butter. · Order dishes made with marinara sauce or sauces made from olive oil. Avoid sauces made from cream or mayonnaise. · Choose whole-grain breads, whole wheat pasta and pizza crust, brown rice, beans, and lentils. · Cut back on butter or margarine on bread. Instead, you can dip your bread in a small amount of olive oil. · Ask for a side salad or grilled vegetables instead of french fries or chips. Where can you learn more? Go to http://www.[employer].com/  Enter O407 in the search box to learn more about \"Learning About the Mediterranean Diet. \"  Current as of: December 17, 2020               Content Version: 13.0  © 3089-0183 [employer].    Care instructions adapted under license by [employer] (which disclaims liability or warranty for this information). If you have questions about a medical condition or this instruction, always ask your healthcare professional. [address] any warranty or liability for your use of this information.

## 2022-02-25 NOTE — PROGRESS NOTES
HISTORY OF PRESENT ILLNESS  Nay Leyva is a 64 y.o. female.  seen as a new patient for abnormal EKG prior to knee surgery with Dr. Pickens. EKG showing left bundle branch block and old anterior MI cannot be definitely ruled out. New Patient  Associated symptoms include shortness of breath. Pertinent negatives include no chest pain and no headaches. Shortness of Breath  The history is provided by the patient. This is a new problem. The problem occurs intermittently. The current episode started more than 1 week ago (yrs). Pertinent negatives include no fever, no headaches, no cough, no wheezing, no PND, no orthopnea, no chest pain, no vomiting, no rash, no leg swelling and no claudication. The problem's precipitants include exercise (<1 block; ). She has tried nothing for the symptoms.      No Known Allergies    Past Medical History:   Diagnosis Date    Benign essential hypertension     Ill-defined condition     sciatica    Macular degeneration     followed by Dr. Leobardo Craft involving multiple sites     Restless legs syndrome        Family History   Problem Relation Age of Onset    Heart Disease Mother     Diabetes Mother         in later life   [place] Stroke Mother 80    Heart Disease Father     Heart Attack Father 52    Hypertension Brother     Diabetes Maternal Aunt     Cancer Paternal Aunt         lung    Cancer Paternal Grandmother         breast and colon    Stroke Maternal Grandmother 62    Stroke Maternal Grandfather 54       Social History     Tobacco Use    Smoking status: Former Smoker     Quit date:      Years since quittin.1    Smokeless tobacco: Never Used   Substance Use Topics    Alcohol use: No    Drug use: No        Current Outpatient Medications   Medication Sig    hydroCHLOROthiazide (HYDRODIURIL) 25 mg tablet TAKE 1 TABLET BY MOUTH ONCE DAILY IN THE MORNING    labetaloL (NORMODYNE) 100 mg tablet Take 1 Tablet by mouth two (2) times a day.  acetaminophen (TYLENOL ARTHRITIS PAIN) 650 mg TbER Take 650 mg by mouth every eight (8) hours.  docusate sodium (COLACE) 100 mg capsule Take 100 mg by mouth daily. 3 tab morning, 2 tabs night     No current facility-administered medications for this visit. Past Surgical History:   Procedure Laterality Date    HX BREAST BIOPSY      benign    HX  SECTION      HX HYSTERECTOMY      HX OOPHORECTOMY Right        Visit Vitals  /63   Pulse 65   Ht 5' 6\" (1.676 m)   Wt 108.4 kg (239 lb)   BMI 38.58 kg/m²       Diagnostic Studies:  I have reviewed the relevant tests done on the patient and show as follows  EKG tracings reviewed by me today. EKG Results     None        XR Results (most recent):  No results found for this or any previous visit. Ms. Crystal Castillo has a reminder for a \"due or due soon\" health maintenance. I have asked that she contact her primary care provider for follow-up on this health maintenance. Review of Systems   Constitutional: Negative for chills, fever, malaise/fatigue and weight loss. HENT: Negative for nosebleeds. Eyes: Negative for discharge. Respiratory: Positive for shortness of breath. Negative for cough and wheezing. Cardiovascular: Negative for chest pain, palpitations, orthopnea, claudication, leg swelling and PND. Gastrointestinal: Negative for diarrhea, nausea and vomiting. Genitourinary: Negative for dysuria and hematuria. Musculoskeletal: Negative for joint pain. Skin: Negative for rash. Neurological: Negative for dizziness, seizures, loss of consciousness and headaches. Endo/Heme/Allergies: Negative for polydipsia. Does not bruise/bleed easily. Psychiatric/Behavioral: Negative for depression and substance abuse. The patient does not have insomnia. Physical Exam  Constitutional:       General: She is not in acute distress. Appearance: She is well-developed. She is obese.    HENT:      Head: Normocephalic and atraumatic. Mouth/Throat:      Dentition: Normal dentition. Eyes:      General: No scleral icterus. Right eye: No discharge. Left eye: No discharge. Neck:      Thyroid: No thyromegaly. Vascular: No carotid bruit or JVD. Cardiovascular:      Rate and Rhythm: Normal rate and regular rhythm. Pulses: Intact distal pulses. Heart sounds: Normal heart sounds, S1 normal and S2 normal. No murmur heard. No friction rub. No gallop. Pulmonary:      Effort: Pulmonary effort is normal.      Breath sounds: Normal breath sounds. No wheezing or rales. Abdominal:      Palpations: Abdomen is soft. There is no mass. Tenderness: There is no abdominal tenderness. Musculoskeletal:      Cervical back: Neck supple. Right lower leg: No edema. Left lower leg: No edema. Lymphadenopathy:      Cervical:      Right cervical: No superficial cervical adenopathy. Left cervical: No superficial cervical adenopathy. Skin:     General: Skin is warm and dry. Findings: No rash. Neurological:      Mental Status: She is alert and oriented to person, place, and time. Psychiatric:         Behavior: Behavior normal.         ASSESSMENT and PLAN    Results for Lord Benson (MRN [id number]) as of 2/25/2022 13:06   Ref. Range 9/5/2017 10:40 1/26/2018 11:45 3/8/2019 11:50 11/21/2019 11:50 4/23/2021 09:04   Triglyceride Latest Ref Range: <150 MG/DL 99 113  65 85   Cholesterol, total Latest Ref Range: <200 MG/ (H) 180  185 187   HDL Cholesterol Latest Ref Range: 40 - 60 MG/DL 51 48  52 57   CHOL/HDL Ratio Latest Ref Range: 0 - 5.0   4.0 3.8  3.6 3.3   VLDL, calculated Latest Units: MG/DL 19.8 22.6  13 17   LDL, calculated Latest Ref Range: 0 - 100 MG/.2 (H) 109.4 (H)  120 (H) 113 (H)         Diagnoses and all orders for this visit:    1. SOB (shortness of breath) on exertion  -     ECHO ADULT COMPLETE; Future  -     NUCLEAR CARDIAC STRESS TEST; Future    2. Abnormal EKG    3. Essential hypertension    4. Severe obesity (BMI 35.0-39. 9) with comorbidity ([address]    5. FH: premature coronary heart disease    6. Preop cardiovascular exam  -     ECHO ADULT COMPLETE; Future  -     NUCLEAR CARDIAC STRESS TEST; Future        Pertinent laboratory and test data reviewed and discussed with patient. See patient instructions also for other medical advice given    Medications Discontinued During This Encounter   Medication Reason    GLUCOSAMINE/CHONDR CROWE A SOD (OSTEO BI-FLEX PO) Therapy Completed       Follow-up and Dispositions    · Return in about 3 months (around 5/25/2022), or if symptoms worsen or fail to improve, for post test, clearance post echo and stress, please call surgical office to postpone surgery. 2/25/2022 EKG shows left bundle of unknown duration and symptoms of dyspnea are concerning. Will check echo and nuclear before the surgical clearance. Diet weight and exercise discussed. Mediterranean diet guidelines given. Blood pressure is controlled continue same medications. Lipids are not very well controlled and if any significant ASCVD found, she will need statins as well.

## 2022-03-20 PROBLEM — E66.01 SEVERE OBESITY (HCC): Status: ACTIVE | Noted: 2019-03-10

## 2022-03-20 PROBLEM — Z71.89 ADVANCE DIRECTIVE DISCUSSED WITH PATIENT: Status: ACTIVE | Noted: 2017-09-14

## 2022-03-21 ENCOUNTER — TELEPHONE (OUTPATIENT)
Dept: CARDIOLOGY CLINIC | Age: 77
End: 2022-03-21

## 2022-03-21 NOTE — TELEPHONE ENCOUNTER
Can you review patients nuc stress and echo. Needs clearance for knee replacement by Dr Alvina Schrader at the [facility]. Not schduled yet.  [phone]

## 2022-05-06 ENCOUNTER — OFFICE VISIT (OUTPATIENT)
Dept: CARDIOLOGY CLINIC | Age: 77
End: 2022-05-06
Payer: MEDICARE

## 2022-05-06 VITALS
HEART RATE: 65 BPM | OXYGEN SATURATION: 97 % | SYSTOLIC BLOOD PRESSURE: 147 MMHG | BODY MASS INDEX: 38.73 KG/M2 | DIASTOLIC BLOOD PRESSURE: 59 MMHG | HEIGHT: 66 IN | WEIGHT: 241 LBS

## 2022-05-06 DIAGNOSIS — I10 ESSENTIAL HYPERTENSION: Primary | ICD-10-CM

## 2022-05-06 DIAGNOSIS — R94.31 ABNORMAL EKG: ICD-10-CM

## 2022-05-06 DIAGNOSIS — Z01.810 PREOP CARDIOVASCULAR EXAM: ICD-10-CM

## 2022-05-06 DIAGNOSIS — E78.00 HYPERCHOLESTEREMIA: ICD-10-CM

## 2022-05-06 DIAGNOSIS — R06.02 SOB (SHORTNESS OF BREATH) ON EXERTION: ICD-10-CM

## 2022-05-06 DIAGNOSIS — E66.01 SEVERE OBESITY (BMI 35.0-39.9) WITH COMORBIDITY (HCC): ICD-10-CM

## 2022-05-06 PROCEDURE — G8753 SYS BP > OR = 140: HCPCS | Performed by: INTERNAL MEDICINE

## 2022-05-06 PROCEDURE — G8536 NO DOC ELDER MAL SCRN: HCPCS | Performed by: INTERNAL MEDICINE

## 2022-05-06 PROCEDURE — G8432 DEP SCR NOT DOC, RNG: HCPCS | Performed by: INTERNAL MEDICINE

## 2022-05-06 PROCEDURE — G8399 PT W/DXA RESULTS DOCUMENT: HCPCS | Performed by: INTERNAL MEDICINE

## 2022-05-06 PROCEDURE — 1090F PRES/ABSN URINE INCON ASSESS: CPT | Performed by: INTERNAL MEDICINE

## 2022-05-06 PROCEDURE — 99214 OFFICE O/P EST MOD 30 MIN: CPT | Performed by: INTERNAL MEDICINE

## 2022-05-06 PROCEDURE — G8417 CALC BMI ABV UP PARAM F/U: HCPCS | Performed by: INTERNAL MEDICINE

## 2022-05-06 PROCEDURE — 1101F PT FALLS ASSESS-DOCD LE1/YR: CPT | Performed by: INTERNAL MEDICINE

## 2022-05-06 PROCEDURE — G8427 DOCREV CUR MEDS BY ELIG CLIN: HCPCS | Performed by: INTERNAL MEDICINE

## 2022-05-06 PROCEDURE — G8754 DIAS BP LESS 90: HCPCS | Performed by: INTERNAL MEDICINE

## 2022-05-06 RX ORDER — AMLODIPINE BESYLATE 2.5 MG/1
2.5 TABLET ORAL DAILY
Qty: 90 TABLET | Refills: 1 | Status: SHIPPED | OUTPATIENT
Start: 2022-05-06 | End: 2022-06-27 | Stop reason: DRUGHIGH

## 2022-05-06 RX ORDER — GLUCOSAM/CHON-MSM1/C/MANG/BOSW 750-644 MG
2 TABLET ORAL DAILY
COMMUNITY
End: 2022-11-04 | Stop reason: ALTCHOICE

## 2022-05-06 RX ORDER — ATORVASTATIN CALCIUM 10 MG/1
10 TABLET, FILM COATED ORAL DAILY
Qty: 90 TABLET | Refills: 1 | Status: SHIPPED | OUTPATIENT
Start: 2022-05-06 | End: 2022-08-15

## 2022-05-06 NOTE — PROGRESS NOTES
HISTORY OF PRESENT ILLNESS  Wayne Caballero is a 64 y.o. female.  seen as a new patient for abnormal EKG prior to knee surgery with Dr. Francisco Peng. EKG showing left bundle branch block and old anterior MI cannot be definitely ruled out. New Patient  Associated symptoms include shortness of breath. Pertinent negatives include no chest pain and no headaches. Shortness of Breath  The history is provided by the patient. This is a new problem. The problem occurs intermittently. The current episode started more than 1 week ago (yrs). Pertinent negatives include no fever, no headaches, no cough, no wheezing, no PND, no orthopnea, no chest pain, no vomiting, no rash, no leg swelling and no claudication. The problem's precipitants include exercise (<1 block; ). She has tried nothing for the symptoms. Follow-up  Associated symptoms include shortness of breath. Pertinent negatives include no chest pain and no headaches.      No Known Allergies    Past Medical History:   Diagnosis Date    Benign essential hypertension     Ill-defined condition     sciatica    Macular degeneration     followed by Dr. Tara Chiu involving multiple sites     Restless legs syndrome        Family History   Problem Relation Age of Onset    Heart Disease Mother     Diabetes Mother         in later life   [place] Stroke Mother 80    Heart Disease Father     Heart Attack Father 52    Hypertension Brother     Diabetes Maternal Aunt     Cancer Paternal Aunt         lung    Cancer Paternal Grandmother         breast and colon    Stroke Maternal Grandmother 62    Stroke Maternal Grandfather 54       Social History     Tobacco Use    Smoking status: Former Smoker     Quit date:      Years since quittin.3    Smokeless tobacco: Never Used   Substance Use Topics    Alcohol use: No    Drug use: No        Current Outpatient Medications   Medication Sig    [id number]-C-blake-bosw (Osteo Bi-Flex Triple Strength) 750 mg-644 mg- 30 mg-1 mg tab Take 2 Tablets by mouth daily.  hydroCHLOROthiazide (HYDRODIURIL) 25 mg tablet TAKE 1 TABLET BY MOUTH ONCE DAILY IN THE MORNING    labetaloL (NORMODYNE) 100 mg tablet Take 1 Tablet by mouth two (2) times a day.  acetaminophen (TYLENOL ARTHRITIS PAIN) 650 mg TbER Take 650 mg by mouth every eight (8) hours.  docusate sodium (COLACE) 100 mg capsule Take 100 mg by mouth daily. 3 tab morning, 2 tabs night     No current facility-administered medications for this visit. Past Surgical History:   Procedure Laterality Date    HX BREAST BIOPSY      benign    HX  SECTION      HX HYSTERECTOMY      HX OOPHORECTOMY Right        Visit Vitals  BP (!) 147/59 (BP 1 Location: Left upper arm, BP Patient Position: Sitting, BP Cuff Size: Thigh)   Pulse 65   Ht 5' 6\" (1.676 m)   Wt 109.3 kg (241 lb)   SpO2 97%   BMI 38.90 kg/m²       Diagnostic Studies:  I have reviewed the relevant tests done on the patient and show as follows  EKG tracings reviewed by me today. EKG Results     None        XR Results (most recent):  No results found for this or any previous visit. Ms. Klaudia Gallo has a reminder for a \"due or due soon\" health maintenance. I have asked that she contact her primary care provider for follow-up on this health maintenance. Review of Systems   Constitutional: Negative for chills, fever, malaise/fatigue and weight loss. HENT: Negative for nosebleeds. Eyes: Negative for discharge. Respiratory: Positive for shortness of breath. Negative for cough and wheezing. Cardiovascular: Negative for chest pain, palpitations, orthopnea, claudication, leg swelling and PND. Gastrointestinal: Negative for diarrhea, nausea and vomiting. Genitourinary: Negative for dysuria and hematuria. Musculoskeletal: Negative for joint pain. Skin: Negative for rash. Neurological: Negative for dizziness, seizures, loss of consciousness and headaches. Endo/Heme/Allergies: Negative for polydipsia. Does not bruise/bleed easily. Psychiatric/Behavioral: Negative for depression and substance abuse. The patient does not have insomnia. 03/07/22    ECHO ADULT COMPLETE 03/07/2022 3/7/2022    Interpretation Summary    Left Ventricle: Left ventricle size is normal. Mildly increased wall thickness. Findings consistent with mild concentric hypertrophy. Normal wall motion. Normal left ventricular systolic function with a visually estimated EF of 55 - 60%. Diastolic dysfunction present with normal LV EF.   Left Atrium: Left atrium is mildly dilated. LA Vol Index A/L is 36 mL/m2. Signed by: Jorge Luis Langford MD on 3/7/2022  4:33 PM  03/07/22    NUCLEAR CARDIAC STRESS TEST 03/07/2022 3/10/2022    Interpretation Summary    Nuclear Findings: Normal left ventricular systolic function post-stress. LVEF measures 63%.   Nuclear Findings: Normal pharmacological myocardial perfusion study. LVEF measures 63%.   Nuclear Findings: LVEF measures 63%. LV perfusion is normal.    ECG: Resting ECG demonstrates normal sinus rhythm.   ECG: Stress ECG was negative for ischemia.   Stress Test: A pharmacological stress test was performed using lexiscan. Hemodynamics are adequate for diagnosis. Blood pressure demonstrated a normal response and heart rate demonstrated a blunted response to stress. The patient reported no symptoms during the stress test.    Signed by: Jorge Luis Langford MD on 3/7/2022  1:49 PM    Physical Exam  Constitutional:       General: She is not in acute distress. Appearance: She is well-developed. She is obese. HENT:      Head: Normocephalic and atraumatic. Mouth/Throat:      Dentition: Normal dentition. Eyes:      General: No scleral icterus. Right eye: No discharge. Left eye: No discharge. Neck:      Thyroid: No thyromegaly. Vascular: No carotid bruit or JVD.    Cardiovascular:      Rate and Rhythm: Normal rate and regular rhythm. Pulses: Intact distal pulses. Heart sounds: Normal heart sounds, S1 normal and S2 normal. No murmur heard. No friction rub. No gallop. Pulmonary:      Effort: Pulmonary effort is normal.      Breath sounds: Normal breath sounds. No wheezing or rales. Abdominal:      Palpations: Abdomen is soft. There is no mass. Tenderness: There is no abdominal tenderness. Musculoskeletal:      Cervical back: Neck supple. Right lower leg: No edema. Left lower leg: No edema. Lymphadenopathy:      Cervical:      Right cervical: No superficial cervical adenopathy. Left cervical: No superficial cervical adenopathy. Skin:     General: Skin is warm and dry. Findings: No rash. Neurological:      Mental Status: She is alert and oriented to person, place, and time. Psychiatric:         Behavior: Behavior normal.         ASSESSMENT and PLAN    Results for Amelie Monge (MRN [id number]) as of 2/25/2022 13:06   Ref. Range 9/5/2017 10:40 1/26/2018 11:45 3/8/2019 11:50 11/21/2019 11:50 4/23/2021 09:04   Triglyceride Latest Ref Range: <150 MG/DL 99 113  65 85   Cholesterol, total Latest Ref Range: <200 MG/ (H) 180  185 187   HDL Cholesterol Latest Ref Range: 40 - 60 MG/DL 51 48  52 57   CHOL/HDL Ratio Latest Ref Range: 0 - 5.0   4.0 3.8  3.6 3.3   VLDL, calculated Latest Units: MG/DL 19.8 22.6  13 17   LDL, calculated Latest Ref Range: 0 - 100 MG/.2 (H) 109.4 (H)  120 (H) 113 (H)     ACC CVD risk score as of 4/21 is 35%. Start statins in 5/22 2/25/2022 EKG shows left bundle of unknown duration and symptoms of dyspnea are concerning. Will check echo and nuclear before the surgical clearance. Diet weight and exercise discussed. Mediterranean diet guidelines given. Blood pressure is controlled continue same medications. Lipids are not very well controlled and if any significant ASCVD found, she will need statins as well.       Diagnoses and all orders for this visit:    1. Essential hypertension  -     amLODIPine (NORVASC) 2.5 mg tablet; Take 1 Tablet by mouth daily. 2. Hypercholesteremia  -     atorvastatin (LIPITOR) 10 mg tablet; Take 1 Tablet by mouth daily.  -     LIPID PANEL; Future  -     HEPATIC FUNCTION PANEL; Future    3. SOB (shortness of breath) on exertion    4. Preop cardiovascular exam    5. Abnormal EKG    6. Severe obesity (BMI 35.0-39. 9) with comorbidity [facility])        Pertinent laboratory and test data reviewed and discussed with patient. See patient instructions also for other medical advice given    There are no discontinued medications. Follow-up and Dispositions    · Return in about 6 months (around 11/6/2022), or if symptoms worsen or fail to improve, for BP log x 4-5 days post med changes, post test.       5/6/2022 blood pressure is elevated. Nuclear test did not show any significant ischemia. Echo showed normal wall motion and ejection fraction with trace mitral regurgitation and mildly dilated LA. Would recommend to control the blood pressure better. Add Norvasc and follow the home chart. Start statins as she has a high risk for future cardiovascular events and follow the labs. Diet weight and exercise discussed again. She will be cleared for her knee replacement surgery with low risk.

## 2022-05-06 NOTE — PROGRESS NOTES
1. Have you been to the ER, urgent care clinic since your last visit? Hospitalized since your last visit? No    2. Have you seen or consulted any other health care providers outside of the [address] [place] [address] since your last visit? Include any pap smears or colon screening. Yes: Dr. Louis Daniel for knees    3. Since your last visit, have you had any of the following symptoms? No    4. Have you had any blood work, X-rays or cardiac testing? Yes When: 2/16/22 Where: [facility]     Requested: NO     In [facility]: YES    5. Where do you normally have your labs drawn? [facility]    6. Do you need any refills today?    No

## 2022-05-06 NOTE — PATIENT INSTRUCTIONS
There are no discontinued medications. After the recommended changes have been made in blood pressure medicines, patient advised to keep BP/HR(pulse rate) chart twice daily and bring us results in next 4 to 5 days. Patient may send the results via \"My Chart\" if desired. Please rest for 5-10 minutes before checking blood pressure. Sit on a comfortable chair without crossing the legs and put your arm on a table. We recommend that you use an upper arm cuff. Check the blood pressure 3 times each time you check the blood pressure and record the lowest reading. If you check the blood pressure in both arms, use the higher reading. A Healthy Heart: Care Instructions  Your Care Instructions     Coronary artery disease, also called heart disease, occurs when a substance called plaque builds up in the vessels that supply oxygen-rich blood to your heart muscle. This can narrow the blood vessels and reduce blood flow. A heart attack happens when blood flow is completely blocked. A high-fat diet, smoking, and other factors increase the risk of heart disease. Your doctor has found that you have a chance of having heart disease. You can do lots of things to keep your heart healthy. It may not be easy, but you can change your diet, exercise more, and quit smoking. These steps really work to lower your chance of heart disease. Follow-up care is a key part of your treatment and safety. Be sure to make and go to all appointments, and call your doctor if you are having problems. It's also a good idea to know your test results and keep a list of the medicines you take. How can you care for yourself at home? Diet    · Use less salt when you cook and eat. This helps lower your blood pressure. Taste food before salting. Add only a little salt when you think you need it. With time, your taste buds will adjust to less salt.     · Eat fewer snack items, fast foods, canned soups, and other high-salt, high-fat, processed foods.   · Read food labels and try to avoid saturated and trans fats. They increase your risk of heart disease by raising cholesterol levels.     · Limit the amount of solid fat-butter, margarine, and shortening-you eat. Use olive, peanut, or canola oil when you cook. Bake, broil, and steam foods instead of frying them.     · Eat a variety of fruit and vegetables every day. Dark green, deep orange, red, or yellow fruits and vegetables are especially good for you. Examples include spinach, carrots, peaches, and berries.     · Foods high in fiber can reduce your cholesterol and provide important vitamins and minerals. High-fiber foods include whole-grain cereals and breads, oatmeal, beans, brown rice, citrus fruits, and apples.     · Eat lean proteins. Heart-healthy proteins include seafood, lean meats and poultry, eggs, beans, peas, nuts, seeds, and soy products.     · Limit drinks and foods with added sugar. These include candy, desserts, and soda pop. Lifestyle changes    · If your doctor recommends it, get more exercise. Walking is a good choice. Bit by bit, increase the amount you walk every day. Try for at least 30 minutes on most days of the week. You also may want to swim, bike, or do other activities.     · Do not smoke. If you need help quitting, talk to your doctor about stop-smoking programs and medicines. These can increase your chances of quitting for good. Quitting smoking may be the most important step you can take to protect your heart. It is never too late to quit.     · Limit alcohol to 2 drinks a day for men and 1 drink a day for women. Too much alcohol can cause health problems.     · Manage other health problems such as diabetes, high blood pressure, and high cholesterol. If you think you may have a problem with alcohol or drug use, talk to your doctor. Medicines    · Take your medicines exactly as prescribed.  Call your doctor if you think you are having a problem with your medicine.     · If your doctor recommends aspirin, take the amount directed each day. Make sure you take aspirin and not another kind of pain reliever, such as acetaminophen (Tylenol). When should you call for help? Call 911 if you have symptoms of a heart attack. These may include:    · Chest pain or pressure, or a strange feeling in the chest.     · Sweating.     · Shortness of breath.     · Pain, pressure, or a strange feeling in the back, neck, jaw, or upper belly or in one or both shoulders or arms.     · Lightheadedness or sudden weakness.     · A fast or irregular heartbeat. After you call 911, the  may tell you to chew 1 adult-strength or 2 to 4 low-dose aspirin. Wait for an ambulance. Do not try to drive yourself. Watch closely for changes in your health, and be sure to contact your doctor if you have any problems. Where can you learn more? Go to http://www.poole.com/  Enter F075 in the search box to learn more about \"A Healthy Heart: Care Instructions. \"  Current as of: January 10, 2022               Content Version: 13.2  © 8313-7169 [employer]. Care instructions adapted under license by [employer] (which disclaims liability or warranty for this information). If you have questions about a medical condition or this instruction, always ask your healthcare professional. [address] any warranty or liability for your use of this information.

## 2022-05-19 ENCOUNTER — TELEPHONE (OUTPATIENT)
Dept: CARDIOLOGY CLINIC | Age: 77
End: 2022-05-19

## 2022-05-19 NOTE — TELEPHONE ENCOUNTER
Spoke with pt regarding blood pressure log, per provider to increase Norvasc to 5 mg/day and keep bp chart X 4 days. Pt will use 2.5 mg tablets since Rx was just picked up and will take two daily.

## 2022-06-27 RX ORDER — AMLODIPINE BESYLATE 5 MG/1
5 TABLET ORAL DAILY
Qty: 90 TABLET | Refills: 1 | Status: SHIPPED | OUTPATIENT
Start: 2022-06-27

## 2022-06-27 RX ORDER — AMLODIPINE BESYLATE 5 MG/1
5 TABLET ORAL DAILY
COMMUNITY
End: 2022-06-27 | Stop reason: DRUGHIGH

## 2022-06-27 NOTE — TELEPHONE ENCOUNTER
Patient scheduled for 8/30 w/ Sharyn     Requested Prescriptions     Pending Prescriptions Disp Refills    labetaloL (NORMODYNE) 100 mg tablet 180 Tablet 1     Sig: Take 1 Tablet by mouth two (2) times a day.

## 2022-06-28 RX ORDER — LABETALOL 100 MG/1
100 TABLET, FILM COATED ORAL 2 TIMES DAILY
Qty: 180 TABLET | Refills: 1 | Status: SHIPPED | OUTPATIENT
Start: 2022-06-28 | End: 2022-08-31 | Stop reason: SDUPTHER

## 2022-08-14 DIAGNOSIS — E78.00 HYPERCHOLESTEREMIA: ICD-10-CM

## 2022-08-15 RX ORDER — ATORVASTATIN CALCIUM 10 MG/1
TABLET, FILM COATED ORAL
Qty: 90 TABLET | Refills: 0 | Status: SHIPPED | OUTPATIENT
Start: 2022-08-15

## 2022-08-31 RX ORDER — LABETALOL 100 MG/1
100 TABLET, FILM COATED ORAL 2 TIMES DAILY
Qty: 180 TABLET | Refills: 1 | Status: SHIPPED | OUTPATIENT
Start: 2022-08-31

## 2022-08-31 RX ORDER — HYDROCHLOROTHIAZIDE 25 MG/1
25 TABLET ORAL DAILY
Qty: 90 TABLET | Refills: 1 | Status: SHIPPED | OUTPATIENT
Start: 2022-08-31

## 2022-10-28 ENCOUNTER — HOSPITAL ENCOUNTER (OUTPATIENT)
Dept: LAB | Age: 77
Discharge: HOME | End: 2022-10-28
Payer: MEDICARE

## 2022-10-28 DIAGNOSIS — E78.00 HYPERCHOLESTEREMIA: ICD-10-CM

## 2022-10-28 LAB
ALBUMIN SERPL-MCNC: 3.7 G/DL (ref 3.4–5)
ALBUMIN/GLOB SERPL: 1.3 {RATIO} (ref 0.8–1.7)
ALP SERPL-CCNC: 94 U/L (ref 45–117)
ALT SERPL-CCNC: 19 U/L (ref 13–56)
AST SERPL-CCNC: 14 U/L (ref 10–38)
BILIRUB DIRECT SERPL-MCNC: 0.2 MG/DL (ref 0–0.2)
BILIRUB SERPL-MCNC: 0.7 MG/DL (ref 0.2–1)
CHOLEST SERPL-MCNC: 123 MG/DL
GLOBULIN SER CALC-MCNC: 2.9 G/DL (ref 2–4)
HDLC SERPL-MCNC: 56 MG/DL (ref 40–60)
HDLC SERPL: 2.2 {RATIO} (ref 0–5)
LDLC SERPL CALC-MCNC: 51.8 MG/DL (ref 0–100)
LIPID PROFILE,FLP: NORMAL
PROT SERPL-MCNC: 6.6 G/DL (ref 6.4–8.2)
TRIGL SERPL-MCNC: 76 MG/DL (ref ?–150)
VLDLC SERPL CALC-MCNC: 15.2 MG/DL

## 2022-10-28 PROCEDURE — 80061 LIPID PANEL: CPT

## 2022-10-28 PROCEDURE — 36415 COLL VENOUS BLD VENIPUNCTURE: CPT

## 2022-10-28 PROCEDURE — 80076 HEPATIC FUNCTION PANEL: CPT

## 2022-11-04 ENCOUNTER — OFFICE VISIT (OUTPATIENT)
Dept: CARDIOLOGY CLINIC | Age: 77
End: 2022-11-04
Payer: MEDICARE

## 2022-11-04 VITALS
SYSTOLIC BLOOD PRESSURE: 128 MMHG | HEIGHT: 66 IN | HEART RATE: 69 BPM | WEIGHT: 228 LBS | DIASTOLIC BLOOD PRESSURE: 59 MMHG | BODY MASS INDEX: 36.64 KG/M2 | OXYGEN SATURATION: 98 %

## 2022-11-04 DIAGNOSIS — I50.32 CHRONIC DIASTOLIC CONGESTIVE HEART FAILURE (HCC): Primary | ICD-10-CM

## 2022-11-04 DIAGNOSIS — E66.01 SEVERE OBESITY (BMI 35.0-39.9) WITH COMORBIDITY (HCC): ICD-10-CM

## 2022-11-04 DIAGNOSIS — I10 ESSENTIAL HYPERTENSION: ICD-10-CM

## 2022-11-04 DIAGNOSIS — I44.7 LBBB (LEFT BUNDLE BRANCH BLOCK): ICD-10-CM

## 2022-11-04 DIAGNOSIS — E78.00 HYPERCHOLESTEREMIA: ICD-10-CM

## 2022-11-04 PROCEDURE — G8754 DIAS BP LESS 90: HCPCS | Performed by: INTERNAL MEDICINE

## 2022-11-04 PROCEDURE — G8427 DOCREV CUR MEDS BY ELIG CLIN: HCPCS | Performed by: INTERNAL MEDICINE

## 2022-11-04 PROCEDURE — G8752 SYS BP LESS 140: HCPCS | Performed by: INTERNAL MEDICINE

## 2022-11-04 PROCEDURE — 1090F PRES/ABSN URINE INCON ASSESS: CPT | Performed by: INTERNAL MEDICINE

## 2022-11-04 PROCEDURE — 3078F DIAST BP <80 MM HG: CPT | Performed by: INTERNAL MEDICINE

## 2022-11-04 PROCEDURE — 99213 OFFICE O/P EST LOW 20 MIN: CPT | Performed by: INTERNAL MEDICINE

## 2022-11-04 PROCEDURE — 1123F ACP DISCUSS/DSCN MKR DOCD: CPT | Performed by: INTERNAL MEDICINE

## 2022-11-04 PROCEDURE — G8432 DEP SCR NOT DOC, RNG: HCPCS | Performed by: INTERNAL MEDICINE

## 2022-11-04 PROCEDURE — G8417 CALC BMI ABV UP PARAM F/U: HCPCS | Performed by: INTERNAL MEDICINE

## 2022-11-04 PROCEDURE — 3074F SYST BP LT 130 MM HG: CPT | Performed by: INTERNAL MEDICINE

## 2022-11-04 PROCEDURE — 1101F PT FALLS ASSESS-DOCD LE1/YR: CPT | Performed by: INTERNAL MEDICINE

## 2022-11-04 PROCEDURE — G8536 NO DOC ELDER MAL SCRN: HCPCS | Performed by: INTERNAL MEDICINE

## 2022-11-04 PROCEDURE — G8399 PT W/DXA RESULTS DOCUMENT: HCPCS | Performed by: INTERNAL MEDICINE

## 2022-11-04 NOTE — PROGRESS NOTES
HISTORY OF PRESENT ILLNESS  Riana Castaneda is a 76 y.o. female.  seen as a new patient for abnormal EKG prior to knee surgery with Dr. Anne Jose. EKG showing left bundle branch block and old anterior MI cannot be definitely ruled out. Follow-up  Associated symptoms include shortness of breath. Pertinent negatives include no chest pain and no headaches. Shortness of Breath  The history is provided by the Patient. This is a new problem. The average episode lasts 5 minutes. The problem occurs intermittently. The current episode started more than 1 week ago (yrs). The problem has been rapidly improving. Pertinent negatives include no fever, no headaches, no cough, no wheezing, no PND, no orthopnea, no chest pain, no vomiting, no rash, no leg swelling and no claudication. The problem's precipitants include exercise (<1 block;  >10 mts;). She has tried nothing for the symptoms.    No Known Allergies    Past Medical History:   Diagnosis Date    Benign essential hypertension     Hypercholesteremia 2022    Ill-defined condition     sciatica    Macular degeneration     followed by Dr. Queen Morgan involving multiple sites     Restless legs syndrome        Family History   Problem Relation Age of Onset    Heart Disease Mother     Diabetes Mother         in later life   Khloe Quiroz Stroke Mother 80    Heart Disease Father     Heart Attack Father 52    Hypertension Brother     Diabetes Maternal Aunt     Cancer Paternal Aunt         lung    Cancer Paternal Grandmother         breast and colon    Stroke Maternal Grandmother 62    Stroke Maternal Grandfather 54       Social History     Tobacco Use    Smoking status: Former     Types: Cigarettes     Quit date:      Years since quittin.8    Smokeless tobacco: Never   Substance Use Topics    Alcohol use: No    Drug use: No        Current Outpatient Medications   Medication Sig    vitamin F-V-U-lutein-minerals (OCUVITE) tablet daily.  labetaloL (NORMODYNE) 100 mg tablet Take 1 Tablet by mouth two (2) times a day.  hydroCHLOROthiazide (HYDRODIURIL) 25 mg tablet Take 1 Tablet by mouth daily. in the morning    atorvastatin (LIPITOR) 10 mg tablet Take 1 tablet by mouth once daily    amLODIPine (NORVASC) 5 mg tablet Take 1 Tablet by mouth daily.  acetaminophen (TYLENOL) 650 mg TbER Take 650 mg by mouth every eight (8) hours.  docusate sodium (COLACE) 100 mg capsule Take 100 mg by mouth daily. 3 tab morning, 2 tabs night     No current facility-administered medications for this visit. Past Surgical History:   Procedure Laterality Date    HX BREAST BIOPSY      benign    HX  SECTION      HX HYSTERECTOMY      HX OOPHORECTOMY Right        Visit Vitals  BP (!) 128/59   Pulse 69   Ht 5' 6\" (1.676 m)   Wt 103.4 kg (228 lb)   SpO2 98%   BMI 36.80 kg/m²       Diagnostic Studies:  I have reviewed the relevant tests done on the patient and show as follows  EKG tracings reviewed by me today. EKG Results       None          XR Results (most recent):  No results found for this or any previous visit. Ms. Manuel Randall has a reminder for a \"due or due soon\" health maintenance. I have asked that she contact her primary care provider for follow-up on this health maintenance. Review of Systems   Constitutional:  Negative for chills, fever, malaise/fatigue and weight loss. HENT:  Negative for nosebleeds. Eyes:  Negative for discharge. Respiratory:  Positive for shortness of breath. Negative for cough and wheezing. Cardiovascular:  Negative for chest pain, palpitations, orthopnea, claudication, leg swelling and PND. Gastrointestinal:  Negative for diarrhea, nausea and vomiting. Genitourinary:  Negative for dysuria and hematuria. Musculoskeletal:  Negative for joint pain. Skin:  Negative for rash. Neurological:  Negative for dizziness, seizures, loss of consciousness and headaches. Endo/Heme/Allergies:  Negative for polydipsia. Does not bruise/bleed easily. Psychiatric/Behavioral:  Negative for depression and substance abuse. The patient does not have insomnia. 03/07/22    ECHO ADULT COMPLETE 03/07/2022 3/7/2022    Interpretation Summary    Left Ventricle: Left ventricle size is normal. Mildly increased wall thickness. Findings consistent with mild concentric hypertrophy. Normal wall motion. Normal left ventricular systolic function with a visually estimated EF of 55 - 60%. Diastolic dysfunction present with normal LV EF.   Left Atrium: Left atrium is mildly dilated. LA Vol Index A/L is 36 mL/m2. Signed by: Sebastian Ernandez MD on 3/7/2022  4:33 PM  03/07/22    NUCLEAR CARDIAC STRESS TEST 03/07/2022 3/10/2022    Interpretation Summary    Nuclear Findings: Normal left ventricular systolic function post-stress. LVEF measures 63%.   Nuclear Findings: Normal pharmacological myocardial perfusion study. LVEF measures 63%.   Nuclear Findings: LVEF measures 63%. LV perfusion is normal.    ECG: Resting ECG demonstrates normal sinus rhythm.   ECG: Stress ECG was negative for ischemia.   Stress Test: A pharmacological stress test was performed using lexiscan. Hemodynamics are adequate for diagnosis. Blood pressure demonstrated a normal response and heart rate demonstrated a blunted response to stress. The patient reported no symptoms during the stress test.    Signed by: Sebastian Ernandez MD on 3/7/2022  1:49 PM    Physical Exam  Constitutional:       General: She is not in acute distress. Appearance: She is well-developed. She is obese. HENT:      Head: Normocephalic and atraumatic. Mouth/Throat:      Dentition: Normal dentition. Eyes:      General: No scleral icterus. Right eye: No discharge. Left eye: No discharge. Neck:      Thyroid: No thyromegaly. Vascular: No carotid bruit or JVD.    Cardiovascular:      Rate and Rhythm: Normal rate and regular rhythm. Pulses: Intact distal pulses. Heart sounds: Normal heart sounds, S1 normal and S2 normal. No murmur heard. No friction rub. No gallop. Pulmonary:      Effort: Pulmonary effort is normal.      Breath sounds: Normal breath sounds. No wheezing or rales. Abdominal:      Palpations: Abdomen is soft. There is no mass. Tenderness: There is no abdominal tenderness. Musculoskeletal:      Cervical back: Neck supple. Right lower leg: No edema. Left lower leg: No edema. Lymphadenopathy:      Cervical:      Right cervical: No superficial cervical adenopathy. Left cervical: No superficial cervical adenopathy. Skin:     General: Skin is warm and dry. Findings: No rash. Neurological:      Mental Status: She is alert and oriented to person, place, and time. Psychiatric:         Behavior: Behavior normal.   03/07/22    NUCLEAR CARDIAC STRESS TEST 03/07/2022 3/10/2022    Interpretation Summary    Nuclear Findings: Normal left ventricular systolic function post-stress. LVEF measures 63%.   Nuclear Findings: Normal pharmacological myocardial perfusion study. LVEF measures 63%.   Nuclear Findings: LVEF measures 63%. LV perfusion is normal.    ECG: Resting ECG demonstrates normal sinus rhythm.   ECG: Stress ECG was negative for ischemia.   Stress Test: A pharmacological stress test was performed using lexiscan. Hemodynamics are adequate for diagnosis. Blood pressure demonstrated a normal response and heart rate demonstrated a blunted response to stress. The patient reported no symptoms during the stress test.    Signed by: Jaylene Bosworth, MD on 3/7/2022  1:49 PM      ASSESSMENT and PLAN    Results for Elle Simeon (MRN [id number]) as of 2/25/2022 13:06   Ref.  Range 9/5/2017 10:40 1/26/2018 11:45 3/8/2019 11:50 11/21/2019 11:50 4/23/2021 09:04   Triglyceride Latest Ref Range: <150 MG/DL 99 113  65 85   Cholesterol, total Latest Ref Range: <200 MG/DL 203 (H) 180  185 187   HDL Cholesterol Latest Ref Range: 40 - 60 MG/DL 51 48  52 57   CHOL/HDL Ratio Latest Ref Range: 0 - 5.0   4.0 3.8  3.6 3.3   VLDL, calculated Latest Units: MG/DL 19.8 22.6  13 17   LDL, calculated Latest Ref Range: 0 - 100 MG/.2 (H) 109.4 (H)  120 (H) 113 (H)     ACC CVD risk score as of 4/21 is 35%. Start statins in 5/22 2/25/2022 EKG shows left bundle of unknown duration and symptoms of dyspnea are concerning. Will check echo and nuclear before the surgical clearance. Diet weight and exercise discussed. Mediterranean diet guidelines given. Blood pressure is controlled continue same medications. Lipids are not very well controlled and if any significant ASCVD found, she will need statins as well. 5/6/2022 blood pressure is elevated. Nuclear test did not show any significant ischemia. Echo showed normal wall motion and ejection fraction with trace mitral regurgitation and mildly dilated LA. Would recommend to control the blood pressure better. Add Norvasc and follow the home chart. Start statins as she has a high risk for future cardiovascular events and follow the labs. Diet weight and exercise discussed again. She will be cleared for her knee replacement surgery with low risk. Diagnoses and all orders for this visit:    1. Chronic diastolic congestive heart failure ([address]    2. Essential hypertension    3. Hypercholesteremia    4. Severe obesity (BMI 35.0-39. 9) with comorbidity ([address]    5. LBBB (left bundle branch block)      Pertinent laboratory and test data reviewed and discussed with patient. See patient instructions also for other medical advice given    Medications Discontinued During This Encounter   Medication Reason    [id number]-C-blake-bosw (Osteo Bi-Flex Triple Strength) 750 mg-644 mg- 30 mg-1 mg tab Therapy Completed       Follow-up and Dispositions    Return if symptoms worsen or fail to improve.        11/4/2022 CHF is compensated NYHA class II.  She has not been exercising much since knee replacement. I would recommend aggressive physical therapy and exercise. She agrees and will try. Blood pressure is controlled and lipids are excellent  She has left bundle branch block and stress test did not show any ischemia. She is trying to follow the diet and has made some changes towards Mediterranean diet. I will see her as needed in future.

## 2022-11-04 NOTE — PROGRESS NOTES
1. Have you been to the ER, urgent care clinic since your last visit? Hospitalized since your last visit? Yes When: 5/2022 Where: [facility] Reason for visit: knee surgery     2. Have you seen or consulted any other health care providers outside of the [address] [place] [address] since your last visit? Include any pap smears or colon screening. Yes Where: pcp      3. Since your last visit, have you had any of the following symptoms? no         4. Have you had any blood work, X-rays or cardiac testing? No     R    5. Where do you normally have your labs drawn? 6. Do you need any refills today?    yes

## 2022-11-04 NOTE — PATIENT INSTRUCTIONS
Medications Discontinued During This Encounter   Medication Reason    [id number]-[id number]-jyb3-C-blake-geoffw (Osteo Bi-Flex Triple Strength) 750 mg-644 mg- 30 mg-1 mg tab Therapy Completed         A Healthy Heart: Care Instructions  Your Care Instructions     Coronary artery disease, also called heart disease, occurs when a substance called plaque builds up in the vessels that supply oxygen-rich blood to your heart muscle. This can narrow the blood vessels and reduce blood flow. A heart attack happens when blood flow is completely blocked. A high-fat diet, smoking, and other factors increase the risk of heart disease. Your doctor has found that you have a chance of having heart disease. You can do lots of things to keep your heart healthy. It may not be easy, but you can change your diet, exercise more, and quit smoking. These steps really work to lower your chance of heart disease. Follow-up care is a key part of your treatment and safety. Be sure to make and go to all appointments, and call your doctor if you are having problems. It's also a good idea to know your test results and keep a list of the medicines you take. How can you care for yourself at home? Diet    Use less salt when you cook and eat. This helps lower your blood pressure. Taste food before salting. Add only a little salt when you think you need it. With time, your taste buds will adjust to less salt. Eat fewer snack items, fast foods, canned soups, and other high-salt, high-fat, processed foods. Read food labels and try to avoid saturated and trans fats. They increase your risk of heart disease by raising cholesterol levels. Limit the amount of solid fat-butter, margarine, and shortening-you eat. Use olive, peanut, or canola oil when you cook. Bake, broil, and steam foods instead of frying them. Eat a variety of fruit and vegetables every day. Dark green, deep orange, red, or yellow fruits and vegetables are especially good for you. Examples include spinach, carrots, peaches, and berries. Foods high in fiber can reduce your cholesterol and provide important vitamins and minerals. High-fiber foods include whole-grain cereals and breads, oatmeal, beans, brown rice, citrus fruits, and apples. Eat lean proteins. Heart-healthy proteins include seafood, lean meats and poultry, eggs, beans, peas, nuts, seeds, and soy products. Limit drinks and foods with added sugar. These include candy, desserts, and soda pop. Lifestyle changes    If your doctor recommends it, get more exercise. Walking is a good choice. Bit by bit, increase the amount you walk every day. Try for at least 30 minutes on most days of the week. You also may want to swim, bike, or do other activities. Do not smoke. If you need help quitting, talk to your doctor about stop-smoking programs and medicines. These can increase your chances of quitting for good. Quitting smoking may be the most important step you can take to protect your heart. It is never too late to quit. Limit alcohol to 2 drinks a day for men and 1 drink a day for women. Too much alcohol can cause health problems. Manage other health problems such as diabetes, high blood pressure, and high cholesterol. If you think you may have a problem with alcohol or drug use, talk to your doctor. Medicines    Take your medicines exactly as prescribed. Call your doctor if you think you are having a problem with your medicine. If your doctor recommends aspirin, take the amount directed each day. Make sure you take aspirin and not another kind of pain reliever, such as acetaminophen (Tylenol). When should you call for help? Call 911 if you have symptoms of a heart attack. These may include:    Chest pain or pressure, or a strange feeling in the chest.     Sweating. Shortness of breath.      Pain, pressure, or a strange feeling in the back, neck, jaw, or upper belly or in one or both shoulders or arms.     Lightheadedness or sudden weakness. A fast or irregular heartbeat. After you call 911, the  may tell you to chew 1 adult-strength or 2 to 4 low-dose aspirin. Wait for an ambulance. Do not try to drive yourself. Watch closely for changes in your health, and be sure to contact your doctor if you have any problems. Where can you learn more? Go to http://www.poole.com/  Enter F075 in the search box to learn more about \"A Healthy Heart: Care Instructions. \"  Current as of: January 10, 2022               Content Version: 13.4  © 2006-2022 [employer]. Care instructions adapted under license by [employer] (which disclaims liability or warranty for this information). If you have questions about a medical condition or this instruction, always ask your healthcare professional. [address] any warranty or liability for your use of this information.

## 2022-11-08 DIAGNOSIS — E78.00 HYPERCHOLESTEREMIA: ICD-10-CM

## 2022-11-08 RX ORDER — ATORVASTATIN CALCIUM 10 MG/1
TABLET, FILM COATED ORAL
Qty: 90 TABLET | Refills: 0 | Status: SHIPPED | OUTPATIENT
Start: 2022-11-08

## 2022-12-27 RX ORDER — AMLODIPINE BESYLATE 5 MG/1
TABLET ORAL
Qty: 90 TABLET | Refills: 0 | Status: SHIPPED | OUTPATIENT
Start: 2022-12-27

## 2023-02-13 ENCOUNTER — OFFICE VISIT (OUTPATIENT)
Age: 78
End: 2023-02-13
Payer: MEDICARE

## 2023-02-13 VITALS
BODY MASS INDEX: 37.28 KG/M2 | HEIGHT: 66 IN | WEIGHT: 232 LBS | TEMPERATURE: 97.1 F | RESPIRATION RATE: 18 BRPM | SYSTOLIC BLOOD PRESSURE: 129 MMHG | OXYGEN SATURATION: 96 % | DIASTOLIC BLOOD PRESSURE: 68 MMHG | HEART RATE: 59 BPM

## 2023-02-13 DIAGNOSIS — I50.32 CHRONIC DIASTOLIC (CONGESTIVE) HEART FAILURE (HCC): ICD-10-CM

## 2023-02-13 DIAGNOSIS — I10 ESSENTIAL HYPERTENSION: Primary | ICD-10-CM

## 2023-02-13 PROCEDURE — 1123F ACP DISCUSS/DSCN MKR DOCD: CPT | Performed by: INTERNAL MEDICINE

## 2023-02-13 PROCEDURE — 1036F TOBACCO NON-USER: CPT | Performed by: INTERNAL MEDICINE

## 2023-02-13 PROCEDURE — G8417 CALC BMI ABV UP PARAM F/U: HCPCS | Performed by: INTERNAL MEDICINE

## 2023-02-13 PROCEDURE — 99213 OFFICE O/P EST LOW 20 MIN: CPT | Performed by: INTERNAL MEDICINE

## 2023-02-13 PROCEDURE — G8427 DOCREV CUR MEDS BY ELIG CLIN: HCPCS | Performed by: INTERNAL MEDICINE

## 2023-02-13 PROCEDURE — G8399 PT W/DXA RESULTS DOCUMENT: HCPCS | Performed by: INTERNAL MEDICINE

## 2023-02-13 PROCEDURE — 3074F SYST BP LT 130 MM HG: CPT | Performed by: INTERNAL MEDICINE

## 2023-02-13 PROCEDURE — 3078F DIAST BP <80 MM HG: CPT | Performed by: INTERNAL MEDICINE

## 2023-02-13 PROCEDURE — 1090F PRES/ABSN URINE INCON ASSESS: CPT | Performed by: INTERNAL MEDICINE

## 2023-02-13 PROCEDURE — G8484 FLU IMMUNIZE NO ADMIN: HCPCS | Performed by: INTERNAL MEDICINE

## 2023-02-13 RX ORDER — HYDROCHLOROTHIAZIDE 25 MG/1
25 TABLET ORAL DAILY
COMMUNITY
Start: 2022-08-31 | End: 2023-02-13 | Stop reason: SDUPTHER

## 2023-02-13 RX ORDER — PSEUDOEPHEDRINE HCL 30 MG
100 TABLET ORAL DAILY
COMMUNITY

## 2023-02-13 RX ORDER — ASPIRIN 81 MG/1
81 TABLET ORAL 2 TIMES DAILY
COMMUNITY
Start: 2022-05-17

## 2023-02-13 RX ORDER — LABETALOL 100 MG/1
100 TABLET, FILM COATED ORAL 2 TIMES DAILY
COMMUNITY
Start: 2022-08-31

## 2023-02-13 RX ORDER — LABETALOL 100 MG/1
TABLET, FILM COATED ORAL
COMMUNITY
Start: 2022-12-23 | End: 2023-02-13 | Stop reason: SDUPTHER

## 2023-02-13 RX ORDER — TRAMADOL HYDROCHLORIDE 50 MG/1
50 TABLET ORAL EVERY 4 HOURS PRN
COMMUNITY
Start: 2022-05-17

## 2023-02-13 RX ORDER — ATORVASTATIN CALCIUM 10 MG/1
TABLET, FILM COATED ORAL
COMMUNITY
Start: 2022-11-08

## 2023-02-13 RX ORDER — OXYCODONE HYDROCHLORIDE 5 MG/1
5-10 TABLET ORAL EVERY 4 HOURS PRN
COMMUNITY
Start: 2022-05-17

## 2023-02-13 RX ORDER — SENNOSIDES 8.6 MG
650 CAPSULE ORAL EVERY 8 HOURS
COMMUNITY

## 2023-02-13 RX ORDER — AMLODIPINE BESYLATE 5 MG/1
TABLET ORAL
COMMUNITY
Start: 2022-12-27

## 2023-02-13 RX ORDER — AMLODIPINE BESYLATE 2.5 MG/1
2.5 TABLET ORAL DAILY
COMMUNITY

## 2023-02-13 RX ORDER — HYDROCHLOROTHIAZIDE 25 MG/1
TABLET ORAL
COMMUNITY
Start: 2022-12-10

## 2023-02-13 RX ORDER — ATORVASTATIN CALCIUM 10 MG/1
TABLET, FILM COATED ORAL
COMMUNITY
Start: 2023-01-29

## 2023-02-13 ASSESSMENT — PATIENT HEALTH QUESTIONNAIRE - PHQ9
1. LITTLE INTEREST OR PLEASURE IN DOING THINGS: 0
SUM OF ALL RESPONSES TO PHQ9 QUESTIONS 1 & 2: 0
SUM OF ALL RESPONSES TO PHQ QUESTIONS 1-9: 0
SUM OF ALL RESPONSES TO PHQ QUESTIONS 1-9: 0
2. FEELING DOWN, DEPRESSED OR HOPELESS: 0
SUM OF ALL RESPONSES TO PHQ QUESTIONS 1-9: 0
SUM OF ALL RESPONSES TO PHQ QUESTIONS 1-9: 0

## 2023-02-13 ASSESSMENT — ENCOUNTER SYMPTOMS
BLOOD IN STOOL: 0
SHORTNESS OF BREATH: 0

## 2023-02-13 NOTE — PROGRESS NOTES
Subjective:      Patient ID: Yuri Mcghee is a 76 y.o. female.     HPI    Review of Systems    Objective:   Physical Exam    Assessment:      ***      Plan:      ***        Marlene Mckeon

## 2023-02-13 NOTE — PROGRESS NOTES
Urban Villatoro presents today for   Chief Complaint   Patient presents with    New Patient                           1. \"Have you been to the ER, urgent care clinic since your last visit? Hospitalized since your last visit? \" No    2. \"Have you seen or consulted any other health care providers outside of the [address] [place] [address] since your last visit? \" No     3. For patients aged 39-70: Has the patient had a colonoscopy / FIT/ Cologuard? NA - based on age      If the patient is female:    4. For patients aged 41-77: Has the patient had a mammogram within the past 2 years? NA - based on age or sex      11. For patients aged 21-65: Has the patient had a pap smear?  NA - based on age or sex

## 2023-02-13 NOTE — PROGRESS NOTES
CHOCO Silveira is a 66-year-old woman here to establish new care with me. Her chief issues are hypertension and hyperlipidemia. She was sent to a cardiologist after her preop EKG was abnormal in the past, had a stress test and an echo done, and the cardiologist cleared her for surgery. Past Medical History:   Diagnosis Date    Benign essential hypertension     Colon polyps     Hypercholesteremia 2022    Ill-defined condition     sciatica    Macular degeneration     followed by Dr. Bibi Becerra involving multiple sites     Restless legs syndrome         Past Surgical History:   Procedure Laterality Date    APPENDECTOMY      during hystetrectomy    BREAST BIOPSY      benign     SECTION      HYSTERECTOMY (CERVIX STATUS UNKNOWN)      OVARY REMOVAL Right     TOTAL KNEE ARTHROPLASTY      Dr. Milan Check        Current Outpatient Medications   Medication Sig Dispense Refill    amLODIPine (NORVASC) 2.5 MG tablet Take 2.5 mg by mouth daily      acetaminophen (TYLENOL) 650 MG extended release tablet Take 650 mg by mouth in the morning and 650 mg at noon and 650 mg in the evening.       atorvastatin (LIPITOR) 10 MG tablet Take 1 tablet by mouth once daily      hydroCHLOROthiazide (HYDRODIURIL) 25 MG tablet TAKE 1 TABLET BY MOUTH ONCE DAILY IN THE MORNING      labetalol (NORMODYNE) 100 MG tablet Take 100 mg by mouth 2 times daily      Multiple Vitamins-Minerals (OCUVITE ADULT 50+ PO) Take by mouth      amLODIPine (NORVASC) 5 MG tablet TAKE 1 TABLET BY MOUTH ONCE DAILY (Patient not taking: Reported on 2023)      amLODIPine (NORVASC) 5 MG tablet Take 1 tablet by mouth once daily (Patient not taking: Reported on 2023)      aspirin 81 MG EC tablet Take 81 mg by mouth 2 times daily (Patient not taking: Reported on 2023)      atorvastatin (LIPITOR) 10 MG tablet TAKE 1 TABLET BY MOUTH ONCE DAILY (Patient not taking: Reported on 2023)      docusate (COLACE, DULCOLAX) 100 MG CAPS Take 100 mg by mouth daily (Patient not taking: Reported on 2/13/2023)      oxyCODONE (ROXICODONE) 5 MG immediate release tablet Take 5-10 mg by mouth every 4 hours as needed. (Patient not taking: Reported on 2/13/2023)      traMADol (ULTRAM) 50 MG tablet Take 50 mg by mouth every 4 hours as needed. (Patient not taking: Reported on 2/13/2023)       No current facility-administered medications for this visit. Not on File     Social History     Socioeconomic History    Marital status:      Spouse name: Not on file    Number of children: Not on file    Years of education: Not on file    Highest education level: Not on file   Occupational History    Not on file   Tobacco Use    Smoking status: Former    Smokeless tobacco: Never   Substance and Sexual Activity    Alcohol use: No    Drug use: No    Sexual activity: Not on file   Other Topics Concern    Not on file   Social History Narrative    Not on file     Social Determinants of Health     Financial Resource Strain: Not on file   Food Insecurity: Not on file   Transportation Needs: Not on file   Physical Activity: Not on file   Stress: Not on file   Social Connections: Not on file   Intimate Partner Violence: Not on file   Housing Stability: Not on file        Family History   Problem Relation Age of Onset    Stroke Maternal Grandmother 62    Heart Disease Mother     Cancer Paternal Aunt         lung    Diabetes Maternal Aunt     Hypertension Brother     Heart Attack Father 52    Heart Disease Father     Cancer Paternal Grandmother         breast and colon    Diabetes Mother         in later life    Stroke Mother 80    Stroke Maternal Grandfather 55           BP (!) 147/61 (Site: Left Upper Arm, Position: Sitting, Cuff Size: Large Adult)   Pulse 59   Temp 97.1 °F (36.2 °C) (Temporal)   Resp 18   Ht 5' 6\" (1.676 m)   Wt 232 lb (105.2 kg)   SpO2 96%   BMI 37.45 kg/m²      Review of Systems   Respiratory:  Negative for shortness of breath. Cardiovascular:  Negative for chest pain. Gastrointestinal:  Negative for blood in stool. Genitourinary:  Negative for hematuria. Psychiatric/Behavioral:  The patient is not nervous/anxious. Denies depression      Physical Exam  Constitutional:       General: She is not in acute distress. Appearance: She is obese. Eyes:      General: No scleral icterus. Conjunctiva/sclera: Conjunctivae normal.   Cardiovascular:      Rate and Rhythm: Normal rate and regular rhythm. Pulses: Normal pulses. Heart sounds: Murmur heard. No friction rub. No gallop. Pulmonary:      Effort: Pulmonary effort is normal.      Breath sounds: Normal breath sounds. Abdominal:      Palpations: Abdomen is soft. Tenderness: There is no abdominal tenderness. Musculoskeletal:      Comments: No clubbing, cyanosis, or edema. Calves nontender, no cords. Skin:     General: Skin is warm and dry. Neurological:      Mental Status: She is alert and oriented to person, place, and time. Psychiatric:         Mood and Affect: Mood normal.          BP rechecked by me 129/68      Cheri Roth was seen today for new patient. Diagnoses and all orders for this visit:    Essential hypertension  -     Urinalysis W/ Rflx Microscopic; Future  -     Basic Metabolic Panel; Future    Chronic diastolic (congestive) heart failure ([facility] [address]    Establish new care with provider, encounter for      Reviewed EMR prior to patient arrival, with patient reviewed past medical, surgical, social, family histories. No follow-up provider specified.          Daryl Voss MD

## 2023-02-21 ENCOUNTER — OFFICE VISIT (OUTPATIENT)
Age: 78
End: 2023-02-21

## 2023-02-21 VITALS — BODY MASS INDEX: 37.28 KG/M2 | HEIGHT: 66 IN | TEMPERATURE: 97.5 F | WEIGHT: 232 LBS

## 2023-02-21 DIAGNOSIS — G89.29 CHRONIC RIGHT SHOULDER PAIN: ICD-10-CM

## 2023-02-21 DIAGNOSIS — M25.511 CHRONIC RIGHT SHOULDER PAIN: ICD-10-CM

## 2023-02-21 DIAGNOSIS — M19.011 GLENOHUMERAL ARTHRITIS, RIGHT: Primary | ICD-10-CM

## 2023-02-21 RX ORDER — MELOXICAM 15 MG/1
15 TABLET ORAL DAILY
Qty: 30 TABLET | Refills: 3 | Status: CANCELLED | OUTPATIENT
Start: 2023-02-21

## 2023-02-21 NOTE — PROGRESS NOTES
Charlie Oneil  1945   Chief Complaint   Patient presents with    Shoulder Pain     Right shoulder pain        HISTORY OF PRESENT ILLNESS  Charlie Oneil is a 76 y.o. female who presents today for evaluation of right shoulder. Pain is a 0/10. She notes tingling in the right shoulder radiating down to the elbow and to the hand. Also having intermittent numbness and tingling in the right ring and small fingers. She saw Dr. Melecio Yu in  who ordered EMG which was normal. She was also experiencing right shoulder and arm pain around 2 weeks ago with certain movements but since then pain has resolved.  Has tried following treatments: Injections:No; Brace:No; Therapy:No; Cane/Crutch:No      No Known Allergies     Past Medical History:   Diagnosis Date    Benign essential hypertension     Colon polyps     Hypercholesteremia 2022    Ill-defined condition     sciatica    Macular degeneration     followed by Dr. Milka Cook involving multiple sites     Restless legs syndrome       Social History       Tobacco History       Smoking Status  Former      Smokeless Tobacco Use  Never              Alcohol History       Alcohol Use Status  No              Drug Use       Drug Use Status  No              Sexual Activity       Sexually Active  Not Asked                   Past Surgical History:   Procedure Laterality Date    APPENDECTOMY      during hystetrectomy    BREAST BIOPSY      benign     SECTION      HYSTERECTOMY (CERVIX STATUS UNKNOWN)      OVARY REMOVAL Right     TOTAL KNEE ARTHROPLASTY      Dr. Jena Mejia      Family History   Problem Relation Age of Onset    Stroke Maternal Grandmother 62    Heart Disease Mother     Cancer Paternal Aunt         lung    Diabetes Maternal Aunt     Hypertension Brother     Heart Attack Father 52    Heart Disease Father     Cancer Paternal Grandmother         breast and colon    Diabetes Mother         in later life    Stroke Mother 80    Stroke Maternal Grandfather 54     Current Outpatient Medications   Medication Sig    amLODIPine (NORVASC) 2.5 MG tablet Take 2.5 mg by mouth daily    acetaminophen (TYLENOL) 650 MG extended release tablet Take 650 mg by mouth in the morning and 650 mg at noon and 650 mg in the evening. atorvastatin (LIPITOR) 10 MG tablet Take 1 tablet by mouth once daily    hydroCHLOROthiazide (HYDRODIURIL) 25 MG tablet TAKE 1 TABLET BY MOUTH ONCE DAILY IN THE MORNING    labetalol (NORMODYNE) 100 MG tablet Take 100 mg by mouth 2 times daily    Multiple Vitamins-Minerals (OCUVITE ADULT 50+ PO) Take by mouth    amLODIPine (NORVASC) 5 MG tablet TAKE 1 TABLET BY MOUTH ONCE DAILY (Patient not taking: Reported on 2/13/2023)    amLODIPine (NORVASC) 5 MG tablet Take 1 tablet by mouth once daily (Patient not taking: Reported on 2/13/2023)    aspirin 81 MG EC tablet Take 81 mg by mouth 2 times daily (Patient not taking: Reported on 2/13/2023)    atorvastatin (LIPITOR) 10 MG tablet TAKE 1 TABLET BY MOUTH ONCE DAILY (Patient not taking: Reported on 2/13/2023)    docusate (COLACE, DULCOLAX) 100 MG CAPS Take 100 mg by mouth daily (Patient not taking: Reported on 2/13/2023)    oxyCODONE (ROXICODONE) 5 MG immediate release tablet Take 5-10 mg by mouth every 4 hours as needed. (Patient not taking: Reported on 2/13/2023)    traMADol (ULTRAM) 50 MG tablet Take 50 mg by mouth every 4 hours as needed. (Patient not taking: Reported on 2/13/2023)     No current facility-administered medications for this visit. REVIEW OF SYSTEM   Patient denies: Weight loss, Fever/Chills, HA, Visual changes, Fatigue, Chest pain, SOB, Abdominal pain, N/V/D/C, Blood in stool or urine, Edema. Pertinent positive as above in HPI. All others were negative    PHYSICAL EXAM:   Temp 97.5 °F (36.4 °C) (Temporal)   Ht 5' 6\" (1.676 m)   Wt 232 lb (105.2 kg)   BMI 37.45 kg/m²   The patient is a well-developed, well-nourished female   in no acute distress. The patient is alert and oriented times three. The patient is alert and oriented times three. Mood and affect are normal.  LYMPHATIC: lymph nodes are not enlarged and are within normal limits  SKIN: normal in color and non tender to palpation. There are no bruises or abrasions noted. NEUROLOGICAL: Motor sensory exam is within normal limits. Reflexes are equal bilaterally. There is normal sensation to pinprick and light touch  MUSCULOSKELETAL:  Examination Right shoulder   Skin Intact   AC joint tenderness -   Biceps tenderness -   Forward flexion/Elevation    Active abduction    Glenohumeral abduction 90   External rotation ROM 45   Internal rotation ROM 30   Apprehension -   Issas Relocation -   Jerk -   Load and Shift -   Obriens -   Speeds -   Impingement sign -   Supraspinatus/Empty Can -, 5/5   External Rotation Strength -, 5/5   Lift Off/Belly Press -, 5/5   Neurovascular Intact        IMAGING: XR of the right shoulder with 3 views obtained in the office dated 2/21/2023 reviewed and read by Dr. Silvestre Pa: No acute abnormalities      IMPRESSION:      ICD-10-CM    1. Glenohumeral arthritis, right  M19.011       2. Chronic right shoulder pain  M25.511 [75045] Shoulder 2V or more    G89.29            PLAN:   1. Pt presents with right shoulder pain due to glenohumeral arthritis. Her shoulder pain has improved since initial onset 2 weeks ago. Was prescribed Mobic and can return as needed for cortisone injection if pain returns/worsens. Patient was provided with physician-directed home exercise program in the office today. Risk factors include: htn, BMI>35  2. No cortisone injection indicated today  3. No Physical/Occupational Therapy indicated today  4. No diagnostic test indicated today   5. No durable medical equipment indicated today  6. No referral indicated today   7. Yes medications indicated today: MOBIC  8.  No Narcotic indicated today    RTC prn     Scribed by Irma Mcmahontingham (Alyssa Hewett) as dictated by My Ramirez MD    I, Dr. My Ramirez, confirm that all documentation is accurate.     My Ramirez M.D.   [facility]

## 2023-03-20 RX ORDER — HYDROCHLOROTHIAZIDE 25 MG/1
TABLET ORAL
Qty: 90 TABLET | Refills: 0 | OUTPATIENT
Start: 2023-03-20

## 2023-03-31 ENCOUNTER — TELEPHONE (OUTPATIENT)
Age: 78
End: 2023-03-31

## 2023-03-31 DIAGNOSIS — E78.00 HYPERCHOLESTEREMIA: Primary | ICD-10-CM

## 2023-03-31 RX ORDER — ATORVASTATIN CALCIUM 10 MG/1
10 TABLET, FILM COATED ORAL DAILY
Qty: 90 TABLET | Refills: 3 | Status: SHIPPED | OUTPATIENT
Start: 2023-03-31

## 2023-03-31 RX ORDER — ATORVASTATIN CALCIUM 10 MG/1
10 TABLET, FILM COATED ORAL DAILY
Qty: 30 TABLET | Status: CANCELLED | OUTPATIENT
Start: 2023-03-31

## 2023-03-31 NOTE — TELEPHONE ENCOUNTER
Patient is requesting a refill of Atorvastain 10 mg  90 days sent to [facility], thank you

## 2023-04-04 ENCOUNTER — TELEPHONE (OUTPATIENT)
Age: 78
End: 2023-04-04

## 2023-04-04 DIAGNOSIS — I10 ESSENTIAL HYPERTENSION: Primary | ICD-10-CM

## 2023-04-04 RX ORDER — HYDROCHLOROTHIAZIDE 25 MG/1
TABLET ORAL
Qty: 90 TABLET | Refills: 3 | Status: SHIPPED | OUTPATIENT
Start: 2023-04-04

## 2023-04-04 RX ORDER — AMLODIPINE BESYLATE 5 MG/1
5 TABLET ORAL DAILY
Qty: 90 TABLET | Refills: 3 | Status: SHIPPED | OUTPATIENT
Start: 2023-04-04

## 2023-06-26 RX ORDER — LABETALOL 100 MG/1
TABLET, FILM COATED ORAL
Qty: 180 TABLET | Refills: 0 | OUTPATIENT
Start: 2023-06-26

## 2023-07-13 ENCOUNTER — TELEPHONE (OUTPATIENT)
Age: 78
End: 2023-07-13

## 2023-07-13 DIAGNOSIS — Z29.8 NEED FOR SBE (SUBACUTE BACTERIAL ENDOCARDITIS) PROPHYLAXIS: Primary | ICD-10-CM

## 2023-07-13 DIAGNOSIS — I10 ESSENTIAL HYPERTENSION: Primary | ICD-10-CM

## 2023-07-13 RX ORDER — LABETALOL 100 MG/1
100 TABLET, FILM COATED ORAL 2 TIMES DAILY
Qty: 180 TABLET | Refills: 0 | Status: SHIPPED | OUTPATIENT
Start: 2023-07-13

## 2023-07-13 RX ORDER — AMOXICILLIN 500 MG/1
CAPSULE ORAL
Qty: 4 CAPSULE | Refills: 2 | Status: SHIPPED | OUTPATIENT
Start: 2023-07-13

## 2023-07-13 NOTE — TELEPHONE ENCOUNTER
Patient has a dentist appt on Monday for a cleaning and was told she needed to contact her PCP for an antibiotic because she has had a knee replacement. She need that to be sent to [facility].

## 2023-08-07 ENCOUNTER — TELEPHONE (OUTPATIENT)
Age: 78
End: 2023-08-07

## 2023-08-07 ENCOUNTER — HOSPITAL ENCOUNTER (OUTPATIENT)
Facility: HOSPITAL | Age: 78
Setting detail: SPECIMEN
Discharge: HOME | End: 2023-08-10
Payer: MEDICARE

## 2023-08-07 DIAGNOSIS — I10 ESSENTIAL HYPERTENSION: ICD-10-CM

## 2023-08-07 LAB
ANION GAP SERPL CALC-SCNC: 4 MMOL/L (ref 3–18)
APPEARANCE UR: CLEAR
BACTERIA URNS QL MICRO: NEGATIVE /HPF
BILIRUB UR QL: NEGATIVE
BUN SERPL-MCNC: 20 MG/DL (ref 7–18)
BUN/CREAT SERPL: 26 (ref 12–20)
CALCIUM SERPL-MCNC: 9 MG/DL (ref 8.5–10.1)
CHLORIDE SERPL-SCNC: 107 MMOL/L (ref 100–111)
CO2 SERPL-SCNC: 28 MMOL/L (ref 21–32)
COLOR UR: YELLOW
CREAT SERPL-MCNC: 0.76 MG/DL (ref 0.6–1.3)
EPITH CASTS URNS QL MICRO: NORMAL /LPF (ref 0–5)
GLUCOSE SERPL-MCNC: 95 MG/DL (ref 74–99)
GLUCOSE UR STRIP.AUTO-MCNC: NEGATIVE MG/DL
HGB UR QL STRIP: NEGATIVE
KETONES UR QL STRIP.AUTO: NEGATIVE MG/DL
LEUKOCYTE ESTERASE UR QL STRIP.AUTO: ABNORMAL
NITRITE UR QL STRIP.AUTO: NEGATIVE
PH UR STRIP: 5.5 (ref 5–8)
POTASSIUM SERPL-SCNC: 4.3 MMOL/L (ref 3.5–5.5)
PROT UR STRIP-MCNC: NEGATIVE MG/DL
RBC #/AREA URNS HPF: NEGATIVE /HPF (ref 0–5)
SODIUM SERPL-SCNC: 139 MMOL/L (ref 136–145)
SP GR UR REFRACTOMETRY: 1.02 (ref 1–1.03)
UROBILINOGEN UR QL STRIP.AUTO: 0.2 EU/DL (ref 0.2–1)
WBC URNS QL MICRO: NORMAL /HPF (ref 0–4)

## 2023-08-07 PROCEDURE — 36415 COLL VENOUS BLD VENIPUNCTURE: CPT

## 2023-08-07 PROCEDURE — 81001 URINALYSIS AUTO W/SCOPE: CPT

## 2023-08-07 PROCEDURE — 80048 BASIC METABOLIC PNL TOTAL CA: CPT

## 2023-08-08 NOTE — TELEPHONE ENCOUNTER
Left name and call back number. See message below:      Has a few white blood cells in her urine, any UTI symptoms?

## 2023-08-14 ENCOUNTER — OFFICE VISIT (OUTPATIENT)
Age: 78
End: 2023-08-14
Payer: MEDICARE

## 2023-08-14 VITALS
WEIGHT: 237 LBS | TEMPERATURE: 98.2 F | HEIGHT: 66 IN | RESPIRATION RATE: 18 BRPM | BODY MASS INDEX: 38.09 KG/M2 | HEART RATE: 59 BPM | DIASTOLIC BLOOD PRESSURE: 47 MMHG | OXYGEN SATURATION: 98 % | SYSTOLIC BLOOD PRESSURE: 123 MMHG

## 2023-08-14 DIAGNOSIS — I10 ESSENTIAL HYPERTENSION: ICD-10-CM

## 2023-08-14 DIAGNOSIS — R51.9 NONINTRACTABLE HEADACHE, UNSPECIFIED CHRONICITY PATTERN, UNSPECIFIED HEADACHE TYPE: ICD-10-CM

## 2023-08-14 DIAGNOSIS — E66.01 SEVERE OBESITY (BMI 35.0-39.9) WITH COMORBIDITY (HCC): ICD-10-CM

## 2023-08-14 DIAGNOSIS — E78.00 HYPERCHOLESTEREMIA: ICD-10-CM

## 2023-08-14 DIAGNOSIS — R06.83 SNORING: ICD-10-CM

## 2023-08-14 DIAGNOSIS — Z00.00 MEDICARE ANNUAL WELLNESS VISIT, SUBSEQUENT: Primary | ICD-10-CM

## 2023-08-14 DIAGNOSIS — R53.83 OTHER FATIGUE: ICD-10-CM

## 2023-08-14 PROCEDURE — 99211 OFF/OP EST MAY X REQ PHY/QHP: CPT | Performed by: INTERNAL MEDICINE

## 2023-08-14 SDOH — ECONOMIC STABILITY: FOOD INSECURITY: WITHIN THE PAST 12 MONTHS, THE FOOD YOU BOUGHT JUST DIDN'T LAST AND YOU DIDN'T HAVE MONEY TO GET MORE.: NEVER TRUE

## 2023-08-14 SDOH — ECONOMIC STABILITY: FOOD INSECURITY: WITHIN THE PAST 12 MONTHS, YOU WORRIED THAT YOUR FOOD WOULD RUN OUT BEFORE YOU GOT MONEY TO BUY MORE.: NEVER TRUE

## 2023-08-14 SDOH — ECONOMIC STABILITY: HOUSING INSECURITY
IN THE LAST 12 MONTHS, WAS THERE A TIME WHEN YOU DID NOT HAVE A STEADY PLACE TO SLEEP OR SLEPT IN A SHELTER (INCLUDING NOW)?: NO

## 2023-08-14 SDOH — ECONOMIC STABILITY: INCOME INSECURITY: HOW HARD IS IT FOR YOU TO PAY FOR THE VERY BASICS LIKE FOOD, HOUSING, MEDICAL CARE, AND HEATING?: NOT HARD AT ALL

## 2023-08-14 ASSESSMENT — PATIENT HEALTH QUESTIONNAIRE - PHQ9
1. LITTLE INTEREST OR PLEASURE IN DOING THINGS: 0
SUM OF ALL RESPONSES TO PHQ QUESTIONS 1-9: 0
SUM OF ALL RESPONSES TO PHQ QUESTIONS 1-9: 0
SUM OF ALL RESPONSES TO PHQ9 QUESTIONS 1 & 2: 0
SUM OF ALL RESPONSES TO PHQ QUESTIONS 1-9: 0
SUM OF ALL RESPONSES TO PHQ QUESTIONS 1-9: 0
2. FEELING DOWN, DEPRESSED OR HOPELESS: 0

## 2023-08-14 ASSESSMENT — LIFESTYLE VARIABLES
HOW MANY STANDARD DRINKS CONTAINING ALCOHOL DO YOU HAVE ON A TYPICAL DAY: PATIENT DOES NOT DRINK
HOW OFTEN DO YOU HAVE A DRINK CONTAINING ALCOHOL: NEVER

## 2023-08-14 NOTE — PROGRESS NOTES
Yasmin Lundberg presents today for   Chief Complaint   Patient presents with    Medicare AWV                 1. \"Have you been to the ER, urgent care clinic since your last visit? Hospitalized since your last visit? \" no    2. \"Have you seen or consulted any other health care providers outside of the [address] [place] [address] since your last visit? \" no     3. For patients aged 43-73: Has the patient had a colonoscopy / FIT/ Cologuard? NA - based on age      If the patient is female:    4. For patients aged 43-66: Has the patient had a mammogram within the past 2 years? NA - based on age or sex      11. For patients aged 21-65: Has the patient had a pap smear?  NA - based on age or sex

## 2023-08-14 NOTE — PROGRESS NOTES
HPI     Elle Candelaria is here for a medical wellness/ACP visit. She complains of feeling fatigued despite what sounds like adequate number of hours of sleep. She has been having some headaches the past few months, sometimes on awakening. They do resolve with something like Tylenol. On questioning, she does snore, unsure if apnea, does awaken with dry mouth sometimes. Past Medical History:   Diagnosis Date    Benign essential hypertension     Colon polyps     Hypercholesteremia 2022    Ill-defined condition     sciatica    Macular degeneration     followed by Dr. Crystal Sharp involving multiple sites     Restless legs syndrome         Past Surgical History:   Procedure Laterality Date    APPENDECTOMY      during hystetrectomy    BREAST BIOPSY      benign     SECTION      HYSTERECTOMY (CERVIX STATUS UNKNOWN)      OVARY REMOVAL Right     TOTAL KNEE ARTHROPLASTY      Dr. Madalyn Nair        Current Outpatient Medications   Medication Sig Dispense Refill    Multiple Vitamins-Minerals (OCUVITE EXTRA PO) daily      amoxicillin (AMOXIL) 500 MG capsule 4 p.o. 1 hour prior to dental visit 4 capsule 2    labetalol (NORMODYNE) 100 MG tablet Take 1 tablet by mouth 2 times daily 180 tablet 0    hydroCHLOROthiazide (HYDRODIURIL) 25 MG tablet 1 po daily 90 tablet 3    atorvastatin (LIPITOR) 10 MG tablet Take 1 tablet by mouth daily 90 tablet 3    amLODIPine (NORVASC) 5 MG tablet        No current facility-administered medications for this visit.         No Known Allergies     Social History     Socioeconomic History    Marital status:      Spouse name: Not on file    Number of children: Not on file    Years of education: Not on file    Highest education level: Not on file   Occupational History    Not on file   Tobacco Use    Smoking status: Former    Smokeless tobacco: Never   Substance and Sexual Activity    Alcohol use: No    Drug use: No    Sexual activity: Not Currently     Partners:

## 2023-08-20 ASSESSMENT — ENCOUNTER SYMPTOMS
BLOOD IN STOOL: 0
SHORTNESS OF BREATH: 0

## 2023-10-04 ENCOUNTER — TELEPHONE (OUTPATIENT)
Age: 78
End: 2023-10-04

## 2023-10-04 NOTE — TELEPHONE ENCOUNTER
Refill request received via fax:   - labetalol (NORMODYNE) 100 MG tablet    Pharmacy: [address] [place] [address] on [address]

## 2023-10-05 DIAGNOSIS — I10 ESSENTIAL HYPERTENSION: ICD-10-CM

## 2023-10-05 RX ORDER — LABETALOL 100 MG/1
100 TABLET, FILM COATED ORAL 2 TIMES DAILY
Qty: 180 TABLET | Refills: 3 | Status: SHIPPED | OUTPATIENT
Start: 2023-10-05

## 2023-12-11 ENCOUNTER — OFFICE VISIT (OUTPATIENT)
Age: 78
End: 2023-12-11
Payer: MEDICARE

## 2023-12-11 VITALS
OXYGEN SATURATION: 98 % | RESPIRATION RATE: 18 BRPM | HEART RATE: 57 BPM | BODY MASS INDEX: 37.77 KG/M2 | TEMPERATURE: 97.4 F | DIASTOLIC BLOOD PRESSURE: 57 MMHG | SYSTOLIC BLOOD PRESSURE: 145 MMHG | WEIGHT: 235 LBS | HEIGHT: 66 IN

## 2023-12-11 DIAGNOSIS — I10 ESSENTIAL HYPERTENSION: ICD-10-CM

## 2023-12-11 DIAGNOSIS — R35.1 NOCTURIA: ICD-10-CM

## 2023-12-11 DIAGNOSIS — Z72.821 INADEQUATE SLEEP HYGIENE: ICD-10-CM

## 2023-12-11 DIAGNOSIS — G47.30 INSOMNIA W/ SLEEP APNEA: ICD-10-CM

## 2023-12-11 DIAGNOSIS — I50.32 CHRONIC DIASTOLIC (CONGESTIVE) HEART FAILURE (HCC): ICD-10-CM

## 2023-12-11 DIAGNOSIS — R06.83 SNORING: Primary | ICD-10-CM

## 2023-12-11 DIAGNOSIS — R29.818 SUSPECTED SLEEP APNEA: ICD-10-CM

## 2023-12-11 DIAGNOSIS — G47.00 INSOMNIA W/ SLEEP APNEA: ICD-10-CM

## 2023-12-11 DIAGNOSIS — E66.9 OBESITY (BMI 35.0-39.9 WITHOUT COMORBIDITY): ICD-10-CM

## 2023-12-11 DIAGNOSIS — E66.01 SEVERE OBESITY (HCC): ICD-10-CM

## 2023-12-11 DIAGNOSIS — G25.81 RESTLESS LEGS SYNDROME: ICD-10-CM

## 2023-12-11 DIAGNOSIS — G47.19 EXCESSIVE DAYTIME SLEEPINESS: ICD-10-CM

## 2023-12-11 PROCEDURE — G8427 DOCREV CUR MEDS BY ELIG CLIN: HCPCS | Performed by: OTOLARYNGOLOGY

## 2023-12-11 PROCEDURE — 1090F PRES/ABSN URINE INCON ASSESS: CPT | Performed by: OTOLARYNGOLOGY

## 2023-12-11 PROCEDURE — 1123F ACP DISCUSS/DSCN MKR DOCD: CPT | Performed by: OTOLARYNGOLOGY

## 2023-12-11 PROCEDURE — G8399 PT W/DXA RESULTS DOCUMENT: HCPCS | Performed by: OTOLARYNGOLOGY

## 2023-12-11 PROCEDURE — 99204 OFFICE O/P NEW MOD 45 MIN: CPT | Performed by: OTOLARYNGOLOGY

## 2023-12-11 PROCEDURE — G8484 FLU IMMUNIZE NO ADMIN: HCPCS | Performed by: OTOLARYNGOLOGY

## 2023-12-11 PROCEDURE — G8417 CALC BMI ABV UP PARAM F/U: HCPCS | Performed by: OTOLARYNGOLOGY

## 2023-12-11 PROCEDURE — 3078F DIAST BP <80 MM HG: CPT | Performed by: OTOLARYNGOLOGY

## 2023-12-11 PROCEDURE — 3077F SYST BP >= 140 MM HG: CPT | Performed by: OTOLARYNGOLOGY

## 2023-12-11 PROCEDURE — 1036F TOBACCO NON-USER: CPT | Performed by: OTOLARYNGOLOGY

## 2023-12-11 ASSESSMENT — SLEEP AND FATIGUE QUESTIONNAIRES
HOW LIKELY ARE YOU TO NOD OFF OR FALL ASLEEP WHILE WATCHING TV: 3
HOW LIKELY ARE YOU TO NOD OFF OR FALL ASLEEP WHEN YOU ARE A PASSENGER IN A CAR FOR AN HOUR WITHOUT A BREAK: 3
ESS TOTAL SCORE: 15
HOW LIKELY ARE YOU TO NOD OFF OR FALL ASLEEP WHILE SITTING AND READING: 2
HOW LIKELY ARE YOU TO NOD OFF OR FALL ASLEEP WHILE SITTING AND TALKING TO SOMEONE: 0
HOW LIKELY ARE YOU TO NOD OFF OR FALL ASLEEP WHILE LYING DOWN TO REST IN THE AFTERNOON WHEN CIRCUMSTANCES PERMIT: 1
HOW LIKELY ARE YOU TO NOD OFF OR FALL ASLEEP WHILE SITTING QUIETLY AFTER LUNCH WITHOUT ALCOHOL: 3
HOW LIKELY ARE YOU TO NOD OFF OR FALL ASLEEP IN A CAR, WHILE STOPPED FOR A FEW MINUTES IN TRAFFIC: 0
NECK CIRCUMFERENCE (INCHES): 15
HOW LIKELY ARE YOU TO NOD OFF OR FALL ASLEEP WHILE SITTING INACTIVE IN A PUBLIC PLACE: 3

## 2023-12-11 ASSESSMENT — PATIENT HEALTH QUESTIONNAIRE - PHQ9
SUM OF ALL RESPONSES TO PHQ QUESTIONS 1-9: 0
SUM OF ALL RESPONSES TO PHQ QUESTIONS 1-9: 0
2. FEELING DOWN, DEPRESSED OR HOPELESS: 0
SUM OF ALL RESPONSES TO PHQ QUESTIONS 1-9: 0
SUM OF ALL RESPONSES TO PHQ9 QUESTIONS 1 & 2: 0
SUM OF ALL RESPONSES TO PHQ QUESTIONS 1-9: 0
1. LITTLE INTEREST OR PLEASURE IN DOING THINGS: 0

## 2023-12-11 NOTE — PROGRESS NOTES
Carina West presents today for   Chief Complaint   Patient presents with    Fatigue    Sleep Problem     trouble falling asleep       Is someone accompanying this pt? Yes,friend     Is the patient using any DME equipment during OV? no    -DME Company N/A    Have you ever had a sleep study done before? no    Depression Screenin/11/2023    11:10 AM   PHQ-9    Little interest or pleasure in doing things 0   Feeling down, depressed, or hopeless 0   PHQ-2 Score 0   PHQ-9 Total Score 0        [place] Sleepiness Scale:      2023    11:16 AM   Sleep Medicine   Sitting and reading 2   Watching TV 3   Sitting, inactive in a public place (e.g. a theatre or a meeting) 3   As a passenger in a car for an hour without a break 3   Lying down to rest in the afternoon when circumstances permit 1   Sitting and talking to someone 0   Sitting quietly after a lunch without alcohol 3   In a car, while stopped for a few minutes in traffic 0   [place] Sleepiness Score 15   Neck circumference (Inches) 15       Stop-Ban/11/2023    11:00 AM   STOP-BANG QUESTIONNAIRE   Are you a loud and/or regular snorer? 0   Do you often feel tired or groggy upon awakening or do you awaken with a headache? 1   Have you been observed to gasp or stop breathing during sleep? 1   Are you often tired or fatigued during wake time hours? 0   Do you fall asleep sitting, reading, watching TV or driving? 0   Do you often have problems with memory or concentration? 0   Do you have or are you being treated for high blood pressure? 1   Recent BMI (Calculated) 38.3   Is BMI greater than 35 kg/m2? 1=Yes   Age older than 48years old? 1=Yes   Is your neck circumference greater than 17 inches (Male) or 16 inches (Female)? 1   Gender - Male 0=No   STOP-Bang Total Score 44.3         Coordination of Care:  1. Have you been to the ER, urgent care clinic since your last visit? Hospitalized since your last visit? no    2.  Have you seen or

## 2023-12-11 NOTE — ASSESSMENT & PLAN NOTE
patient is fairly sedentary and dozes off in front of the TV frequently and has the TV on while falling asleep at night.   She does turn it on in the middle of the night as well on occasion and has significant difficulty then getting back to sleep

## 2023-12-11 NOTE — ASSESSMENT & PLAN NOTE
I suspect this is directly related to moderate to severe obstructive sleep apnea and hopefully will dramatically improve once we address this

## 2023-12-11 NOTE — PROGRESS NOTES
[facility]   Sleep Medicine     Office Progress Note - Initial Evaluation        [id number] [address] [phone] Fax    Reason for visit/referral: Evaluation for possible obstructive sleep apnea/sleep disordered breathing  Assessment:      1. Snoring  -     HOME SLEEP STUDY (61083); Future  2. Suspected sleep apnea  3. Excessive daytime sleepiness  4. Inadequate sleep hygiene  Assessment & Plan:    patient is fairly sedentary and dozes off in front of the TV frequently and has the TV on while falling asleep at night. She does turn it on in the middle of the night as well on occasion and has significant difficulty then getting back to sleep  5. Nocturia  Assessment & Plan:    I suspect this is directly related to moderate to severe obstructive sleep apnea and hopefully will improve dramatically once we address this  6. Insomnia w/ sleep apnea  Assessment & Plan:    I suspect this is directly related to moderate to severe obstructive sleep apnea and hopefully will dramatically improve once we address this  7. Chronic diastolic (congestive) heart failure (HCC)  Assessment & Plan:    stable, patient said she does not require follow-up with cardiology  8. Essential hypertension  Assessment & Plan:   Asymptomatic, continue current medications, slightly elevated blood pressure today  9. Restless legs syndrome  Assessment & Plan:    the patient stated that this used to be much worse when she was younger but does bother her on occasion. After we diagnose and treat her presumed moderate to severe obstructive sleep apnea, we will revisit this as it may improve with treating that alone. We may need to get iron studies if this still is an issue. Will readdress in follow-up  10. Severe obesity ([address])  11.  Obesity (BMI 35.0-39.9 without comorbidity)     Plan:    I have discussed the nature and definition of obstructive sleep apnea and why it would be

## 2023-12-11 NOTE — ASSESSMENT & PLAN NOTE
I suspect this is directly related to moderate to severe obstructive sleep apnea and hopefully will improve dramatically once we address this

## 2023-12-11 NOTE — PATIENT INSTRUCTIONS
Please make a follow up appointment to discuss the results of your sleep study. If this is impossible for some reason, please send me a \"My Chart\" message so that I may get back with you in a timely manner. The [facility] is located in the [address] [facility], adjacent to [facility]. The lab is on the second floor. The direct number to call for sleep study related questions is: [phone]. Please call our clinic back at [phone] or send a message on [employer] if you have any questions or concerns or if you are experiencing any of the following: You have not received a follow up appointment within 30 days prior the recommended follow up time. If you are not tolerating treatment plan and/or not able to obtain equipment or prescribed medication(s). if you are experiencing any difficulties with the [address] [place] [address] [employer]  (DME) Company you may be using or is assigned to you. Two weeks have passed and you have not received an appointment for a scheduled procedure. Two weeks have passed since you underwent a test and/or procedure and you have not received your results. If you are using a CPAP/BIPAP, or Home Ventilator Device- Please note the following. Currently, many DMEs are experiencing supply chain difficulties and orders for equipment may be back logged several weeks. Your  Durable Medical Equipment (DME ) company is supposed to provide you with replacement filters, tubing and masks. You can either call your DME when you need new supplies or you can arrange for an automatic shipment schedule. Your need to be seen by our office at lat minimum of every 12 months in order to renew the prescription for these supplies. Please make note of who your DME company is and their phone number. Please make sure that you clean your mask and hosing on a regular basis.   Your DME can provide you with additional information regarding proper care and cleaning of your

## 2023-12-11 NOTE — ASSESSMENT & PLAN NOTE
the patient stated that this used to be much worse when she was younger but does bother her on occasion. After we diagnose and treat her presumed moderate to severe obstructive sleep apnea, we will revisit this as it may improve with treating that alone. We may need to get iron studies if this still is an issue.   Will readdress in follow-up

## 2023-12-20 PROBLEM — G47.33 OBSTRUCTIVE SLEEP APNEA (ADULT) (PEDIATRIC): Status: ACTIVE | Noted: 2023-12-20

## 2024-02-09 ENCOUNTER — HOSPITAL ENCOUNTER (OUTPATIENT)
Facility: HOSPITAL | Age: 79
Setting detail: SPECIMEN
End: 2024-02-09
Payer: MEDICARE

## 2024-02-09 DIAGNOSIS — I10 ESSENTIAL HYPERTENSION: ICD-10-CM

## 2024-02-09 DIAGNOSIS — E78.00 HYPERCHOLESTEREMIA: ICD-10-CM

## 2024-02-09 DIAGNOSIS — Z00.00 MEDICARE ANNUAL WELLNESS VISIT, SUBSEQUENT: ICD-10-CM

## 2024-02-09 LAB
ALBUMIN SERPL-MCNC: 4 G/DL (ref 3.4–5)
ALBUMIN/GLOB SERPL: 1.3 (ref 0.8–1.7)
ALP SERPL-CCNC: 84 U/L (ref 45–117)
ALT SERPL-CCNC: 21 U/L (ref 13–56)
AMORPH CRY URNS QL MICRO: ABNORMAL
ANION GAP SERPL CALC-SCNC: 3 MMOL/L (ref 3–18)
APPEARANCE UR: CLEAR
AST SERPL-CCNC: 9 U/L (ref 10–38)
BACTERIA URNS QL MICRO: ABNORMAL /HPF
BILIRUB SERPL-MCNC: 0.6 MG/DL (ref 0.2–1)
BILIRUB UR QL: NEGATIVE
BUN SERPL-MCNC: 19 MG/DL (ref 7–18)
BUN/CREAT SERPL: 24 (ref 12–20)
CALCIUM SERPL-MCNC: 9.8 MG/DL (ref 8.5–10.1)
CHLORIDE SERPL-SCNC: 105 MMOL/L (ref 100–111)
CHOLEST SERPL-MCNC: 135 MG/DL
CO2 SERPL-SCNC: 31 MMOL/L (ref 21–32)
COLOR UR: YELLOW
CREAT SERPL-MCNC: 0.78 MG/DL (ref 0.6–1.3)
EPITH CASTS URNS QL MICRO: ABNORMAL /LPF (ref 0–5)
ERYTHROCYTE [DISTWIDTH] IN BLOOD BY AUTOMATED COUNT: 13.3 % (ref 11.6–14.5)
GLOBULIN SER CALC-MCNC: 3.2 G/DL (ref 2–4)
GLUCOSE SERPL-MCNC: 100 MG/DL (ref 74–99)
GLUCOSE UR STRIP.AUTO-MCNC: NEGATIVE MG/DL
HCT VFR BLD AUTO: 45.4 % (ref 35–45)
HDLC SERPL-MCNC: 64 MG/DL (ref 40–60)
HDLC SERPL: 2.1 (ref 0–5)
HGB BLD-MCNC: 14.3 G/DL (ref 12–16)
HGB UR QL STRIP: NEGATIVE
KETONES UR QL STRIP.AUTO: NEGATIVE MG/DL
LDLC SERPL CALC-MCNC: 57.8 MG/DL (ref 0–100)
LEUKOCYTE ESTERASE UR QL STRIP.AUTO: ABNORMAL
LIPID PANEL: ABNORMAL
MCH RBC QN AUTO: 28.9 PG (ref 24–34)
MCHC RBC AUTO-ENTMCNC: 31.5 G/DL (ref 31–37)
MCV RBC AUTO: 91.7 FL (ref 78–100)
MUCOUS THREADS URNS QL MICRO: ABNORMAL /LPF
NITRITE UR QL STRIP.AUTO: NEGATIVE
NRBC # BLD: 0 K/UL (ref 0–0.01)
NRBC BLD-RTO: 0 PER 100 WBC
PH UR STRIP: 6.5 (ref 5–8)
PLATELET # BLD AUTO: 293 K/UL (ref 135–420)
PMV BLD AUTO: 11.2 FL (ref 9.2–11.8)
POTASSIUM SERPL-SCNC: 4.5 MMOL/L (ref 3.5–5.5)
PROT SERPL-MCNC: 7.2 G/DL (ref 6.4–8.2)
PROT UR STRIP-MCNC: NEGATIVE MG/DL
RBC # BLD AUTO: 4.95 M/UL (ref 4.2–5.3)
RBC #/AREA URNS HPF: ABNORMAL /HPF (ref 0–5)
SODIUM SERPL-SCNC: 139 MMOL/L (ref 136–145)
SP GR UR REFRACTOMETRY: 1.02 (ref 1–1.03)
TRIGL SERPL-MCNC: 66 MG/DL
UROBILINOGEN UR QL STRIP.AUTO: 0.2 EU/DL (ref 0.2–1)
VLDLC SERPL CALC-MCNC: 13.2 MG/DL
WBC # BLD AUTO: 11.2 K/UL (ref 4.6–13.2)
WBC URNS QL MICRO: ABNORMAL /HPF (ref 0–4)

## 2024-02-09 PROCEDURE — 80061 LIPID PANEL: CPT

## 2024-02-09 PROCEDURE — 80053 COMPREHEN METABOLIC PANEL: CPT

## 2024-02-09 PROCEDURE — 36415 COLL VENOUS BLD VENIPUNCTURE: CPT

## 2024-02-09 PROCEDURE — 81001 URINALYSIS AUTO W/SCOPE: CPT

## 2024-02-09 PROCEDURE — 85027 COMPLETE CBC AUTOMATED: CPT

## 2024-02-16 ENCOUNTER — OFFICE VISIT (OUTPATIENT)
Age: 79
End: 2024-02-16

## 2024-02-16 VITALS
DIASTOLIC BLOOD PRESSURE: 62 MMHG | HEART RATE: 62 BPM | OXYGEN SATURATION: 99 % | SYSTOLIC BLOOD PRESSURE: 128 MMHG | BODY MASS INDEX: 37.45 KG/M2 | TEMPERATURE: 98.4 F | WEIGHT: 233 LBS | HEIGHT: 66 IN

## 2024-02-16 DIAGNOSIS — E66.01 SEVERE OBESITY (BMI 35.0-39.9) WITH COMORBIDITY (HCC): ICD-10-CM

## 2024-02-16 DIAGNOSIS — I10 ESSENTIAL HYPERTENSION: Primary | ICD-10-CM

## 2024-02-16 DIAGNOSIS — Z01.818 PREOPERATIVE EVALUATION TO RULE OUT SURGICAL CONTRAINDICATION: ICD-10-CM

## 2024-02-17 NOTE — PROGRESS NOTES
Genet Short presents today for   Chief Complaint   Patient presents with    Follow-up                 1. \"Have you been to the ER, urgent care clinic since your last visit?  Hospitalized since your last visit?\" no    2. \"Have you seen or consulted any other health care providers outside of the [facility] since your last visit?\" no     3. For patients aged 45-75: Has the patient had a colonoscopy / FIT/ Cologuard? No      If the patient is female:    4. For patients aged 40-74: Has the patient had a mammogram within the past 2 years? No      5. For patients aged 21-65: Has the patient had a pap smear? No   
 Negative for chest pain.   Gastrointestinal:  Negative for blood in stool.   Genitourinary:  Negative for dysuria and hematuria.   Musculoskeletal:  Positive for arthralgias.   Skin:  Positive for rash.   Hematological:  Does not bruise/bleed easily.   Psychiatric/Behavioral:  Negative for dysphoric mood. The patient is not nervous/anxious.         Physical Exam  Constitutional:       General: She is not in acute distress.     Appearance: She is obese.   Eyes:      General: No scleral icterus.     Conjunctiva/sclera: Conjunctivae normal.   Neck:      Comments: Carotid upstroke normal to palpataion  Cardiovascular:      Rate and Rhythm: Normal rate and regular rhythm.      Heart sounds: Murmur heard.      No friction rub. No gallop.   Pulmonary:      Effort: Pulmonary effort is normal.      Breath sounds: Normal breath sounds.   Abdominal:      Palpations: Abdomen is soft.      Tenderness: There is no abdominal tenderness.   Musculoskeletal:      Cervical back: Neck supple.      Comments: No clubbing cyanosis, or edema, calves NT, no cords   Skin:     General: Skin is warm and dry.   Neurological:      Mental Status: She is alert and oriented to person, place, and time.   Psychiatric:         Mood and Affect: Mood normal.                  Genet was seen today for follow-up, other and rash.    Diagnoses and all orders for this visit:    Essential hypertension  Comments:  Controlled, amlodipine not needed, continue current meds    Preoperative evaluation to rule out surgical contraindication    Severe obesity (BMI 35.0-39.9) with comorbidity (HCC)  Comments:  Comorbidities hypertension, knee pain, hyperlipidemia.  Has lost some weight, urged to continue    Under \"preop eval\":    Based on today's H&P, no medical contraindication to planned procedure    No follow-up provider specified.         Lilian Butler MD

## 2024-02-20 ENCOUNTER — TELEPHONE (OUTPATIENT)
Age: 79
End: 2024-02-20

## 2024-02-28 ENCOUNTER — CLINICAL DOCUMENTATION (OUTPATIENT)
Age: 79
End: 2024-02-28

## 2024-03-29 ENCOUNTER — OFFICE VISIT (OUTPATIENT)
Age: 79
End: 2024-03-29
Payer: MEDICARE

## 2024-03-29 VITALS
WEIGHT: 231 LBS | HEART RATE: 66 BPM | HEIGHT: 66 IN | TEMPERATURE: 98.2 F | BODY MASS INDEX: 37.12 KG/M2 | OXYGEN SATURATION: 97 % | RESPIRATION RATE: 18 BRPM | DIASTOLIC BLOOD PRESSURE: 59 MMHG | SYSTOLIC BLOOD PRESSURE: 115 MMHG

## 2024-03-29 DIAGNOSIS — Z01.818 PREOPERATIVE EVALUATION TO RULE OUT SURGICAL CONTRAINDICATION: ICD-10-CM

## 2024-03-29 DIAGNOSIS — I10 ESSENTIAL HYPERTENSION: Primary | ICD-10-CM

## 2024-03-29 PROCEDURE — G8399 PT W/DXA RESULTS DOCUMENT: HCPCS | Performed by: INTERNAL MEDICINE

## 2024-03-29 PROCEDURE — 1123F ACP DISCUSS/DSCN MKR DOCD: CPT | Performed by: INTERNAL MEDICINE

## 2024-03-29 PROCEDURE — 3078F DIAST BP <80 MM HG: CPT | Performed by: INTERNAL MEDICINE

## 2024-03-29 PROCEDURE — G8484 FLU IMMUNIZE NO ADMIN: HCPCS | Performed by: INTERNAL MEDICINE

## 2024-03-29 PROCEDURE — G8417 CALC BMI ABV UP PARAM F/U: HCPCS | Performed by: INTERNAL MEDICINE

## 2024-03-29 PROCEDURE — 99214 OFFICE O/P EST MOD 30 MIN: CPT | Performed by: INTERNAL MEDICINE

## 2024-03-29 PROCEDURE — 1036F TOBACCO NON-USER: CPT | Performed by: INTERNAL MEDICINE

## 2024-03-29 PROCEDURE — G8427 DOCREV CUR MEDS BY ELIG CLIN: HCPCS | Performed by: INTERNAL MEDICINE

## 2024-03-29 PROCEDURE — 3074F SYST BP LT 130 MM HG: CPT | Performed by: INTERNAL MEDICINE

## 2024-03-29 PROCEDURE — 1090F PRES/ABSN URINE INCON ASSESS: CPT | Performed by: INTERNAL MEDICINE

## 2024-03-29 ASSESSMENT — PATIENT HEALTH QUESTIONNAIRE - PHQ9
1. LITTLE INTEREST OR PLEASURE IN DOING THINGS: NOT AT ALL
SUM OF ALL RESPONSES TO PHQ QUESTIONS 1-9: 0
2. FEELING DOWN, DEPRESSED OR HOPELESS: NOT AT ALL
SUM OF ALL RESPONSES TO PHQ9 QUESTIONS 1 & 2: 0
SUM OF ALL RESPONSES TO PHQ QUESTIONS 1-9: 0

## 2024-03-29 ASSESSMENT — ENCOUNTER SYMPTOMS: SHORTNESS OF BREATH: 0

## 2024-03-29 NOTE — PROGRESS NOTES
Genet Short presents today for pre op              1. \"Have you been to the ER, urgent care clinic since your last visit?  Hospitalized since your last visit?\" no    2. \"Have you seen or consulted any other health care providers outside of the [facility] since your last visit?\" no     3. For patients aged 45-75: Has the patient had a colonoscopy / FIT/ Cologuard? NA - based on age      If the patient is female:    4. For patients aged 40-74: Has the patient had a mammogram within the past 2 years? NA - based on age or sex      5. For patients aged 21-65: Has the patient had a pap smear? NA - based on age or sex

## 2024-03-29 NOTE — PROGRESS NOTES
Hypertension  Pertinent negatives include no chest pain or shortness of breath.      Genet Bardales is here to have her blood pressure rechecked.  She reports that her systolic blood pressure was 176 mmHg when she went to get preop testing done.  She believes it was checked manually, and thinks the cuff was the same size as the one here today.  She reports she was previously on amlodipine, believes the refills ran out and she has not taken it in 9 months.  Reviewed that her blood pressure is excellent on her current meds, and that she does not need to resume amlodipine at this time.    No headaches, no blurry vision    No symptoms of infection, does not use any potential anticoagulants.  No personal or family history of postoperative DVT, PE, or malignant hyperthermia.  Denies significant postop nausea and vomiting.  She has had a knee replacement, hysterectomy, , and appendectomy without postoperative complications except for needing 2 units of red blood cell transfusion after an oophorectomy in the 1960s.      Past Medical History:   Diagnosis Date    Benign essential hypertension     Colon polyps     Hypercholesteremia 2022    Ill-defined condition     sciatica    Macular degeneration     followed by Dr. Rand    Osteoarthrosis involving multiple sites     Restless legs syndrome         Past Surgical History:   Procedure Laterality Date    APPENDECTOMY      during hystetrectomy    BREAST BIOPSY      benign     SECTION      HYSTERECTOMY (CERVIX STATUS UNKNOWN)      OVARY REMOVAL Right     TOTAL KNEE ARTHROPLASTY      Dr. Gutierrez        Current Outpatient Medications   Medication Sig Dispense Refill    Apoaequorin (PREVAGEN) 10 MG CAPS Take by mouth      labetalol (NORMODYNE) 100 MG tablet Take 1 tablet by mouth 2 times daily 180 tablet 3    Multiple Vitamins-Minerals (OCUVITE EXTRA PO) daily      hydroCHLOROthiazide (HYDRODIURIL) 25 MG tablet 1 po daily 90 tablet 3    atorvastatin

## 2024-04-24 DIAGNOSIS — E78.00 HYPERCHOLESTEREMIA: ICD-10-CM

## 2024-04-24 RX ORDER — ATORVASTATIN CALCIUM 10 MG/1
10 TABLET, FILM COATED ORAL DAILY
Qty: 90 TABLET | Refills: 3 | Status: SHIPPED | OUTPATIENT
Start: 2024-04-24

## 2024-07-23 ENCOUNTER — TELEPHONE (OUTPATIENT)
Facility: CLINIC | Age: 79
End: 2024-07-23

## 2024-07-23 NOTE — TELEPHONE ENCOUNTER
Pt called in has an AWV scheduled for 8/16 and wants to know if labs are needed? No orders in the system.    Please advise.       Callback req

## 2024-08-14 NOTE — PROGRESS NOTES
Genet Short presents today for medicare wellness.              1. \"Have you been to the ER, urgent care clinic since your last visit?  Hospitalized since your last visit?\" no    2. \"Have you seen or consulted any other health care providers outside of the [facility] since your last visit?\" no     3. For patients aged 45-75: Has the patient had a colonoscopy / FIT/ Cologuard? NA - based on age      If the patient is female:    4. For patients aged 40-74: Has the patient had a mammogram within the past 2 years? NA - based on age or sex      5. For patients aged 21-65: Has the patient had a pap smear? Hysterectomy.

## 2024-08-16 ENCOUNTER — OFFICE VISIT (OUTPATIENT)
Facility: CLINIC | Age: 79
End: 2024-08-16

## 2024-08-16 VITALS
RESPIRATION RATE: 18 BRPM | OXYGEN SATURATION: 98 % | HEIGHT: 66 IN | HEART RATE: 57 BPM | BODY MASS INDEX: 37.45 KG/M2 | TEMPERATURE: 97.5 F | WEIGHT: 233 LBS | SYSTOLIC BLOOD PRESSURE: 135 MMHG | DIASTOLIC BLOOD PRESSURE: 50 MMHG

## 2024-08-16 DIAGNOSIS — E78.00 HYPERCHOLESTEREMIA: ICD-10-CM

## 2024-08-16 DIAGNOSIS — Z01.818 PREOPERATIVE EVALUATION TO RULE OUT SURGICAL CONTRAINDICATION: ICD-10-CM

## 2024-08-16 DIAGNOSIS — Z00.00 MEDICARE ANNUAL WELLNESS VISIT, SUBSEQUENT: Primary | ICD-10-CM

## 2024-08-16 DIAGNOSIS — I10 ESSENTIAL HYPERTENSION: ICD-10-CM

## 2024-08-16 SDOH — ECONOMIC STABILITY: FOOD INSECURITY: WITHIN THE PAST 12 MONTHS, YOU WORRIED THAT YOUR FOOD WOULD RUN OUT BEFORE YOU GOT MONEY TO BUY MORE.: NEVER TRUE

## 2024-08-16 SDOH — ECONOMIC STABILITY: FOOD INSECURITY: WITHIN THE PAST 12 MONTHS, THE FOOD YOU BOUGHT JUST DIDN'T LAST AND YOU DIDN'T HAVE MONEY TO GET MORE.: NEVER TRUE

## 2024-08-16 SDOH — ECONOMIC STABILITY: INCOME INSECURITY: HOW HARD IS IT FOR YOU TO PAY FOR THE VERY BASICS LIKE FOOD, HOUSING, MEDICAL CARE, AND HEATING?: NOT HARD AT ALL

## 2024-08-16 ASSESSMENT — LIFESTYLE VARIABLES
HOW OFTEN DO YOU HAVE A DRINK CONTAINING ALCOHOL: NEVER
HOW MANY STANDARD DRINKS CONTAINING ALCOHOL DO YOU HAVE ON A TYPICAL DAY: PATIENT DOES NOT DRINK

## 2024-08-16 ASSESSMENT — PATIENT HEALTH QUESTIONNAIRE - PHQ9
2. FEELING DOWN, DEPRESSED OR HOPELESS: NOT AT ALL
SUM OF ALL RESPONSES TO PHQ QUESTIONS 1-9: 0
SUM OF ALL RESPONSES TO PHQ9 QUESTIONS 1 & 2: 0
1. LITTLE INTEREST OR PLEASURE IN DOING THINGS: NOT AT ALL
SUM OF ALL RESPONSES TO PHQ QUESTIONS 1-9: 0

## 2024-08-16 NOTE — PATIENT INSTRUCTIONS
dairy with your meals.   Eat more fruits and vegetables. Try to have them with most meals and snacks.   Foods for healthy eating        Fruits   These can be fresh, frozen, canned, or dried.  Try to choose whole fruit rather than fruit juice.  Eat a variety of colors.        Vegetables   These can be fresh, frozen, canned, or dried.  Beans, peas, and lentils count too.        Whole grains   Choose whole-grain breads, cereals, and noodles.  Try brown rice.        Lean proteins   These can include lean meat, poultry, fish, and eggs.  You can also have tofu, beans, peas, lentils, nuts, and seeds.        Dairy   Try milk, yogurt, and cheese.  Choose low-fat or fat-free when you can.  If you need to, use lactose-free milk or fortified plant-based milk products, such as soy milk.        Water   Drink water when you're thirsty.  Limit sugar-sweetened drinks, including soda, fruit drinks, and sports drinks.  Where can you learn more?  Go to https://www.[employer].net/patientEd and enter T756 to learn more about \"Eating Healthy Foods: Care Instructions.\"  Current as of: September 20, 2023  Content Version: 14.1  © 2006-2024 [employer].   Care instructions adapted under license by [employer]. If you have questions about a medical condition or this instruction, always ask your healthcare professional. [employer] disclaims any warranty or liability for your use of this information.           Starting a Weight Loss Plan: Care Instructions  Overview     It can be a challenge to lose weight. But your doctor can help you make a weight-loss plan that meets your needs.  You don't have to make a lot of big changes at once. A better idea might be to focus on small changes and stick with them. When those changes become habit, you can add a few more changes.  Some people find it helpful to take an exercise or nutrition class. If you have questions, ask your doctor about seeing a registered dietitian or an

## 2024-08-16 NOTE — PROGRESS NOTES
HPI     Genet Short is here for Medicare wellness/ACP visit.  She is also having knee surgery sometime in the near future, she believes in September.  She is having preop testing done Tuesday to [facility], and I told her I can send in a preop clearance until I see her labs.    She denies personal or family history of postoperative DVT or PE, denies personal or family history of malignant hyperthermia.  She is not taking any potential anticoagulants, including NSAIDs, fish oil, or vitamin E.  She has no change in her baseline breathing.    Past Medical History:   Diagnosis Date    Benign essential hypertension     Colon polyps     Hypercholesteremia 2022    Ill-defined condition     sciatica    Macular degeneration     followed by Dr. Rand    Osteoarthrosis involving multiple sites     Restless legs syndrome         Past Surgical History:   Procedure Laterality Date    APPENDECTOMY      during hystetrectomy    BREAST BIOPSY      benign     SECTION      HYSTERECTOMY (CERVIX STATUS UNKNOWN)      OVARY REMOVAL Right     TOTAL KNEE ARTHROPLASTY      Dr. Gutierrez        Current Outpatient Medications   Medication Sig Dispense Refill    atorvastatin (LIPITOR) 10 MG tablet Take 1 tablet by mouth daily 90 tablet 3    hydroCHLOROthiazide (HYDRODIURIL) 25 MG tablet 1 po daily 90 tablet 3    Apoaequorin (PREVAGEN) 10 MG CAPS Take by mouth      labetalol (NORMODYNE) 100 MG tablet Take 1 tablet by mouth 2 times daily 180 tablet 3    Multiple Vitamins-Minerals (OCUVITE EXTRA PO) daily       No current facility-administered medications for this visit.        No Known Allergies     Social History     Socioeconomic History    Marital status:      Spouse name: Not on file    Number of children: Not on file    Years of education: Not on file    Highest education level: Not on file   Occupational History    Not on file   Tobacco Use    Smoking status: Former    Smokeless tobacco: Never

## 2024-10-01 ENCOUNTER — HOSPITAL ENCOUNTER (OUTPATIENT)
Facility: HOSPITAL | Age: 79
Setting detail: RECURRING SERIES
Discharge: HOME | End: 2024-10-04
Payer: MEDICARE

## 2024-10-01 PROCEDURE — 97530 THERAPEUTIC ACTIVITIES: CPT

## 2024-10-01 PROCEDURE — 97161 PT EVAL LOW COMPLEX 20 MIN: CPT

## 2024-10-01 NOTE — PROGRESS NOTES
PHYSICAL / OCCUPATIONAL THERAPY - DAILY TREATMENT NOTE    Patient Name: Genet Short    Date: 10/1/2024    : 1945  Insurance: Payor: MEDICARE / Plan: MEDICARE PART A AND B / Product Type: *No Product type* /      Patient  verified Yes     Visit #   Current / Total 1 8   Time   In / Out 122 150   Pain   In / Out 0 0   Subjective Functional Status/Changes: Left TKR   Last Onlvia went to [place] and walked too much. Right knee started bothering her. April originally scheduled but then had COVID so had to R/S   Interm used cane prior to surgery  Tape removal two weeks ago caused infection on skin. MD gave to meds and it better.  HHPT after surgery. They cleared her but PA recommended she still come  1 story, 3 steps to enter. Bathrooms have walk in shower with bars.   Steps with step-to pattern  Only needed pain meds x2 days  Currently 0/10, highest 1/10  Sleeping through the night  Currently amb without SPC in home mostly   HTN, High cholesterol, Macular degeneration, [place] x2 hearing aids, , ovarian cyst removal, hysterectomy     TREATMENT AREA =  Pain in right knee [M25.561]     OBJECTIVE         Therapeutic Procedures:    Tx Min Billable or 1:1 Min (if diff from Tx Min) Procedure, Rationale, Specifics   13  03721 Therapeutic Activity (timed):  use of dynamic activities replicating functional movements to increase ROM, strength, coordination, balance, and proprioception in order to improve patient's ability to progress to PLOF and address remaining functional goals.  (see flow sheet as applicable)     Details if applicable:  Amb with SPC, importance of continuing with HHPT HEP   13  [facility] Totals Reminder: bill using total billable min of TIMED therapeutic procedures (example: do not include dry needle or estim unattended, both untimed codes, in totals to left)  8-22 min = 1 unit; 23-37 min = 2 units; 38-52 min = 3 units; 53-67 min = 4 units; 68-82 min = 5 units   Total Total

## 2024-10-01 NOTE — PROGRESS NOTES
[facility]  [address] [place] [address] Ph:[phone] Fx: [phone]  Plan of Care / Statement of Necessity for Physical Therapy Services     Patient Name: Genet Short : 1945   Medical   Diagnosis: Pain in right knee [M25.561] Treatment Diagnosis: M25.561  RIGHT KNEE PAIN       Onset Date: 2023 Payor :  Payor: MEDICARE / Plan: MEDICARE PART A AND B / Product Type: *No Product type* /    Referral Source: Miri Avila Start of Care (SOC): 10/1/2024   Prior Hospitalization: [facility] Provider #: [id number]   Prior Level of Function: Amb interm with SPC, Left TKR    Comorbidities:  HTN, High cholesterol, Macular degeneration, [place] x2 hearing aids, , ovarian cyst removal, hysterectomy      Assessment / key information:  Pt is an 79 year old female who presents to PT amb with SPC, S/P right TKR on 24. Pt reports her right knee pain began in Dec 2023 after walking too much. She was scheduled for TKR last April, however due to (+) COVID was R/S. Following TKR she completed HHPT and is doing very well with minimal concerns. She did experience irritation and subsequent mild infection from placed steri-strips which has now begun to improve since MD medication intervention. Pt currently presents with minimal report of discomfort, however would benefit from skilled PT to address decreased strength and ROM as well as impaired gait and balance.   Objective Information/Functional Measures/Assessment  Amb: with SPC. Noted increased shuffling during amb without SPC  Palpation: no TTP  Observation: mild rash from tape irritation   MMT:  Hip Flex: Right 4-/5, Left 4+/5  Knee Flex: Right 4/5, Left 4+/5  Knee Ext: Right 4/5, Left 4+/5  DF: Right 4/5, Left 5/5  Hip Abd: Right 3-/5, Left NT     ROM:  Right knee: 6-108deg  Left Knee: 3-113deg     Rhom EO and EC: 30sec  SLS Right 3 sec with lateral lean, Left 4sec   Stair

## 2024-10-03 ENCOUNTER — HOSPITAL ENCOUNTER (OUTPATIENT)
Facility: HOSPITAL | Age: 79
Setting detail: RECURRING SERIES
Discharge: HOME | End: 2024-10-06
Payer: MEDICARE

## 2024-10-03 PROCEDURE — 97110 THERAPEUTIC EXERCISES: CPT

## 2024-10-03 PROCEDURE — 97112 NEUROMUSCULAR REEDUCATION: CPT

## 2024-10-03 PROCEDURE — 97530 THERAPEUTIC ACTIVITIES: CPT

## 2024-10-03 NOTE — PROGRESS NOTES
PHYSICAL / OCCUPATIONAL THERAPY - DAILY TREATMENT NOTE    Patient Name: Genet Short    Date: 10/3/2024    : 1945  Insurance: Payor: MEDICARE / Plan: MEDICARE PART A AND B / Product Type: *No Product type* /      Patient  verified Yes     Visit #   Current / Total 2 8   Time   In / Out 1238 116   Pain   In / Out 0 0   Subjective Functional Status/Changes: Feeling good, no pain.  Hard time sleeping last night.   Feeling a lot better on the way out.     TREATMENT AREA =  Pain in right knee [M25.561]     OBJECTIVE      Therapeutic Procedures:    Tx Min Billable or 1:1 Min (if diff from Tx Min) Procedure, Rationale, Specifics   14  46618 Therapeutic Exercise (timed):  increase ROM, strength, coordination, balance, and proprioception to improve patient's ability to progress to PLOF and address remaining functional goals. (see flow sheet as applicable)     Details if applicable:       12  23627 Neuromuscular Re-Education (timed):  improve balance, coordination, kinesthetic sense, posture, core stability and proprioception to improve patient's ability to develop conscious control of individual muscles and awareness of position of extremities in order to progress to PLOF and address remaining functional goals. (see flow sheet as applicable)     Details if applicable:     12  26325 Therapeutic Activity (timed):  use of dynamic activities replicating functional movements to increase ROM, strength, coordination, balance, and proprioception in order to improve patient's ability to progress to PLOF and address remaining functional goals.  (see flow sheet as applicable)     Details if applicable:            Details if applicable:     38  [facility] Totals Reminder: bill using total billable min of TIMED therapeutic procedures (example: do not include dry needle or estim unattended, both untimed codes, in totals to left)  8-22 min = 1 unit; 23-37 min = 2 units; 38-52 min = 3 units; 53-67 min = 4 units; 68-82 min =

## 2024-10-07 ENCOUNTER — HOSPITAL ENCOUNTER (OUTPATIENT)
Facility: HOSPITAL | Age: 79
Setting detail: RECURRING SERIES
Discharge: HOME | End: 2024-10-10
Payer: MEDICARE

## 2024-10-07 PROCEDURE — 97110 THERAPEUTIC EXERCISES: CPT

## 2024-10-07 PROCEDURE — 97530 THERAPEUTIC ACTIVITIES: CPT

## 2024-10-07 PROCEDURE — 97112 NEUROMUSCULAR REEDUCATION: CPT

## 2024-10-07 NOTE — PROGRESS NOTES
PHYSICAL / OCCUPATIONAL THERAPY - DAILY TREATMENT NOTE    Patient Name: Genet Short    Date: 10/7/2024    : 1945  Insurance: Payor: MEDICARE / Plan: MEDICARE PART A AND B / Product Type: *No Product type* /      Patient  verified Yes     Visit #   Current / Total 3 8   Time   In / Out 1202 1246   Pain   In / Out 1 1   Subjective Functional Status/Changes: When I first came in, my leg was really tight and bothersome, but after you were done - it felt a lot better, after initial f/u.       TREATMENT AREA =  Pain in right knee [M25.561]     OBJECTIVE      Therapeutic Procedures:    Tx Min Billable or 1:1 Min (if diff from Tx Min) Procedure, Rationale, Specifics   14  42943 Therapeutic Exercise (timed):  increase ROM, strength, coordination, balance, and proprioception to improve patient's ability to progress to PLOF and address remaining functional goals. (see flow sheet as applicable)     Details if applicable:       15  13670 Neuromuscular Re-Education (timed):  improve balance, coordination, kinesthetic sense, posture, core stability and proprioception to improve patient's ability to develop conscious control of individual muscles and awareness of position of extremities in order to progress to PLOF and address remaining functional goals. (see flow sheet as applicable)     Details if applicable:     15  90674 Therapeutic Activity (timed):  use of dynamic activities replicating functional movements to increase ROM, strength, coordination, balance, and proprioception in order to improve patient's ability to progress to PLOF and address remaining functional goals.  (see flow sheet as applicable)     Details if applicable:            Details if applicable:     44  [facility] Totals Reminder: bill using total billable min of TIMED therapeutic procedures (example: do not include dry needle or estim unattended, both untimed codes, in totals to left)  8-22 min = 1 unit; 23-37 min = 2 units; 38-52 min = 3

## 2024-10-08 DIAGNOSIS — I10 ESSENTIAL HYPERTENSION: ICD-10-CM

## 2024-10-08 RX ORDER — LABETALOL 100 MG/1
100 TABLET, FILM COATED ORAL 2 TIMES DAILY
Qty: 180 TABLET | Refills: 2 | Status: SHIPPED | OUTPATIENT
Start: 2024-10-08

## 2024-10-10 ENCOUNTER — APPOINTMENT (OUTPATIENT)
Facility: HOSPITAL | Age: 79
End: 2024-10-10
Payer: MEDICARE

## 2024-10-14 ENCOUNTER — APPOINTMENT (OUTPATIENT)
Facility: HOSPITAL | Age: 79
End: 2024-10-14
Payer: MEDICARE

## 2024-10-16 ENCOUNTER — APPOINTMENT (OUTPATIENT)
Facility: HOSPITAL | Age: 79
End: 2024-10-16
Payer: MEDICARE

## 2024-10-21 ENCOUNTER — APPOINTMENT (OUTPATIENT)
Facility: HOSPITAL | Age: 79
End: 2024-10-21
Payer: MEDICARE

## 2024-10-23 ENCOUNTER — APPOINTMENT (OUTPATIENT)
Facility: HOSPITAL | Age: 79
End: 2024-10-23
Payer: MEDICARE

## 2024-10-28 ENCOUNTER — APPOINTMENT (OUTPATIENT)
Facility: HOSPITAL | Age: 79
End: 2024-10-28
Payer: MEDICARE

## 2024-10-30 ENCOUNTER — APPOINTMENT (OUTPATIENT)
Facility: HOSPITAL | Age: 79
End: 2024-10-30
Payer: MEDICARE

## 2025-01-03 ENCOUNTER — TELEPHONE (OUTPATIENT)
Facility: CLINIC | Age: 80
End: 2025-01-03

## 2025-01-03 NOTE — TELEPHONE ENCOUNTER
Patient was notified of Dr. Butler's [facility]. Patient aware that future appointments with Dr. Butler have been cancelled and [facility] will provide patient the needed refills on medication for 90 days.   Patient was offered the phone numbers of offices in [facility] taking new patients, [facility] [phone] and [facility] [phone].  All questions were answered and patient voiced understanding.

## 2025-02-18 ENCOUNTER — TELEPHONE (OUTPATIENT)
Facility: CLINIC | Age: 80
End: 2025-02-18

## 2025-02-18 DIAGNOSIS — I10 ESSENTIAL HYPERTENSION: ICD-10-CM

## 2025-02-18 DIAGNOSIS — E78.00 HYPERCHOLESTEREMIA: ICD-10-CM

## 2025-02-18 RX ORDER — ATORVASTATIN CALCIUM 10 MG/1
10 TABLET, FILM COATED ORAL DAILY
Qty: 100 TABLET | Refills: 0 | Status: SHIPPED | OUTPATIENT
Start: 2025-02-18

## 2025-02-18 RX ORDER — LABETALOL 100 MG/1
100 TABLET, FILM COATED ORAL 2 TIMES DAILY
Qty: 200 TABLET | Refills: 0 | Status: SHIPPED | OUTPATIENT
Start: 2025-02-18

## 2025-02-18 RX ORDER — HYDROCHLOROTHIAZIDE 25 MG/1
TABLET ORAL
Qty: 100 TABLET | Refills: 0 | Status: SHIPPED | OUTPATIENT
Start: 2025-02-18

## 2025-02-18 NOTE — TELEPHONE ENCOUNTER
BP Readings from Last 3 Encounters:   08/16/24 (!) 135/50   03/29/24 (!) 115/59   02/16/24 128/62     Pulse Readings from Last 3 Encounters:   08/16/24 57   03/29/24 66   02/16/24 62       Lab Results   Component Value Date/Time     02/09/2024 10:08 AM    K 4.5 02/09/2024 10:08 AM     02/09/2024 10:08 AM    CO2 31 02/09/2024 10:08 AM    BUN 19 02/09/2024 10:08 AM    CREATININE 0.78 02/09/2024 10:08 AM    GLUCOSE 100 02/09/2024 10:08 AM    CALCIUM 9.8 02/09/2024 10:08 AM    LABGLOM >60 02/09/2024 10:08 AM      Lab Results   Component Value Date    CHOL 135 02/09/2024    TRIG 66 02/09/2024    HDL 64 (H) 02/09/2024    LDL 57.8 02/09/2024    VLDL 13.2 02/09/2024    CHOLHDLRATIO 2.1 02/09/2024     Lab Results   Component Value Date    ALT 21 02/09/2024    AST 9 (L) 02/09/2024    ALKPHOS 84 02/09/2024    BILITOT 0.6 02/09/2024

## 2025-02-18 NOTE — TELEPHONE ENCOUNTER
Refill   Hydrochlorothiazide 25 mg   Atorvastatin 10 mg   Lobetalol 100 mg   [facility] [address]         Pt has an appt at   [facility]   She can not get in sooner than 05/20  She has been put on a wait list

## 2025-06-02 ENCOUNTER — TRANSCRIBE ORDERS (OUTPATIENT)
Facility: HOSPITAL | Age: 80
End: 2025-06-02

## 2025-06-02 DIAGNOSIS — Z12.31 BREAST CANCER SCREENING BY MAMMOGRAM: Primary | ICD-10-CM

## 2025-06-24 DIAGNOSIS — I10 ESSENTIAL HYPERTENSION: ICD-10-CM

## 2025-06-24 RX ORDER — HYDROCHLOROTHIAZIDE 25 MG/1
25 TABLET ORAL DAILY
Qty: 100 TABLET | Refills: 0 | OUTPATIENT
Start: 2025-06-24

## 2025-08-05 ENCOUNTER — HOSPITAL ENCOUNTER (OUTPATIENT)
Facility: HOSPITAL | Age: 80
Discharge: HOME | End: 2025-08-08
Payer: MEDICARE

## 2025-08-05 VITALS — BODY MASS INDEX: 35.36 KG/M2 | HEIGHT: 66 IN | WEIGHT: 220 LBS

## 2025-08-05 DIAGNOSIS — Z12.31 BREAST CANCER SCREENING BY MAMMOGRAM: ICD-10-CM

## 2025-08-05 PROCEDURE — 77063 BREAST TOMOSYNTHESIS BI: CPT
